# Patient Record
Sex: FEMALE | Race: WHITE | NOT HISPANIC OR LATINO | Employment: STUDENT | URBAN - METROPOLITAN AREA
[De-identification: names, ages, dates, MRNs, and addresses within clinical notes are randomized per-mention and may not be internally consistent; named-entity substitution may affect disease eponyms.]

---

## 2017-01-10 ENCOUNTER — OFFICE VISIT (OUTPATIENT)
Dept: PODIATRY | Facility: CLINIC | Age: 22
End: 2017-01-10
Payer: COMMERCIAL

## 2017-01-10 DIAGNOSIS — B07.0 PLANTAR WART: Primary | ICD-10-CM

## 2017-01-10 PROCEDURE — 99024 POSTOP FOLLOW-UP VISIT: CPT | Mod: S$GLB,,,

## 2017-01-10 PROCEDURE — 99999 PR PBB SHADOW E&M-EST. PATIENT-LVL II: CPT | Mod: PBBFAC,,,

## 2017-01-10 NOTE — MR AVS SNAPSHOT
[place] [facility]  [address]  [facility] [address]  Phone: [phone]                  Terra Hamilton   1/10/2017 10:45 AM   Office Visit    Description:  Female : 1995   Provider:  Lm Obrien DPM   Department:  [place] [facility]           Reason for Visit     Follow-up                To Do List           Goals (5 Years of Data)     None      [facility]     [facility] -  Assistance  Registered nurses in the [facility] provide clinical advisement, health education, appointment booking, and other advisory services.  Call for this free service at [phone].             Medications           Message regarding Medications     Verify the changes and/or additions to your medication regime listed below are the same as discussed with your clinician today.  If any of these changes or additions are incorrect, please notify your healthcare provider.        STOP taking these medications     ethosuximide (ZARONTIN) 250 mg/5 mL Soln One teaspoon twice daily    meloxicam (MOBIC) 15 MG tablet Take 1 tablet (15 mg total) by mouth once daily.    minocycline (MINOCIN,DYNACIN) 100 MG capsule Po twice daily           Verify that the below list of medications is an accurate representation of the medications you are currently taking.  If none reported, the list may be blank. If incorrect, please contact your healthcare provider. Carry this list with you in case of emergency.           Current Medications     CLINDAGEL 1 % gel Thin film to scalp bid    clobetasol (CLOBEX) 0.05 % shampoo Wash scalp and soak 15 min prior to rinsing, repeat 3x/week    clobetasol (TEMOVATE) 0.05 % external solution Use on scalp one - two times daily as needed    ethosuximide (ZARONTIN) 250 mg Cap     lamotrigine (LAMICTAL) 200 MG tablet One twice daily    tazarotene (TAZORAC) 0.05 % Crea cream Thin film to face qhs, take night off if irritation develops            Clinical Reference Information           Allergies as of 1/10/2017     No Known Allergies      Immunizations Administered on Date of Encounter - 1/10/2017     None

## 2017-01-14 NOTE — PROGRESS NOTES
Chief Complaint   Patient presents with    Follow-up     10 day f/u plantar wart left foot       History of present illness: Patient returns following cryoablation distal left fifth toe wart.    Review of Systems:  Vascular : negative rest pain, claudication, bruising  Musculoskeletal: negative for joint pain   Skin: Positive for wart  Neurological: negative for burning, tingling and numbness  All other negative.    Past medical history, family history, social history  and  allergies reviewed.    Constitutional:  Patient is oriented to person, place, and time. Vital signs are normal.  Appears well-developed and well-nourished.      Vascular:  Dorsalis pedis pulses are 2+ on the right side, and 2+ on the left side.   Posterior tibial pulses are 2+ on the right side, and 2+ on the left side.   + digital hair growth, capillary fill time to all toes <3 seconds  +swelling and redness  Left 5th toenail    Skin/Dermatological:  Skin is warm.  No cyanosis or clubbing. No rashes noted.  No open wounds.   Verruca left distal 5th toe  resolved.    Musculoskeletal:    Normal range of motion of bilateral metatarsal, subtalar and ankle joints, no deformity, tenderness or crepitus. No assymmetries noted. Muscle strength to tibialis anterior, extensor hallucis longus, extensor digitorum longus, peroneal muscles, flexor hallucis/digotorum longus, posterior tibial and gastrosoleal complex is 5/5.    Neurological:  No deficits to sharp/dull, light touch or vibratory sensation.   Normal strength lower extremity muscles, normal tone without assymmetry     Asseement/Plan:  #1Verruca left fifth toenail: Resolved, rtc prn

## 2017-04-04 ENCOUNTER — HOSPITAL ENCOUNTER (OUTPATIENT)
Dept: RADIOLOGY | Facility: HOSPITAL | Age: 22
Discharge: HOME | End: 2017-04-04
Payer: COMMERCIAL

## 2017-04-04 ENCOUNTER — OFFICE VISIT (OUTPATIENT)
Dept: PODIATRY | Facility: CLINIC | Age: 22
End: 2017-04-04
Payer: COMMERCIAL

## 2017-04-04 DIAGNOSIS — Q66.6 PES VALGUS: ICD-10-CM

## 2017-04-04 DIAGNOSIS — M20.10 HALLUX ABDUCTO VALGUS, UNSPECIFIED LATERALITY: ICD-10-CM

## 2017-04-04 DIAGNOSIS — B07.0 PLANTAR WART: Primary | ICD-10-CM

## 2017-04-04 PROCEDURE — 99213 OFFICE O/P EST LOW 20 MIN: CPT | Mod: 25,S$GLB,,

## 2017-04-04 PROCEDURE — 73650 X-RAY EXAM OF HEEL: CPT | Mod: 26,50,, | Performed by: RADIOLOGY

## 2017-04-04 PROCEDURE — 17110 DESTRUCTION B9 LES UP TO 14: CPT | Mod: S$GLB,,,

## 2017-04-04 PROCEDURE — 99999 PR PBB SHADOW E&M-EST. PATIENT-LVL III: CPT | Mod: PBBFAC,,,

## 2017-04-04 PROCEDURE — 1160F RVW MEDS BY RX/DR IN RCRD: CPT | Mod: S$GLB,,,

## 2017-04-04 PROCEDURE — 73650 X-RAY EXAM OF HEEL: CPT | Mod: 50,TC,PO

## 2017-04-04 PROCEDURE — 73630 X-RAY EXAM OF FOOT: CPT | Mod: 50,TC,PO

## 2017-04-04 PROCEDURE — 73630 X-RAY EXAM OF FOOT: CPT | Mod: 26,50,, | Performed by: RADIOLOGY

## 2017-04-04 NOTE — PROGRESS NOTES
Subjective:       Patient ID: Terra Hamilton is a 21 y.o. female.    Chief Complaint: Plantar Warts    HPI  Patient presents to the clinic c/o lesions left third and fifth toes, previous left distal fifth toe wart.  She also relates arch pain, flat feet.  She has tried some OTC arch supports with mild relief.    Review of Systems  ROS:  Constitution: Negative for chills, fever, weakness and malaise/fatigue.   HEENT: Negative for headaches.   Cardiovascular: Negative for chest pain and claudication.   Respiratory: Negative for cough and shortness of breath.   Musculoskeletal: Positive for foot pain.  Negative for muscle cramps and muscle weakness.   Gastrointestinal: Negative for nausea and vomiting.   Neurological: Negative for numbness and paresthesias.   Dermatological: Negative for wound.        Objective:      Physical Exam  Constitutional:  Patient is oriented to person, place, and time. Vital signs are normal.  Appears well-developed and well-nourished.     Vascular:  Dorsalis pedis pulses are 2+ on the right side, and 2+ on the left side.   Posterior tibial pulses are 2+ on the right side, and 2+ on the left side.   + digital hair growth, capillary fill time to all toes <3 seconds, no swelling    Skin/Dermatological:  Skin is warm and intact.  No cyanosis or clubbing.  No rashes noted.  No open wounds.  Verrucae left distal 5th toe and lateral mid 3rd toe.    Musculoskeletal:      B/L HAV and collapsing flexible arches, tenderness b/l arches, + too many toe sign       Neurological:  No deficits to sharp/dull, light touch or vibratory sensation.   Muscle strength to tibialis anterior, extensor hallucis longus, extensor digitorum longus, peroneal muscles, flexor hallucis/digotorum longus, posterior tibial and gastrosoleal complex is 5/5, normal tone without assymmetry   Patellar reflexes are 2+ on the right side and 2+ on the left side.  Achilles reflexes are 2+ on the right side and 2+ on the left  side.    X-ray weightbearing bilateral feet dated today:pending      Assessment:       1. Plantar wart    2. Pes valgus    3. Hallux abducto valgus, unspecified laterality        Plan:       Plantar wart    Pes valgus  -     X-Ray Foot Complete Bilateral; Future; Expected date: 4/4/17  -     X-Ray Calcaneus Bilateral; Future; Expected date: 4/4/17    Hallux abducto valgus, unspecified laterality  -     X-Ray Foot Complete Bilateral; Future; Expected date: 4/4/17  -     X-Ray Calcaneus Bilateral; Future; Expected date: 4/4/17        Cryoablation and DSD x 2 warts.    Custom orthotics, consider arthroeresis flat foot surgery if no relief.  Vionic sandals/shoes, spenco arch supports and new balance tennis shoes.    RTC 10 days.  Will review x-ray at that time/

## 2017-04-04 NOTE — MR AVS SNAPSHOT
[facility]  [address]  [place] [address]  Phone: [phone]                  Terra Hamilton   2017 2:15 PM   Office Visit    Description:  Female : 1995   Provider:  Lm Obrien DPM   Department:  [facility]           Reason for Visit     Plantar Warts           Diagnoses this Visit        Comments    Plantar wart    -  Primary     Pes valgus         Hallux abducto valgus, unspecified laterality                To Do List           Future Appointments        Provider Department Dept Phone    2017 10:15 AM Michaela Caldwell MD [facility] [phone]    2017 11:00 AM Lm Obrien DPM [facility] [phone]      Goals (5 Years of Data)     None      [facility]     [facility] Call Nurse Care Line -  Assistance  Unless otherwise directed by your provider, please contact [facility], our nurse care line that is available for  assistance.     Registered nurses in the [facility] provide: appointment scheduling, clinical advisement, health education, and other advisory services.  Call: [phone] (toll free)               Medications           Message regarding Medications     Verify the changes and/or additions to your medication regime listed below are the same as discussed with your clinician today.  If any of these changes or additions are incorrect, please notify your healthcare provider.             Verify that the below list of medications is an accurate representation of the medications you are currently taking.  If none reported, the list may be blank. If incorrect, please contact your healthcare provider. Carry this list with you in case of emergency.           Current Medications     CLINDAGEL 1 % gel Thin film to scalp bid    clobetasol (CLOBEX) 0.05 % shampoo Wash scalp and soak 15 min prior to rinsing, repeat 3x/week    clobetasol (TEMOVATE) 0.05 % external solution Use  on scalp one - two times daily as needed    ethosuximide (ZARONTIN) 250 mg Cap     lamotrigine (LAMICTAL) 200 MG tablet One twice daily    tazarotene (TAZORAC) 0.05 % Crea cream Thin film to face qhs, take night off if irritation develops           Clinical Reference Information           Allergies as of 4/4/2017     No Known Allergies      Immunizations Administered on Date of Encounter - 4/4/2017     None      Orders Placed During Today's Visit     Future Labs/Procedures Expected by Expires    X-Ray Calcaneus Bilateral  4/4/2017 4/4/2018    X-Ray Foot Complete Bilateral  4/4/2017 4/4/2018      Language Assistance Services     ATTENTION: Language assistance services are available, free of charge. Please call [phone].      ATENCIÓN: Si ervinla nir, tiene a valdez disposición servicios gratuitos de asistencia lingüística. Llame al [phone].     CHÚ Ý: N?u b?n nói Ti?ng Vi?t, có các d?ch v? h? tr? ngôn ng? mi?n phí dành cho b?n. G?i s? [phone].         [place] [facility] complies with applicable Federal civil rights laws and does not discriminate on the basis of race, color, national origin, age, disability, or sex.

## 2017-04-10 ENCOUNTER — TELEPHONE (OUTPATIENT)
Dept: DERMATOLOGY | Facility: CLINIC | Age: 22
End: 2017-04-10

## 2017-04-10 RX ORDER — ONDANSETRON 4 MG/1
TABLET, ORALLY DISINTEGRATING ORAL
COMMUNITY
Start: 2017-02-16 | End: 2017-04-20 | Stop reason: ALTCHOICE

## 2017-04-10 NOTE — TELEPHONE ENCOUNTER
----- Message from Nery Dior sent at 4/10/2017 11:17 AM CDT -----  Contact: Mom [phone]  Please call her about rescheduling an appt for Terra.  She is only home this week and this appt needs to coordinate with Dr Hickey's appt.  Thank you!   Pt contacted & rescheduled.

## 2017-04-11 ENCOUNTER — OFFICE VISIT (OUTPATIENT)
Dept: PODIATRY | Facility: CLINIC | Age: 22
End: 2017-04-11
Payer: COMMERCIAL

## 2017-04-11 DIAGNOSIS — M20.10 HALLUX ABDUCTO VALGUS, UNSPECIFIED LATERALITY: ICD-10-CM

## 2017-04-11 DIAGNOSIS — Q66.6 PES VALGUS: ICD-10-CM

## 2017-04-11 DIAGNOSIS — B07.0 PLANTAR WART: Primary | ICD-10-CM

## 2017-04-11 PROCEDURE — 99024 POSTOP FOLLOW-UP VISIT: CPT | Mod: S$GLB,,,

## 2017-04-11 PROCEDURE — 99999 PR PBB SHADOW E&M-EST. PATIENT-LVL II: CPT | Mod: PBBFAC,,,

## 2017-04-11 PROCEDURE — 1160F RVW MEDS BY RX/DR IN RCRD: CPT | Mod: S$GLB,,,

## 2017-04-11 PROCEDURE — 17110 DESTRUCTION B9 LES UP TO 14: CPT | Mod: 58,S$GLB,,

## 2017-04-11 NOTE — PROGRESS NOTES
Subjective:       Patient ID: Terra Hamilton is a 21 y.o. female.    Chief Complaint: Follow-up (10 day f/u wart Lt foot )    HPI  Patient presents to the clinic c/o lesions left third and fifth toes, previous left distal fifth toe wart.  She also relates arch pain, flat feet.  She has tried some OTC arch supports with mild relief.    Review of Systems  ROS:  Constitution: Negative for chills, fever, weakness and malaise/fatigue.   HEENT: Negative for headaches.   Cardiovascular: Negative for chest pain and claudication.   Respiratory: Negative for cough and shortness of breath.   Musculoskeletal: Positive for foot pain.  Negative for muscle cramps and muscle weakness.   Gastrointestinal: Negative for nausea and vomiting.   Neurological: Negative for numbness and paresthesias.   Dermatological: Negative for wound.        Objective:      Physical Exam  Constitutional:  Patient is oriented to person, place, and time. Vital signs are normal.  Appears well-developed and well-nourished.     Vascular:  Dorsalis pedis pulses are 2+ on the right side, and 2+ on the left side.   Posterior tibial pulses are 2+ on the right side, and 2+ on the left side.   + digital hair growth, capillary fill time to all toes <3 seconds, no swelling    Skin/Dermatological:  Skin is warm and intact.  No cyanosis or clubbing.  No rashes noted.  No open wounds.  Verrucae left distal 5th toe and lateral mid 3rd toe 50 % resolved    Musculoskeletal:      B/L HAV and collapsing flexible arches, tenderness b/l arches, + too many toe sign       Neurological:  No deficits to sharp/dull, light touch or vibratory sensation.   Muscle strength to tibialis anterior, extensor hallucis longus, extensor digitorum longus, peroneal muscles, flexor hallucis/digotorum longus, posterior tibial and gastrosoleal complex is 5/5, normal tone without assymmetry   Patellar reflexes are 2+ on the right side and 2+ on the left side.  Achilles reflexes are 2+ on  the right side and 2+ on the left side.        Assessment:       1. Plantar wart    2. Pes valgus    3. Hallux abducto valgus, unspecified laterality        Plan:       Plantar wart    Pes valgus    Hallux abducto valgus, unspecified laterality        Cryoablation of the two warts with liquid nitrogen and DSD x 2     Custom orthotics to be received today consider arthroeresis flat foot surgery and bunionectomy if no relief.  Vionic sandals/shoes, spenco arch supports and new balance tennis shoes.  Trial of six weeks of orthotics.  The risks, benefits, complications, treatment options, and expected outcomes were discussed with the patient who verbalized understanding.   Consider sx in May if needed.    RTC 10 days.

## 2017-04-20 ENCOUNTER — OFFICE VISIT (OUTPATIENT)
Dept: DERMATOLOGY | Facility: CLINIC | Age: 22
End: 2017-04-20
Payer: COMMERCIAL

## 2017-04-20 ENCOUNTER — TELEPHONE (OUTPATIENT)
Dept: DERMATOLOGY | Facility: CLINIC | Age: 22
End: 2017-04-20

## 2017-04-20 ENCOUNTER — LAB VISIT (OUTPATIENT)
Dept: LAB | Facility: HOSPITAL | Age: 22
End: 2017-04-20
Attending: DERMATOLOGY
Payer: COMMERCIAL

## 2017-04-20 VITALS — WEIGHT: 218 LBS | BODY MASS INDEX: 41.16 KG/M2 | HEIGHT: 61 IN

## 2017-04-20 DIAGNOSIS — L73.0 ACNE KELOIDALIS NUCHAE: ICD-10-CM

## 2017-04-20 DIAGNOSIS — L73.9 FOLLICULITIS: ICD-10-CM

## 2017-04-20 DIAGNOSIS — L70.0 ACNE VULGARIS: Primary | ICD-10-CM

## 2017-04-20 DIAGNOSIS — Z79.899 ENCOUNTER FOR LONG-TERM (CURRENT) USE OF HIGH-RISK MEDICATION: ICD-10-CM

## 2017-04-20 DIAGNOSIS — B07.9 VIRAL WARTS, UNSPECIFIED TYPE: ICD-10-CM

## 2017-04-20 LAB — B-HCG UR QL: NEGATIVE

## 2017-04-20 PROCEDURE — 81025 URINE PREGNANCY TEST: CPT | Mod: PO

## 2017-04-20 PROCEDURE — 11900 INJECT SKIN LESIONS </W 7: CPT | Mod: S$GLB,,, | Performed by: DERMATOLOGY

## 2017-04-20 PROCEDURE — 99214 OFFICE O/P EST MOD 30 MIN: CPT | Mod: 25,S$GLB,, | Performed by: DERMATOLOGY

## 2017-04-20 PROCEDURE — 1160F RVW MEDS BY RX/DR IN RCRD: CPT | Mod: S$GLB,,, | Performed by: DERMATOLOGY

## 2017-04-20 PROCEDURE — 99999 PR PBB SHADOW E&M-EST. PATIENT-LVL II: CPT | Mod: PBBFAC,,, | Performed by: DERMATOLOGY

## 2017-04-20 RX ORDER — NORGESTIMATE AND ETHINYL ESTRADIOL 7DAYSX3 LO
1 KIT ORAL DAILY
Qty: 30 TABLET | Refills: 5 | Status: SHIPPED | OUTPATIENT
Start: 2017-04-20 | End: 2017-12-14

## 2017-04-20 RX ORDER — IMIQUIMOD 12.5 MG/.25G
CREAM TOPICAL
Qty: 24 PACKET | Refills: 1 | Status: SHIPPED | OUTPATIENT
Start: 2017-04-20 | End: 2017-05-22 | Stop reason: ALTCHOICE

## 2017-04-20 NOTE — PROGRESS NOTES
Subjective:       Patient ID:  Terra Hamilton is a 21 y.o. female who presents for No chief complaint on file.    HPI Comments: Last seen December 19, 2016- here for f/u, numerous complaints    Warts- left foot and arm. Has been treated by podiatry. Verruca on left 5th toe treatment resistant. Desires candida injection if possible. History donut wart s/p cryo in past. Using compounded wart cream to left arm. Warts have gotten crusty, doesn't seem to work well.     Acne vulgaris- face and mild upper back. Stable. Flares with menses. Using tazorac and onexton. No improvement. Sister tolerated isotretinoin. Considering.     Folliculitis AKN posterior scalp, improved s/p ILK       No medicated face wash.     Review of Systems   Skin: Positive for daily sunscreen use and activity-related sunscreen use. Negative for itching, rash, dry skin and wears hat.   Psychiatric/Behavioral: Negative for depressed mood, anxiety and high stress.        Objective:    Physical Exam   Constitutional: She appears well-developed and well-nourished. She is obese.  No distress.   HENT:   Mouth/Throat: Lips normal.    Eyes: Lids are normal.  No conjunctival no injection.   Cardiovascular: There is no local extremity swelling and no dependent edema.     Neurological: She is alert and oriented to person, place, and time. She is not disoriented.   Psychiatric: She has a normal mood and affect.   Skin:   Areas Examined (abnormalities noted in diagram):   Scalp / Hair Palpated and Inspected  Head / Face Inspection Performed  Neck Inspection Performed  Chest / Axilla Inspection Performed  Abdomen Inspection Performed  Back Inspection Performed  RUE Inspected  LUE Inspection Performed  RLE Inspected  LLE Inspection Performed  Nails and Digits Inspection Performed                       Diagram Legend     Erythematous scaling macule/papule c/w actinic keratosis       Vascular papule c/w angioma      Pigmented verrucoid papule/plaque c/w  seborrheic keratosis      Yellow umbilicated papule c/w sebaceous hyperplasia      Irregularly shaped tan macule c/w lentigo     1-2 mm smooth white papules consistent with Milia      Movable subcutaneous cyst with punctum c/w epidermal inclusion cyst      Subcutaneous movable cyst c/w pilar cyst      Firm pink to brown papule c/w dermatofibroma      Pedunculated fleshy papule(s) c/w skin tag(s)      Evenly pigmented macule c/w junctional nevus     Mildly variegated pigmented, slightly irregular-bordered macule c/w mildly atypical nevus      Flesh colored to evenly pigmented papule c/w intradermal nevus       Pink pearly papule/plaque c/w basal cell carcinoma      Erythematous hyperkeratotic cursted plaque c/w SCC      Surgical scar with no sign of skin cancer recurrence      Open and closed comedones      Inflammatory papules and pustules      Verrucoid papule consistent consistent with wart     Erythematous eczematous patches and plaques     Dystrophic onycholytic nail with subungual debris c/w onychomycosis     Umbilicated papule    Erythematous-base heme-crusted tan verrucoid plaque consistent with inflamed seborrheic keratosis     Erythematous Silvery Scaling Plaque c/w Psoriasis     See annotation      Assessment / Plan:        Acne vulgaris  Failed conservative therapies- retinoids/ BP/clinda/doxy  Plan to start isotretinoin at f/u pending labs    Discussed risks and benefits of Isotretinoin including but not limited to dry eyes, dry skin, and dry lips; headaches; nosebleeds; muscle aches; joint aches; elevated liver functions; elevated cholesterol or triglycerides; depression; diarrhea/stomach cramping (inflammatory bowel disease); increased sun sensitivity; birth defects. No laser or chemical peels while on Isotretinoin or for six months after completion of Isotretinoin course. No waxing and no donating blood while on Isotretinoin or for one month after completion of Isotretinoin course.    Patient to see  GYN for OCP and call for fasting blood work after on OCP x 3 weeks - will start Isotretinoin at first menstrual cycle after starting OCP. Patient counseled extensively regarding need for two forms of birth control while on Isotretinoin and for 1 month prior to and 1 month following treatment course.     Patient commits to abstinence from heterosexual contact for a minimum of 1 month prior, during, and 1 month following Isotretinoin therapy.     Will get consents signed and enter patient into Ipledge program.  Will check baseline lipid panel, liver function tests and pregnancy test.   If labs normal, will start Isotretinoin at 40 mg po daily.           Acne keloidalis nuchae, scalp  Improved s/p ILK  Plan to start isotretinoin at f/u likely to help as well.     Folliculitis, scalp  Plan to start isotretinoin in future     Viral warts, unspecified type  Failed compounded wart cream  Donut warts with cryo  Plan to start aldara  Procedure note for Candida Antigen injection:    Discussed risks of procedure including local redness, swelling, and lymph node enlargement. Verbal consent obtained. Area cleaned with alcohol. 0.3cc of Candida antigen injected intradermally at the base of the verruca. Patient tolerated well.   This was patient's 1st cycle of Candida Antigen- injected into both warts on left foot     -     imiquimod (ALDARA) 5 % cream; Apply small amount to affected area left arm and warts on foot 5 nights/week x 4 - 6 weeks. Stop if severe irritation develops  Dispense: 24 packet; Refill: 1    Encounter for long-term (current) use of high-risk medication  -     CBC auto differential; Future  -     Comprehensive metabolic panel; Future  -     Lipid panel; Future  -     Pregnancy, urine rapid; Future  -     Pregnancy, urine rapid; Future             Return in about 4 weeks (around 5/18/2017).

## 2017-04-20 NOTE — TELEPHONE ENCOUNTER
Discussed results with patient and reminded her about birth control and to start now.  Reminded of labs in two weeks as well as next UPT on next visit 5/22/17.  Verbalized understanding

## 2017-04-20 NOTE — TELEPHONE ENCOUNTER
----- Message from Michaela Caldwell MD sent at 4/20/2017  3:55 PM CDT -----  Please call pt and inform her UPT was negative. Also inform her that  I recommend if she plans to start accutane that she start the OCP as well. I sent this to her pharmacy. We discussed this in clinic but I did not say I was going to prescribe it today. Ok for her to go ahead and start as the OCP can help with acne as well. Discuss that there is a rare risk of blood clots with birth control pills. As long as pt does not have a personal or family history of clots and does not smoke this risk is minimal.

## 2017-04-29 ENCOUNTER — LAB VISIT (OUTPATIENT)
Dept: LAB | Facility: HOSPITAL | Age: 22
End: 2017-04-29
Attending: DERMATOLOGY
Payer: COMMERCIAL

## 2017-04-29 DIAGNOSIS — Z79.899 ENCOUNTER FOR LONG-TERM (CURRENT) USE OF HIGH-RISK MEDICATION: ICD-10-CM

## 2017-04-29 LAB
ALBUMIN SERPL BCP-MCNC: 3.8 G/DL
ALP SERPL-CCNC: 50 U/L
ALT SERPL W/O P-5'-P-CCNC: 13 U/L
ANION GAP SERPL CALC-SCNC: 7 MMOL/L
AST SERPL-CCNC: 18 U/L
BASOPHILS # BLD AUTO: 0.05 K/UL
BASOPHILS NFR BLD: 0.7 %
BILIRUB SERPL-MCNC: 0.3 MG/DL
BUN SERPL-MCNC: 11 MG/DL
CALCIUM SERPL-MCNC: 9.6 MG/DL
CHLORIDE SERPL-SCNC: 108 MMOL/L
CHOLEST/HDLC SERPL: 4.1 {RATIO}
CO2 SERPL-SCNC: 25 MMOL/L
CREAT SERPL-MCNC: 0.9 MG/DL
DIFFERENTIAL METHOD: ABNORMAL
EOSINOPHIL # BLD AUTO: 0.1 K/UL
EOSINOPHIL NFR BLD: 1.2 %
ERYTHROCYTE [DISTWIDTH] IN BLOOD BY AUTOMATED COUNT: 12.9 %
EST. GFR  (AFRICAN AMERICAN): >60 ML/MIN/1.73 M^2
EST. GFR  (NON AFRICAN AMERICAN): >60 ML/MIN/1.73 M^2
GLUCOSE SERPL-MCNC: 81 MG/DL
HCT VFR BLD AUTO: 41.2 %
HDL/CHOLESTEROL RATIO: 24.1 %
HDLC SERPL-MCNC: 199 MG/DL
HDLC SERPL-MCNC: 48 MG/DL
HGB BLD-MCNC: 13.9 G/DL
LDLC SERPL CALC-MCNC: 132.8 MG/DL
LYMPHOCYTES # BLD AUTO: 2.8 K/UL
LYMPHOCYTES NFR BLD: 38 %
MCH RBC QN AUTO: 31.4 PG
MCHC RBC AUTO-ENTMCNC: 33.7 %
MCV RBC AUTO: 93 FL
MONOCYTES # BLD AUTO: 0.4 K/UL
MONOCYTES NFR BLD: 4.9 %
NEUTROPHILS # BLD AUTO: 4 K/UL
NEUTROPHILS NFR BLD: 55.2 %
NONHDLC SERPL-MCNC: 151 MG/DL
PLATELET # BLD AUTO: 271 K/UL
PMV BLD AUTO: 10.7 FL
POTASSIUM SERPL-SCNC: 4.7 MMOL/L
PROT SERPL-MCNC: 7 G/DL
RBC # BLD AUTO: 4.42 M/UL
SODIUM SERPL-SCNC: 140 MMOL/L
TRIGL SERPL-MCNC: 91 MG/DL
WBC # BLD AUTO: 7.28 K/UL

## 2017-04-29 PROCEDURE — 80061 LIPID PANEL: CPT

## 2017-04-29 PROCEDURE — 36415 COLL VENOUS BLD VENIPUNCTURE: CPT | Mod: PO

## 2017-04-29 PROCEDURE — 85025 COMPLETE CBC W/AUTO DIFF WBC: CPT

## 2017-04-29 PROCEDURE — 80053 COMPREHEN METABOLIC PANEL: CPT

## 2017-05-03 ENCOUNTER — PATIENT OUTREACH (OUTPATIENT)
Dept: ADMINISTRATIVE | Facility: HOSPITAL | Age: 22
End: 2017-05-03

## 2017-05-03 NOTE — LETTER
May 10, 2017    Terra Hamilton  [address]  [place] [address]             [facility] [address]  [facility] [address]  Phone: [phone] Dear Ms. Hamilton:    We have tried to reach you by mychart unsuccessfully.    [facility] is committed to your overall health.  To help you get the most out of each of your visits, we will review your information to make sure you are up to date on all of your recommended tests and/or procedures.       Dr. Everett Nava has found that you may be due for an HPV immunization.     If you have had any of the above done at another facility, please bring the records or information with you so that your record at [facility] will be complete.  If you would like to schedule any of these, please contact me.     If you are currently taking medication, please bring it with you to your appointment for review.     If you have any questions or concerns, please don't hesitate to call.    Thank you for letting us care for you,  Belle Bergeron LPN Clinical Care Coordinator  [facility] [place]  [phone]

## 2017-05-12 ENCOUNTER — PATIENT MESSAGE (OUTPATIENT)
Dept: PODIATRY | Facility: CLINIC | Age: 22
End: 2017-05-12

## 2017-05-18 ENCOUNTER — OFFICE VISIT (OUTPATIENT)
Dept: FAMILY MEDICINE | Facility: CLINIC | Age: 22
End: 2017-05-18
Payer: COMMERCIAL

## 2017-05-18 VITALS
HEIGHT: 61 IN | SYSTOLIC BLOOD PRESSURE: 112 MMHG | DIASTOLIC BLOOD PRESSURE: 76 MMHG | RESPIRATION RATE: 16 BRPM | WEIGHT: 224.44 LBS | BODY MASS INDEX: 42.37 KG/M2 | HEART RATE: 83 BPM

## 2017-05-18 DIAGNOSIS — G40.802 OTHER EPILEPSY WITHOUT STATUS EPILEPTICUS, NOT INTRACTABLE: ICD-10-CM

## 2017-05-18 DIAGNOSIS — Z01.818 PREOP EXAMINATION: ICD-10-CM

## 2017-05-18 DIAGNOSIS — Z00.00 PREVENTATIVE HEALTH CARE: Primary | ICD-10-CM

## 2017-05-18 PROCEDURE — 99999 PR PBB SHADOW E&M-EST. PATIENT-LVL III: CPT | Mod: PBBFAC,,, | Performed by: FAMILY MEDICINE

## 2017-05-18 PROCEDURE — 99395 PREV VISIT EST AGE 18-39: CPT | Mod: S$GLB,,, | Performed by: FAMILY MEDICINE

## 2017-05-18 NOTE — PROGRESS NOTES
Subjective:     THIS DOCUMENT WAS MADE IN PART WITH [employer] SOFTWARE.  OCCASIONALLY THIS SOFTWARE WILL MISINTERPRET WORDS OR PHRASES.     Patient ID: Terra Hamilton is a 21 y.o. female.    Chief Complaint: Pre-op Exam (left foot Dr Miguel )    HPI    General checkup, wellness, and pre-op for anticipated foot surgery in June. She has generally been well without any significant health changes. She does have refractory acne and may start isotretinoin. She has a history of seizure disorder. This has been controlled with no recent seizures.    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/04/2017     Resolved Ambulatory Problems     Diagnosis Date Noted    No Resolved Ambulatory Problems     Past Medical History:   Diagnosis Date    Focal seizures     History of polycystic ovarian disease     Speech delay      Current Outpatient Prescriptions on File Prior to Visit   Medication Sig Dispense Refill    CLINDAGEL 1 % gel Thin film to scalp bid 1 Bottle 3    clobetasol (CLOBEX) 0.05 % shampoo Wash scalp and soak 15 min prior to rinsing, repeat 3x/week 118 mL 6    clobetasol (TEMOVATE) 0.05 % external solution Use on scalp one - two times daily as needed 60 mL 3    lamotrigine (LAMICTAL) 200 MG tablet One twice daily 60 tablet 0    norgestimate-ethinyl estradiol (ORTHO TRI-CYCLEN LO) 0.18/0.215/0.25 mg-25 mcg tablet Take 1 tablet by mouth once daily. 30 tablet 5    tazarotene (TAZORAC) 0.05 % Crea cream Thin film to face qhs, take night off if irritation develops 60 g 3    ethosuximide (ZARONTIN) 250 mg Cap        No current facility-administered medications on file prior to visit.      Review of patient's allergies indicates:  No Known  "Allergies  Social History   Substance Use Topics    Smoking status: Never Smoker    Smokeless tobacco: Never Used    Alcohol use Yes      Comment: only on special occasions     Family History   Problem Relation Age of Onset    Hypertension Mother     Heart disease Mother     Arthritis Mother     Cholelithiasis Mother     Hypertension Father     Asthma Sister     Endometriosis Sister     Anemia Sister     Hypertension Maternal Grandmother     Heart disease Maternal Grandfather     Breast cancer Paternal Grandmother     Myasthenia gravis Paternal Grandmother     Myasthenia gravis Paternal Grandfather     Diabetes Paternal Grandfather     Irritable bowel syndrome Maternal Aunt     Ulcerative colitis Maternal Uncle            Review of Systems   Constitutional: Negative for fatigue, fever and unexpected weight change.   HENT: Negative for sinus pressure and trouble swallowing.    Eyes: Negative for pain and visual disturbance.   Respiratory: Negative for cough, chest tightness and shortness of breath.    Cardiovascular: Negative for chest pain, palpitations and leg swelling.   Gastrointestinal: Negative for abdominal pain, blood in stool, constipation, diarrhea, nausea and vomiting.   Genitourinary: Negative for dysuria, frequency and hematuria.   Skin: Negative for rash and wound.   Neurological: Negative for dizziness, tremors, syncope, numbness and headaches.   Psychiatric/Behavioral: Negative for dysphoric mood and sleep disturbance. The patient is not nervous/anxious.        Objective:       Vitals:    05/18/17 1142   BP: 112/76   Pulse: 83   Resp: 16   Weight: 101.8 kg (224 lb 6.9 oz)   Height: 5' 1" (1.549 m)       Physical Exam   Constitutional: She is oriented to person, place, and time. She appears well-developed and well-nourished.   HENT:   Head: Normocephalic and atraumatic.   Right Ear: Tympanic membrane, external ear and ear canal normal.   Left Ear: Tympanic membrane, external ear and " ear canal normal.   Nose: Nose normal.   Eyes: Conjunctivae are normal. Pupils are equal, round, and reactive to light. Right eye exhibits no discharge. Left eye exhibits no discharge. No scleral icterus.   Neck: Neck supple. No JVD present. No thyromegaly present.   Cardiovascular: Normal rate, regular rhythm and normal heart sounds.    No murmur heard.  Pulmonary/Chest: Effort normal and breath sounds normal. No respiratory distress. She has no rales.   Abdominal: Soft. Bowel sounds are normal. She exhibits no distension. There is no tenderness.   Lymphadenopathy:     She has no cervical adenopathy.   Neurological: She is alert and oriented to person, place, and time.   Skin: Skin is dry. She is not diaphoretic.   Psychiatric: She has a normal mood and affect. Her behavior is normal.   Vitals reviewed.      Assessment:       1. Preventative health care    2. Preop examination    3. Other epilepsy without status epilepticus, not intractable        Plan:       Terra was seen today for pre-op exam.    Diagnoses and all orders for this visit:    Preventative health care   Discussed healthy diet, age-appropriate preventative healthcare.  Preop examination   She has no contraindications for surgery and may proceed.  Other epilepsy without status epilepticus, not intractable   This is well-controlled with no recent seizures.           Ok for surgery

## 2017-05-22 ENCOUNTER — OFFICE VISIT (OUTPATIENT)
Dept: DERMATOLOGY | Facility: CLINIC | Age: 22
End: 2017-05-22
Payer: COMMERCIAL

## 2017-05-22 ENCOUNTER — LAB VISIT (OUTPATIENT)
Dept: LAB | Facility: HOSPITAL | Age: 22
End: 2017-05-22
Attending: DERMATOLOGY
Payer: COMMERCIAL

## 2017-05-22 VITALS — WEIGHT: 224 LBS | BODY MASS INDEX: 42.29 KG/M2 | HEIGHT: 61 IN

## 2017-05-22 DIAGNOSIS — Z79.899 ENCOUNTER FOR LONG-TERM (CURRENT) USE OF HIGH-RISK MEDICATION: ICD-10-CM

## 2017-05-22 DIAGNOSIS — L70.0 ACNE VULGARIS: ICD-10-CM

## 2017-05-22 DIAGNOSIS — B07.9 VIRAL WARTS, UNSPECIFIED TYPE: ICD-10-CM

## 2017-05-22 DIAGNOSIS — B07.8 VERRUCA PLANA: Primary | ICD-10-CM

## 2017-05-22 LAB — B-HCG UR QL: NEGATIVE

## 2017-05-22 PROCEDURE — 81025 URINE PREGNANCY TEST: CPT | Mod: PO

## 2017-05-22 PROCEDURE — 11900 INJECT SKIN LESIONS </W 7: CPT | Mod: S$GLB,,, | Performed by: DERMATOLOGY

## 2017-05-22 PROCEDURE — 99213 OFFICE O/P EST LOW 20 MIN: CPT | Mod: 25,S$GLB,, | Performed by: DERMATOLOGY

## 2017-05-22 PROCEDURE — 1160F RVW MEDS BY RX/DR IN RCRD: CPT | Mod: S$GLB,,, | Performed by: DERMATOLOGY

## 2017-05-22 PROCEDURE — 99999 PR PBB SHADOW E&M-EST. PATIENT-LVL II: CPT | Mod: PBBFAC,,, | Performed by: DERMATOLOGY

## 2017-05-22 NOTE — PROGRESS NOTES
Subjective:       Patient ID:  Terra Hamilton is a 21 y.o. female who presents for   Chief Complaint   Patient presents with    Acne    Follow-up     Pt here for follow up Acne.- face, stable. Mild improvement since starting OCP in prep to start isotretinoin. Negative UPT today  Follow up viral warts on left foot treated with Candida Antigen 0.03% injected into both warts on left foot - have resoved. States has bunion surgery with Dr. Obrien in near future.   Warts on left arm treated with  Aldara 5% cream - have not resolved. Single lesion in this area occasionally bleeds and gets inflamed. No other treatment aside from aldara  Acne vulgaris- face and mild upper back using Patrick acid wash &Tazorac 0.05% cream. Started on ocp last month- still having breakouts   Folliculitis AKN posterior scalp, improved s/p ILK    Acne keloidalis nuchae, scalp improved s/p ILK    Acne vulgaris  Failed conservative therapies- retinoids/ BP/clinda/doxy  Patient commits to abstinence from heterosexual contact for a minimum of 1 month prior, during, and 1 month following Isotretinoin therapy.   Negative UPT today                                           Review of Systems   Skin: Positive for activity-related sunscreen use. Negative for rash, daily sunscreen use, sensitivity to bandage adhesive and recent sunburn.        Objective:    Physical Exam   Constitutional: She appears well-developed and well-nourished. No distress.   HENT:   Mouth/Throat: Lips normal.    Eyes: Lids are normal.  No conjunctival no injection.   Cardiovascular: There is no local extremity swelling and no dependent edema.     Neurological: She is alert and oriented to person, place, and time. She is not disoriented.   Psychiatric: She has a normal mood and affect.   Skin:   Areas Examined (abnormalities noted in diagram):   Head / Face Inspection Performed  Neck Inspection Performed  Chest / Axilla Inspection Performed  RUE Inspected  LUE Inspection  Performed                   Diagram Legend     Erythematous scaling macule/papule c/w actinic keratosis       Vascular papule c/w angioma      Pigmented verrucoid papule/plaque c/w seborrheic keratosis      Yellow umbilicated papule c/w sebaceous hyperplasia      Irregularly shaped tan macule c/w lentigo     1-2 mm smooth white papules consistent with Milia      Movable subcutaneous cyst with punctum c/w epidermal inclusion cyst      Subcutaneous movable cyst c/w pilar cyst      Firm pink to brown papule c/w dermatofibroma      Pedunculated fleshy papule(s) c/w skin tag(s)      Evenly pigmented macule c/w junctional nevus     Mildly variegated pigmented, slightly irregular-bordered macule c/w mildly atypical nevus      Flesh colored to evenly pigmented papule c/w intradermal nevus       Pink pearly papule/plaque c/w basal cell carcinoma      Erythematous hyperkeratotic cursted plaque c/w SCC      Surgical scar with no sign of skin cancer recurrence      Open and closed comedones      Inflammatory papules and pustules      Verrucoid papule consistent consistent with wart     Erythematous eczematous patches and plaques     Dystrophic onycholytic nail with subungual debris c/w onychomycosis     Umbilicated papule    Erythematous-base heme-crusted tan verrucoid plaque consistent with inflamed seborrheic keratosis     Erythematous Silvery Scaling Plaque c/w Psoriasis     See annotation      Assessment / Plan:        Verruca plana  -     candida albicans skin test skin test 0.1 mL; Inject 0.1 mLs into the skin one time.  Procedure note for Candida Antigen injection:    Discussed risks of procedure including local redness, swelling, and lymph node enlargement. Verbal consent obtained. Area cleaned with alcohol. 0.3cc of Candida antigen injected intradermally at the base of the verruca. Patient tolerated well.   This was patient's 2nd cycle of Candida Antigen.         Viral warts, unspecified type  Cryo to large lesion left  arm  Cryosurgery procedure note:    Verbal consent from the patient is obtained. Liquid nitrogen cryosurgery is applied to 1 verruca , as detailed in the physical exam, to produce a freeze injury. 2 consecutive freeze thaw cycles are applied to each verruca. The patient is aware that blisters (possibly blood blisters) may form.    Discussed risk scarring and hypopigmentation and possible need for repeat treatments    -     candida albicans skin test skin test 0.1 mL; Inject 0.1 mLs into the skin one time.    Acne vulgaris  Still with persistent disease despite OCP  Failed more conservative therapies- topicals, oral abx- plan was to start isotretinoin today  In light of upcoming foot surgery will get clearance from Dr. Obrien    Discussed risks and benefits of Isotretinoin including but not limited to dry eyes, dry skin, and dry lips; headaches; nosebleeds; muscle aches; joint aches; elevated liver functions; elevated cholesterol or triglycerides; depression; diarrhea/stomach cramping (inflammatory bowel disease); increased sun sensitivity; birth defects. No laser or chemical peels while on Isotretinoin or for six months after completion of Isotretinoin course. No waxing and no donating blood while on Isotretinoin or for one month after completion of Isotretinoin course.    If ok with Dr. Obrien will start isotretinoin at 40mg daily                   Return in about 4 weeks (around 6/19/2017).

## 2017-05-22 NOTE — Clinical Note
Hi. Just saw Ms. Hamilton, her toe wart responded great to candida, repeated the cycle today. She also has some acne issues and I was going to start her on isotretinoin today. She informed me that you may be doing some foot surgery in the near future and wanted to run it by you in case you didn't want her on isotretinoin prior to surgery. Let me know your thoughts. Thanks!

## 2017-05-30 ENCOUNTER — OFFICE VISIT (OUTPATIENT)
Dept: PODIATRY | Facility: CLINIC | Age: 22
End: 2017-05-30
Payer: COMMERCIAL

## 2017-05-30 DIAGNOSIS — Q66.6 PES VALGUS: Primary | ICD-10-CM

## 2017-05-30 DIAGNOSIS — M20.10 HALLUX ABDUCTO VALGUS, UNSPECIFIED LATERALITY: ICD-10-CM

## 2017-05-30 DIAGNOSIS — M25.375 FOOT JOINT INSTABILITY, LEFT: ICD-10-CM

## 2017-05-30 DIAGNOSIS — M20.42 HAMMER TOE OF LEFT FOOT: ICD-10-CM

## 2017-05-30 PROCEDURE — 99999 PR PBB SHADOW E&M-EST. PATIENT-LVL II: CPT | Mod: PBBFAC,,,

## 2017-05-30 PROCEDURE — 99213 OFFICE O/P EST LOW 20 MIN: CPT | Mod: S$GLB,,,

## 2017-05-31 RX ORDER — LIDOCAINE HYDROCHLORIDE 10 MG/ML
1 INJECTION, SOLUTION EPIDURAL; INFILTRATION; INTRACAUDAL; PERINEURAL ONCE
Status: CANCELLED | OUTPATIENT
Start: 2017-05-31 | End: 2017-05-31

## 2017-05-31 NOTE — PROGRESS NOTES
Subjective:       Patient ID: Terra Hamilton is a 21 y.o. female.    Chief Complaint: Follow-up (talk about surgery )    HPI  Patient returns stating her warts have resolved. She is here to discuss bunion and flat foot surgery that we previously discussed.  Also reporting that her left 5th toe is turning inward and hurts.    Review of Systems  ROS:  Constitution: Negative for chills, fever, weakness and malaise/fatigue.   HEENT: Negative for headaches.   Cardiovascular: Negative for chest pain and claudication.   Respiratory: Negative for cough and shortness of breath.   Musculoskeletal: Positive for foot pain.  Negative for muscle cramps and muscle weakness.   Gastrointestinal: Negative for nausea and vomiting.   Neurological: Negative for numbness and paresthesias.   Dermatological: Negative for wound.        Objective:      Physical Exam  Constitutional:  Patient is oriented to person, place, and time. Vital signs are normal.  Appears well-developed and well-nourished.     Vascular:  Dorsalis pedis pulses are 2+ on the right side, and 2+ on the left side.   Posterior tibial pulses are 2+ on the right side, and 2+ on the left side.   + digital hair growth, capillary fill time to all toes <3 seconds, no swelling    Skin/Dermatological:  Skin is warm and intact.  No cyanosis or clubbing.  No rashes noted.  No open wounds.  Verrucae left distal 5th toe and lateral mid 3rd toe 50 % resolved    Musculoskeletal:      B/L HAV and collapsing flexible arches, tenderness b/l arches, + too many toe sign.  Left fifth toe in varus.       Neurological:  No deficits to sharp/dull, light touch or vibratory sensation.   Muscle strength to tibialis anterior, extensor hallucis longus, extensor digitorum longus, peroneal muscles, flexor hallucis/digotorum longus, posterior tibial and gastrosoleal complex is 5/5, normal tone without assymmetry   Patellar reflexes are 2+ on the right side and 2+ on the left side.  Achilles  reflexes are 2+ on the right side and 2+ on the left side.        Assessment:       1. Pes valgus    2. Hallux abducto valgus, unspecified laterality    3. Hammer toe of left foot    4. Foot joint instability, left        Plan:       Pes valgus    Hallux abducto valgus, unspecified laterality    Hammer toe of left foot    Foot joint instability, left            Failed conservative therapy.  Plan for ORIF left subtalar joint with Hyprocure, left Travis bunionectomy, left fifth toe arthroplasty with derotational skinplasty.  The risks, benefits, complications, treatment options, and expected outcomes were discussed with the patient who verbalized understanding.

## 2017-06-02 DIAGNOSIS — M20.12 HALLUX ABDUCTO VALGUS, LEFT: Primary | ICD-10-CM

## 2017-06-02 DIAGNOSIS — M25.375 FOOT JOINT INSTABILITY, LEFT: ICD-10-CM

## 2017-06-02 DIAGNOSIS — M20.42 HAMMER TOE OF LEFT FOOT: ICD-10-CM

## 2017-06-27 ENCOUNTER — TELEPHONE (OUTPATIENT)
Dept: DERMATOLOGY | Facility: CLINIC | Age: 22
End: 2017-06-27

## 2017-06-27 ENCOUNTER — OFFICE VISIT (OUTPATIENT)
Dept: DERMATOLOGY | Facility: CLINIC | Age: 22
End: 2017-06-27
Payer: COMMERCIAL

## 2017-06-27 ENCOUNTER — LAB VISIT (OUTPATIENT)
Dept: LAB | Facility: HOSPITAL | Age: 22
End: 2017-06-27
Attending: DERMATOLOGY
Payer: COMMERCIAL

## 2017-06-27 VITALS — BODY MASS INDEX: 42.29 KG/M2 | HEIGHT: 61 IN | WEIGHT: 224 LBS | RESPIRATION RATE: 18 BRPM

## 2017-06-27 DIAGNOSIS — Z79.899 HIGH RISK MEDICATION USE: ICD-10-CM

## 2017-06-27 DIAGNOSIS — Z79.899 HIGH RISK MEDICATION USE: Primary | ICD-10-CM

## 2017-06-27 DIAGNOSIS — B07.9 VIRAL WARTS, UNSPECIFIED TYPE: ICD-10-CM

## 2017-06-27 DIAGNOSIS — L70.0 ACNE VULGARIS: ICD-10-CM

## 2017-06-27 LAB — B-HCG UR QL: NEGATIVE

## 2017-06-27 PROCEDURE — 81025 URINE PREGNANCY TEST: CPT | Mod: PO

## 2017-06-27 PROCEDURE — 99214 OFFICE O/P EST MOD 30 MIN: CPT | Mod: 25,S$GLB,, | Performed by: DERMATOLOGY

## 2017-06-27 PROCEDURE — 11900 INJECT SKIN LESIONS </W 7: CPT | Mod: S$GLB,,, | Performed by: DERMATOLOGY

## 2017-06-27 PROCEDURE — 99999 PR PBB SHADOW E&M-EST. PATIENT-LVL II: CPT | Mod: PBBFAC,,, | Performed by: DERMATOLOGY

## 2017-06-27 RX ORDER — SPIRONOLACTONE 50 MG/1
TABLET, FILM COATED ORAL
Qty: 60 TABLET | Refills: 3 | Status: SHIPPED | OUTPATIENT
Start: 2017-06-27 | End: 2017-12-14

## 2017-06-27 NOTE — PROGRESS NOTES
Subjective:       Patient ID:  Terra Hamilton is a 21 y.o. female who presents for   Chief Complaint   Patient presents with    Follow-up     Last seen 5-22-17   Pt here for follow up Acne.- face- was stable when she began birth control -norgestimate-ethinyl estradiol (ORTHO TRI-CYCLEN LO) 0.18/0.215/0.25 mg-25 mcg table began April 2017 - worsened since starting.  Still with breakouts around chin and forehead. Few cystic lesions  Pt stated she now has acne on neck   Treating acne w/ tazarotene (TAZORAC) 0.05 % Cream- Thin film to face qhs, take night          Viral warts L elbow  Was treated 4-2017 w/ Liquid nitrogen cryosurgery is applied to 1 verruca L elbow s/p candida Ag x 2. Desires repeat candida today.   Schedule for surgery w/ Dr Miguel 7-26-17   To begin third yr at \[employer]\"" late Aug 2017                Review of Systems   Skin: Positive for activity-related sunscreen use. Negative for dry skin, sensitivity to antibiotic ointment and sensitivity to bandage adhesive.   Hematologic/Lymphatic: Does not bruise/bleed easily.        Objective:    Physical Exam   Constitutional: She appears well-developed and well-nourished. No distress.   HENT:   Mouth/Throat: Lips normal.    Eyes: Lids are normal.  No conjunctival no injection.   Neurological: She is alert and oriented to person, place, and time. She is not disoriented.   Psychiatric: She has a normal mood and affect.   Skin:   Areas Examined (abnormalities noted in diagram):   Head / Face Inspection Performed  Neck Inspection Performed  Chest / Axilla Inspection Performed  RUE Inspected  LUE Inspection Performed                   Diagram Legend     Erythematous scaling macule/papule c/w actinic keratosis       Vascular papule c/w angioma      Pigmented verrucoid papule/plaque c/w seborrheic keratosis      Yellow umbilicated papule c/w sebaceous hyperplasia      Irregularly shaped tan macule c/w lentigo     1-2 mm smooth white papules consistent with  Milia      Movable subcutaneous cyst with punctum c/w epidermal inclusion cyst      Subcutaneous movable cyst c/w pilar cyst      Firm pink to brown papule c/w dermatofibroma      Pedunculated fleshy papule(s) c/w skin tag(s)      Evenly pigmented macule c/w junctional nevus     Mildly variegated pigmented, slightly irregular-bordered macule c/w mildly atypical nevus      Flesh colored to evenly pigmented papule c/w intradermal nevus       Pink pearly papule/plaque c/w basal cell carcinoma      Erythematous hyperkeratotic cursted plaque c/w SCC      Surgical scar with no sign of skin cancer recurrence      Open and closed comedones      Inflammatory papules and pustules      Verrucoid papule consistent consistent with wart     Erythematous eczematous patches and plaques     Dystrophic onycholytic nail with subungual debris c/w onychomycosis     Umbilicated papule    Erythematous-base heme-crusted tan verrucoid plaque consistent with inflamed seborrheic keratosis     Erythematous Silvery Scaling Plaque c/w Psoriasis     See annotation      Assessment / Plan:        High risk medication use  -     Pregnancy, urine rapid; Future  Negative UPT today, will confirm in ipledge  Will repeat in 1 month  Discussed benefits and risks of therapy including but not limited to breakthrough bleeding, breast tenderness, and elevated potassium levels which may give symptoms of fatigue, palpitations, and nausea. Patient should limit potassium intake - avoid potassium supplements or salt substitutes, limit bananas and citrus fruits. Pregnancy must be avoided while taking spironolactone.    Acne vulgaris  Flared since starting OCP  Continue tazorac  Plan to start spironolactone while waiting to start isotretinoin late August (per podiatry pt should be 1 month post op before starting)- foot surgery end of July.   -     spironolactone (ALDACTONE) 50 MG tablet; Start with 1 po qd, increase to 2 po qd as tolerated  Dispense: 60 tablet;  Refill: 3    Viral warts, unspecified type  Stable  Continue wart cream  Declines cryo today       candida albicans skin test skin test 0.1 mL; Inject 0.1 mLs into the skin one time.  Procedure note for Candida Antigen injection:    Discussed risks of procedure including local redness, swelling, and lymph node enlargement. Verbal consent obtained. Area cleaned with alcohol. 0.3cc of Candida antigen injected intradermally at the base of the verruca. Patient tolerated well.   This was patient's 3rd cycle of Candida Antigen.                Return in about 4 weeks (around 7/25/2017).

## 2017-06-27 NOTE — TELEPHONE ENCOUNTER
----- Message from Michaela Caldwell MD sent at 6/27/2017  3:36 PM CDT -----  Negative UPT, ok to confirm in ipledge.

## 2017-07-11 ENCOUNTER — OFFICE VISIT (OUTPATIENT)
Dept: FAMILY MEDICINE | Facility: CLINIC | Age: 22
End: 2017-07-11
Payer: COMMERCIAL

## 2017-07-11 VITALS
WEIGHT: 225.31 LBS | DIASTOLIC BLOOD PRESSURE: 74 MMHG | SYSTOLIC BLOOD PRESSURE: 110 MMHG | HEIGHT: 61 IN | BODY MASS INDEX: 42.54 KG/M2 | HEART RATE: 94 BPM | RESPIRATION RATE: 16 BRPM

## 2017-07-11 DIAGNOSIS — M20.10 HALLUX ABDUCTO VALGUS, UNSPECIFIED LATERALITY: ICD-10-CM

## 2017-07-11 DIAGNOSIS — G40.802 OTHER EPILEPSY WITHOUT STATUS EPILEPTICUS, NOT INTRACTABLE: Primary | ICD-10-CM

## 2017-07-11 DIAGNOSIS — Z01.818 PREOP EXAMINATION: ICD-10-CM

## 2017-07-11 PROCEDURE — 99999 PR PBB SHADOW E&M-EST. PATIENT-LVL III: CPT | Mod: PBBFAC,,, | Performed by: FAMILY MEDICINE

## 2017-07-11 PROCEDURE — 99213 OFFICE O/P EST LOW 20 MIN: CPT | Mod: S$GLB,,, | Performed by: FAMILY MEDICINE

## 2017-07-11 NOTE — Clinical Note
Patient seen back for pre-op exam.  Apparently the surgery was delayed and repeat clearance was required.  She may proceed as planned.  I don't personally feel that there is any need to repeat any lab tests or any pre-op testing but if Dr. Obrien or if the anesthesiologist will require something then please let us know.

## 2017-07-11 NOTE — PROGRESS NOTES
Subjective:     THIS DOCUMENT WAS MADE IN PART WITH [employer] DICTATION SOFTWARE.  OCCASIONALLY THIS SOFTWARE WILL MISINTERPRET WORDS OR PHRASES.     Patient ID: Terra Hamilton is a 21 y.o. female.    Chief Complaint: Pre-op Exam (done originally in May but sx was postponed so needs new pre op )    HPI     Reschedule for 7/26 with Dr. Obrien  I saw Terra a few months ago for preop clearance.  But the surgery was delayed or postponed and she was told she needed another clearance.  She has been well and she reports no recent illness.  She reported some occasional upset stomach after her menstrual cycle but otherwise has been fine.  No chest pain or shortness of breath.  No infections no fever no skin problems or rashes.    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/04/2017     Resolved Ambulatory Problems     Diagnosis Date Noted    No Resolved Ambulatory Problems     Past Medical History:   Diagnosis Date    Focal seizures     History of polycystic ovarian disease     Speech delay      Current Outpatient Prescriptions on File Prior to Visit   Medication Sig Dispense Refill    CLINDAGEL 1 % gel Thin film to scalp bid 1 Bottle 3    clobetasol (CLOBEX) 0.05 % shampoo Wash scalp and soak 15 min prior to rinsing, repeat 3x/week 118 mL 6    clobetasol (TEMOVATE) 0.05 % external solution Use on scalp one - two times daily as needed 60 mL 3    ethosuximide (ZARONTIN) 250 mg Cap       lamotrigine (LAMICTAL) 200 MG tablet One twice daily 60 tablet 0    norgestimate-ethinyl estradiol (ORTHO TRI-CYCLEN LO) 0.18/0.215/0.25 mg-25 mcg tablet Take 1 tablet by mouth once daily. 30 tablet 5    spironolactone (ALDACTONE) 50 MG tablet Start with 1 po qd,  increase to 2 po qd as tolerated 60 tablet 3    tazarotene (TAZORAC) 0.05 % Crea cream Thin film to face qhs, take night off if irritation develops 60 g 3     No current facility-administered medications on file prior to visit.      Review of patient's allergies indicates:  No Known Allergies  Social History   Substance Use Topics    Smoking status: Never Smoker    Smokeless tobacco: Never Used    Alcohol use Yes      Comment: only on special occasions     Family History   Problem Relation Age of Onset    Hypertension Mother     Heart disease Mother     Arthritis Mother     Cholelithiasis Mother     Hypertension Father     Asthma Sister     Endometriosis Sister     Anemia Sister     Hypertension Maternal Grandmother     Heart disease Maternal Grandfather     Breast cancer Paternal Grandmother     Myasthenia gravis Paternal Grandmother     Myasthenia gravis Paternal Grandfather     Diabetes Paternal Grandfather     Irritable bowel syndrome Maternal Aunt     Ulcerative colitis Maternal Uncle        Review of Systems   Constitutional: Negative for fatigue, fever and unexpected weight change.   HENT: Negative for sinus pressure and trouble swallowing.    Eyes: Negative for pain and visual disturbance.   Respiratory: Negative for cough, chest tightness and shortness of breath.    Cardiovascular: Negative for chest pain, palpitations and leg swelling.   Gastrointestinal: Negative for abdominal pain, blood in stool, constipation, diarrhea, nausea and vomiting.   Genitourinary: Negative for dysuria, frequency and hematuria.   Musculoskeletal: Negative for arthralgias, gait problem, myalgias and neck pain.   Skin: Negative for rash and wound.   Neurological: Negative for dizziness, tremors, syncope, numbness and headaches.   Psychiatric/Behavioral: Negative for dysphoric mood and sleep disturbance. The patient is not nervous/anxious.        Objective:       Vitals:    07/11/17 1128   BP: 110/74   BP  "Location: Left arm   Patient Position: Sitting   BP Method: Manual   Pulse: 94   Resp: 16   Weight: 102.2 kg (225 lb 5 oz)   Height: 5' 1" (1.549 m)       Physical Exam   Constitutional: She is oriented to person, place, and time. She appears well-developed and well-nourished.   HENT:   Head: Normocephalic and atraumatic.   Right Ear: Tympanic membrane, external ear and ear canal normal.   Left Ear: Tympanic membrane, external ear and ear canal normal.   Nose: Nose normal.   Mouth/Throat: Oropharynx is clear and moist. No oropharyngeal exudate.   Eyes: Conjunctivae and EOM are normal. Pupils are equal, round, and reactive to light. Right eye exhibits no discharge. Left eye exhibits no discharge. No scleral icterus.   Neck: Neck supple. No JVD present. No thyromegaly present.   Cardiovascular: Normal rate, regular rhythm and normal heart sounds.  Exam reveals no friction rub.    No murmur heard.  Pulmonary/Chest: Effort normal and breath sounds normal. No respiratory distress. She has no rales.   Abdominal: Soft. Bowel sounds are normal. She exhibits no distension. There is no tenderness.   Lymphadenopathy:     She has no cervical adenopathy.   Neurological: She is alert and oriented to person, place, and time.   Skin: No rash noted. She is not diaphoretic.   Psychiatric: She has a normal mood and affect. Her behavior is normal.   Vitals reviewed.      Assessment:       1. Other epilepsy without status epilepticus, not intractable    2. Hallux abducto valgus, unspecified laterality    3. Preop examination        Plan:       Terra was seen today for pre-op exam.    Diagnoses and all orders for this visit:    Other epilepsy without status epilepticus, not intractable    Hallux abducto valgus, unspecified laterality    Preop examination     exam and history remained stable.  She can proceed with surgery as planned.       "

## 2017-07-24 ENCOUNTER — TELEPHONE (OUTPATIENT)
Dept: DERMATOLOGY | Facility: CLINIC | Age: 22
End: 2017-07-24

## 2017-07-24 ENCOUNTER — LAB VISIT (OUTPATIENT)
Dept: LAB | Facility: HOSPITAL | Age: 22
End: 2017-07-24
Attending: DERMATOLOGY
Payer: COMMERCIAL

## 2017-07-24 ENCOUNTER — OFFICE VISIT (OUTPATIENT)
Dept: DERMATOLOGY | Facility: CLINIC | Age: 22
End: 2017-07-24
Payer: COMMERCIAL

## 2017-07-24 VITALS — WEIGHT: 225 LBS | BODY MASS INDEX: 42.48 KG/M2 | HEIGHT: 61 IN

## 2017-07-24 DIAGNOSIS — Z79.899 HIGH RISK MEDICATION USE: ICD-10-CM

## 2017-07-24 DIAGNOSIS — L70.0 ACNE VULGARIS: Primary | ICD-10-CM

## 2017-07-24 DIAGNOSIS — B07.9 VIRAL WARTS, UNSPECIFIED TYPE: ICD-10-CM

## 2017-07-24 LAB — B-HCG UR QL: NEGATIVE

## 2017-07-24 PROCEDURE — 17110 DESTRUCTION B9 LES UP TO 14: CPT | Mod: S$GLB,,, | Performed by: DERMATOLOGY

## 2017-07-24 PROCEDURE — 99999 PR PBB SHADOW E&M-EST. PATIENT-LVL II: CPT | Mod: PBBFAC,,, | Performed by: DERMATOLOGY

## 2017-07-24 PROCEDURE — 81025 URINE PREGNANCY TEST: CPT | Mod: PO

## 2017-07-24 PROCEDURE — 99213 OFFICE O/P EST LOW 20 MIN: CPT | Mod: 25,S$GLB,, | Performed by: DERMATOLOGY

## 2017-07-24 NOTE — TELEPHONE ENCOUNTER
----- Message from Michaela Caldwell MD sent at 7/24/2017  1:32 PM CDT -----  Negative UPT, please confirm in ipledge

## 2017-07-24 NOTE — Clinical Note
Good Afternoon,   I have been seeing this patient for acne. Our plan was to start isotretinoin in 4 weeks but patient is moving to [place]. Can you schedule a 4 week f/u with Dr. Raza or Michaela Pond to start accutane? Thanks!

## 2017-07-24 NOTE — TELEPHONE ENCOUNTER
----- Message from Mel Raza MD sent at 7/24/2017  4:01 PM CDT -----  Please schedule her a f/u with Michaela    ----- Message -----  From: Mireille Snell LPN  Sent: 7/24/2017   1:28 PM  To: Mel Raza MD    I scheduled pt for 9/13/17 @ 8:45am.  I informed pt mother of this appt and LVM for pt.  Did you need for her to get in earlier then this date?  ----- Message -----  From: Michaela Caldwell MD  Sent: 7/24/2017  12:52 PM  To: [facility] Staff    Good Afternoon,     I have been seeing this patient for acne. Our plan was to start isotretinoin in 4 weeks but patient is moving to [place]. Can you schedule a 4 week f/u with Dr. Raza or Michaela Pond to start accutane? Thanks!        LVM for pt to call back and get in earlier with Michaela (NP). Pt has an appt with Dr Raza on 9/13/17.  This needs to be rescheduled to an earlier appt with Michaela

## 2017-07-24 NOTE — PROGRESS NOTES
Subjective:       Patient ID:  Terra Hamilton is a 21 y.o. female who presents for   Chief Complaint   Patient presents with    Follow-up    Warts    Acne     Last seen 6-   Pt here for 1 month follow up Acne vulgaris using  Tazorac 0.05% cream qhs, clindagel 1% qod , Pt was started spironolactone (ALDACTONE) 50 MG tablet; 1 po qd at last visit - improved but flares  Follow up viral warts Left elbow was treated w/ cryo applied to 1 verruca L elbow s/p candida Ag x 3 & using wart cream qd- little improvement    Waiting to start isotretinoin late August (per podiatry pt should be 1 month post op before starting)- foot surgery scheduled for surgery w/ Dr Miguel 7-26-17   To begin third yr at [employer] [facility] [place] late Aug 2017                                feels face is better with spironolactone, worse when takes bid . Better when takes just qday    Review of Systems   Skin: Positive for activity-related sunscreen use. Negative for daily sunscreen use and recent sunburn.        Objective:    Physical Exam   Constitutional: She appears well-developed and well-nourished. No distress.   HENT:   Mouth/Throat: Lips normal.    Eyes: Lids are normal.  No conjunctival no injection.   Neurological: She is alert and oriented to person, place, and time. She is not disoriented.   Psychiatric: She has a normal mood and affect.   Skin:   Areas Examined (abnormalities noted in diagram):   Head / Face Inspection Performed  Neck Inspection Performed  Chest / Axilla Inspection Performed  Back Inspection Performed  RUE Inspected  LUE Inspection Performed                   Diagram Legend     Erythematous scaling macule/papule c/w actinic keratosis       Vascular papule c/w angioma      Pigmented verrucoid papule/plaque c/w seborrheic keratosis      Yellow umbilicated papule c/w sebaceous hyperplasia      Irregularly shaped tan macule c/w lentigo     1-2 mm smooth white papules consistent with Milia      Movable subcutaneous cyst  with punctum c/w epidermal inclusion cyst      Subcutaneous movable cyst c/w pilar cyst      Firm pink to brown papule c/w dermatofibroma      Pedunculated fleshy papule(s) c/w skin tag(s)      Evenly pigmented macule c/w junctional nevus     Mildly variegated pigmented, slightly irregular-bordered macule c/w mildly atypical nevus      Flesh colored to evenly pigmented papule c/w intradermal nevus       Pink pearly papule/plaque c/w basal cell carcinoma      Erythematous hyperkeratotic cursted plaque c/w SCC      Surgical scar with no sign of skin cancer recurrence      Open and closed comedones      Inflammatory papules and pustules      Verrucoid papule consistent consistent with wart     Erythematous eczematous patches and plaques     Dystrophic onycholytic nail with subungual debris c/w onychomycosis     Umbilicated papule    Erythematous-base heme-crusted tan verrucoid plaque consistent with inflamed seborrheic keratosis     Erythematous Silvery Scaling Plaque c/w Psoriasis     See annotation      Assessment / Plan:        Acne vulgaris  Still with persistent disease  Continue qday 50mg spironolactone  Continue OCP  Repeat UPT today  Plan to start isotretinoin next month after foot surgery - would like to follow up with Dr. Raza since patient is moving to  for school   Will schedule in 4 weeks with Dr. Raza    High risk medication use  -     Pregnancy, urine rapid; Future    Viral warts, unspecified type, elbow  Failed compound cream and cryo in past  S/p candida Ag x 3  Cryo today to 2 lesions   Cryosurgery procedure note:    Verbal consent from the patient is obtained. Liquid nitrogen cryosurgery is applied to 2 lesions to produce a freeze injury. The patient is aware that blisters may form and is instructed on wound care with gentle cleansing and use of vaseline ointment to keep moist until healed. The patient is supplied a handout on cryosurgery and is instructed to call if lesions do not completely  resolve. Risk of dyspigmentation discussed.                Return in about 4 weeks (around 8/21/2017).

## 2017-07-25 ENCOUNTER — ANESTHESIA EVENT (OUTPATIENT)
Dept: SURGERY | Facility: HOSPITAL | Age: 22
End: 2017-07-25
Payer: COMMERCIAL

## 2017-07-26 ENCOUNTER — HOSPITAL ENCOUNTER (OUTPATIENT)
Dept: RADIOLOGY | Facility: HOSPITAL | Age: 22
Discharge: HOME | End: 2017-07-26
Payer: COMMERCIAL

## 2017-07-26 ENCOUNTER — ANESTHESIA (OUTPATIENT)
Dept: SURGERY | Facility: HOSPITAL | Age: 22
End: 2017-07-26
Payer: COMMERCIAL

## 2017-07-26 ENCOUNTER — HOSPITAL ENCOUNTER (OUTPATIENT)
Facility: HOSPITAL | Age: 22
Discharge: HOME | End: 2017-07-26
Payer: COMMERCIAL

## 2017-07-26 DIAGNOSIS — M25.375 FOOT JOINT INSTABILITY, LEFT: ICD-10-CM

## 2017-07-26 DIAGNOSIS — M20.10 HALLUX ABDUCTO VALGUS, UNSPECIFIED LATERALITY: ICD-10-CM

## 2017-07-26 DIAGNOSIS — M20.42 HAMMER TOE OF LEFT FOOT: ICD-10-CM

## 2017-07-26 DIAGNOSIS — M20.12 HALLUX ABDUCTO VALGUS, LEFT: Primary | ICD-10-CM

## 2017-07-26 DIAGNOSIS — M79.672 LEFT FOOT PAIN: ICD-10-CM

## 2017-07-26 DIAGNOSIS — Q66.6 PES VALGUS: ICD-10-CM

## 2017-07-26 LAB
B-HCG UR QL: NEGATIVE
CTP QC/QA: YES

## 2017-07-26 PROCEDURE — D9220A PRA ANESTHESIA: Mod: ANES,,, | Performed by: ANESTHESIOLOGY

## 2017-07-26 PROCEDURE — 25000003 PHARM REV CODE 250: Mod: PO | Performed by: ANESTHESIOLOGY

## 2017-07-26 PROCEDURE — 63600175 PHARM REV CODE 636 W HCPCS: Mod: PO

## 2017-07-26 PROCEDURE — 37000008 HC ANESTHESIA 1ST 15 MINUTES: Mod: PO

## 2017-07-26 PROCEDURE — 25000003 PHARM REV CODE 250: Mod: PO

## 2017-07-26 PROCEDURE — C1713 ANCHOR/SCREW BN/BN,TIS/BN: HCPCS | Mod: PO

## 2017-07-26 PROCEDURE — D9220A PRA ANESTHESIA: Mod: CRNA,,,

## 2017-07-26 PROCEDURE — 36000709 HC OR TIME LEV III EA ADD 15 MIN: Mod: PO

## 2017-07-26 PROCEDURE — 71000033 HC RECOVERY, INTIAL HOUR: Mod: PO

## 2017-07-26 PROCEDURE — 27800903 OPTIME MED/SURG SUP & DEVICES OTHER IMPLANTS: Mod: PO

## 2017-07-26 PROCEDURE — C1769 GUIDE WIRE: HCPCS | Mod: PO

## 2017-07-26 PROCEDURE — 76000 FLUOROSCOPY <1 HR PHYS/QHP: CPT | Mod: TC,PO

## 2017-07-26 PROCEDURE — 37000009 HC ANESTHESIA EA ADD 15 MINS: Mod: PO

## 2017-07-26 PROCEDURE — 28286 REPAIR OF HAMMERTOE: CPT | Mod: 51,T4,,

## 2017-07-26 PROCEDURE — S0020 INJECTION, BUPIVICAINE HYDRO: HCPCS | Mod: PO

## 2017-07-26 PROCEDURE — 63600175 PHARM REV CODE 636 W HCPCS: Mod: PO | Performed by: NURSE ANESTHETIST, CERTIFIED REGISTERED

## 2017-07-26 PROCEDURE — C9290 INJ, BUPIVACAINE LIPOSOME: HCPCS | Mod: PO

## 2017-07-26 PROCEDURE — 36000708 HC OR TIME LEV III 1ST 15 MIN: Mod: PO

## 2017-07-26 PROCEDURE — 28585 REPAIR FOOT DISLOCATION: CPT | Mod: LT,,,

## 2017-07-26 PROCEDURE — 27201423 OPTIME MED/SURG SUP & DEVICES STERILE SUPPLY: Mod: PO

## 2017-07-26 PROCEDURE — 81025 URINE PREGNANCY TEST: CPT | Mod: PO | Performed by: ANESTHESIOLOGY

## 2017-07-26 PROCEDURE — 28296 COR HLX VLGS DSTL MTAR OSTEO: CPT | Mod: 51,TA,,

## 2017-07-26 DEVICE — SCREW COMPRESSION FT 2.5X14MM: Type: IMPLANTABLE DEVICE | Site: FOOT | Status: FUNCTIONAL

## 2017-07-26 DEVICE — SCREW COMPR FT 2.5 MICRO 12MM: Type: IMPLANTABLE DEVICE | Site: FOOT | Status: FUNCTIONAL

## 2017-07-26 RX ORDER — MIDAZOLAM HYDROCHLORIDE 1 MG/ML
INJECTION, SOLUTION INTRAMUSCULAR; INTRAVENOUS
Status: DISCONTINUED | OUTPATIENT
Start: 2017-07-26 | End: 2017-07-26

## 2017-07-26 RX ORDER — ONDANSETRON 2 MG/ML
INJECTION INTRAMUSCULAR; INTRAVENOUS
Status: DISCONTINUED | OUTPATIENT
Start: 2017-07-26 | End: 2017-07-26

## 2017-07-26 RX ORDER — OXYCODONE AND ACETAMINOPHEN 7.5; 325 MG/1; MG/1
2 TABLET ORAL EVERY 6 HOURS PRN
Qty: 50 TABLET | Refills: 0 | Status: SHIPPED | OUTPATIENT
Start: 2017-07-26 | End: 2017-12-14

## 2017-07-26 RX ORDER — PHENYLEPHRINE HYDROCHLORIDE 10 MG/ML
INJECTION INTRAVENOUS
Status: DISCONTINUED | OUTPATIENT
Start: 2017-07-26 | End: 2017-07-26

## 2017-07-26 RX ORDER — HYDROCODONE BITARTRATE AND ACETAMINOPHEN 5; 325 MG/1; MG/1
1 TABLET ORAL EVERY 4 HOURS PRN
Status: DISCONTINUED | OUTPATIENT
Start: 2017-07-26 | End: 2017-07-26 | Stop reason: HOSPADM

## 2017-07-26 RX ORDER — ACETAMINOPHEN 10 MG/ML
INJECTION, SOLUTION INTRAVENOUS
Status: DISCONTINUED | OUTPATIENT
Start: 2017-07-26 | End: 2017-07-26

## 2017-07-26 RX ORDER — BUPIVACAINE HYDROCHLORIDE 7.5 MG/ML
INJECTION, SOLUTION EPIDURAL; RETROBULBAR
Status: DISCONTINUED | OUTPATIENT
Start: 2017-07-26 | End: 2017-07-26 | Stop reason: HOSPADM

## 2017-07-26 RX ORDER — LIDOCAINE HCL/PF 100 MG/5ML
SYRINGE (ML) INTRAVENOUS
Status: DISCONTINUED | OUTPATIENT
Start: 2017-07-26 | End: 2017-07-26

## 2017-07-26 RX ORDER — KETOROLAC TROMETHAMINE 30 MG/ML
INJECTION, SOLUTION INTRAMUSCULAR; INTRAVENOUS
Status: DISCONTINUED | OUTPATIENT
Start: 2017-07-26 | End: 2017-07-26

## 2017-07-26 RX ORDER — LIDOCAINE HYDROCHLORIDE 20 MG/ML
INJECTION, SOLUTION EPIDURAL; INFILTRATION; INTRACAUDAL; PERINEURAL
Status: DISCONTINUED | OUTPATIENT
Start: 2017-07-26 | End: 2017-07-26 | Stop reason: HOSPADM

## 2017-07-26 RX ORDER — LIDOCAINE HYDROCHLORIDE 10 MG/ML
1 INJECTION, SOLUTION EPIDURAL; INFILTRATION; INTRACAUDAL; PERINEURAL ONCE
Status: DISCONTINUED | OUTPATIENT
Start: 2017-07-26 | End: 2017-07-26

## 2017-07-26 RX ORDER — SODIUM CHLORIDE 0.9 % (FLUSH) 0.9 %
3 SYRINGE (ML) INJECTION
Status: DISCONTINUED | OUTPATIENT
Start: 2017-07-26 | End: 2017-07-26 | Stop reason: HOSPADM

## 2017-07-26 RX ORDER — FENTANYL CITRATE 50 UG/ML
25 INJECTION, SOLUTION INTRAMUSCULAR; INTRAVENOUS EVERY 5 MIN PRN
Status: DISCONTINUED | OUTPATIENT
Start: 2017-07-26 | End: 2017-07-26 | Stop reason: HOSPADM

## 2017-07-26 RX ORDER — SODIUM CHLORIDE, SODIUM LACTATE, POTASSIUM CHLORIDE, CALCIUM CHLORIDE 600; 310; 30; 20 MG/100ML; MG/100ML; MG/100ML; MG/100ML
INJECTION, SOLUTION INTRAVENOUS CONTINUOUS
Status: DISCONTINUED | OUTPATIENT
Start: 2017-07-26 | End: 2017-07-26 | Stop reason: HOSPADM

## 2017-07-26 RX ORDER — OXYCODONE AND ACETAMINOPHEN 5; 325 MG/1; MG/1
1 TABLET ORAL
Status: DISCONTINUED | OUTPATIENT
Start: 2017-07-26 | End: 2017-07-26 | Stop reason: HOSPADM

## 2017-07-26 RX ORDER — LIDOCAINE HYDROCHLORIDE 10 MG/ML
1 INJECTION, SOLUTION EPIDURAL; INFILTRATION; INTRACAUDAL; PERINEURAL ONCE
Status: DISCONTINUED | OUTPATIENT
Start: 2017-07-26 | End: 2017-07-26 | Stop reason: HOSPADM

## 2017-07-26 RX ORDER — DEXAMETHASONE SODIUM PHOSPHATE 4 MG/ML
INJECTION, SOLUTION INTRA-ARTICULAR; INTRALESIONAL; INTRAMUSCULAR; INTRAVENOUS; SOFT TISSUE
Status: DISCONTINUED | OUTPATIENT
Start: 2017-07-26 | End: 2017-07-26 | Stop reason: HOSPADM

## 2017-07-26 RX ORDER — PROPOFOL 10 MG/ML
VIAL (ML) INTRAVENOUS CONTINUOUS PRN
Status: DISCONTINUED | OUTPATIENT
Start: 2017-07-26 | End: 2017-07-26

## 2017-07-26 RX ORDER — FENTANYL CITRATE 50 UG/ML
INJECTION, SOLUTION INTRAMUSCULAR; INTRAVENOUS
Status: DISCONTINUED | OUTPATIENT
Start: 2017-07-26 | End: 2017-07-26

## 2017-07-26 RX ORDER — PROPOFOL 10 MG/ML
VIAL (ML) INTRAVENOUS
Status: DISCONTINUED | OUTPATIENT
Start: 2017-07-26 | End: 2017-07-26

## 2017-07-26 RX ADMIN — FENTANYL CITRATE 100 MCG: 50 INJECTION, SOLUTION INTRAMUSCULAR; INTRAVENOUS at 08:07

## 2017-07-26 RX ADMIN — PROPOFOL 40 MG: 10 INJECTION, EMULSION INTRAVENOUS at 08:07

## 2017-07-26 RX ADMIN — PHENYLEPHRINE HYDROCHLORIDE 50 MG: 10 INJECTION, SOLUTION INTRAMUSCULAR; INTRAVENOUS; SUBCUTANEOUS at 08:07

## 2017-07-26 RX ADMIN — PHENYLEPHRINE HYDROCHLORIDE 100 MG: 10 INJECTION, SOLUTION INTRAMUSCULAR; INTRAVENOUS; SUBCUTANEOUS at 08:07

## 2017-07-26 RX ADMIN — FENTANYL CITRATE 50 MCG: 50 INJECTION, SOLUTION INTRAMUSCULAR; INTRAVENOUS at 09:07

## 2017-07-26 RX ADMIN — KETOROLAC TROMETHAMINE 30 MG: 30 INJECTION, SOLUTION INTRAMUSCULAR; INTRAVENOUS at 08:07

## 2017-07-26 RX ADMIN — PROPOFOL 30 MG: 10 INJECTION, EMULSION INTRAVENOUS at 09:07

## 2017-07-26 RX ADMIN — LIDOCAINE HYDROCHLORIDE 75 MG: 20 INJECTION PARENTERAL at 08:07

## 2017-07-26 RX ADMIN — SODIUM CHLORIDE, SODIUM LACTATE, POTASSIUM CHLORIDE, AND CALCIUM CHLORIDE: .6; .31; .03; .02 INJECTION, SOLUTION INTRAVENOUS at 08:07

## 2017-07-26 RX ADMIN — SODIUM CHLORIDE, SODIUM LACTATE, POTASSIUM CHLORIDE, AND CALCIUM CHLORIDE: .6; .31; .03; .02 INJECTION, SOLUTION INTRAVENOUS at 07:07

## 2017-07-26 RX ADMIN — ACETAMINOPHEN 1000 MG: 10 INJECTION, SOLUTION INTRAVENOUS at 08:07

## 2017-07-26 RX ADMIN — ONDANSETRON 4 MG: 2 INJECTION, SOLUTION INTRAMUSCULAR; INTRAVENOUS at 08:07

## 2017-07-26 RX ADMIN — PROPOFOL 50 MG: 10 INJECTION, EMULSION INTRAVENOUS at 08:07

## 2017-07-26 RX ADMIN — FENTANYL CITRATE 25 MCG: 50 INJECTION, SOLUTION INTRAMUSCULAR; INTRAVENOUS at 08:07

## 2017-07-26 RX ADMIN — MIDAZOLAM HYDROCHLORIDE 2 MG: 1 INJECTION, SOLUTION INTRAMUSCULAR; INTRAVENOUS at 07:07

## 2017-07-26 RX ADMIN — PHENYLEPHRINE HYDROCHLORIDE 100 MG: 10 INJECTION, SOLUTION INTRAMUSCULAR; INTRAVENOUS; SUBCUTANEOUS at 09:07

## 2017-07-26 RX ADMIN — DEXTROSE 2 G: 50 INJECTION, SOLUTION INTRAVENOUS at 08:07

## 2017-07-26 RX ADMIN — PROPOFOL 120 MCG/KG/MIN: 10 INJECTION, EMULSION INTRAVENOUS at 08:07

## 2017-07-26 NOTE — H&P (VIEW-ONLY)
Subjective:     THIS DOCUMENT WAS MADE IN PART WITH [employer] DICTATION SOFTWARE.  OCCASIONALLY THIS SOFTWARE WILL MISINTERPRET WORDS OR PHRASES.     Patient ID: Terra Hamilton is a 21 y.o. female.    Chief Complaint: Pre-op Exam (done originally in May but sx was postponed so needs new pre op )    HPI     Reschedule for 7/26 with Dr. Obrien  I saw Terra a few months ago for preop clearance.  But the surgery was delayed or postponed and she was told she needed another clearance.  She has been well and she reports no recent illness.  She reported some occasional upset stomach after her menstrual cycle but otherwise has been fine.  No chest pain or shortness of breath.  No infections no fever no skin problems or rashes.    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/04/2017     Resolved Ambulatory Problems     Diagnosis Date Noted    No Resolved Ambulatory Problems     Past Medical History:   Diagnosis Date    Focal seizures     History of polycystic ovarian disease     Speech delay      Current Outpatient Prescriptions on File Prior to Visit   Medication Sig Dispense Refill    CLINDAGEL 1 % gel Thin film to scalp bid 1 Bottle 3    clobetasol (CLOBEX) 0.05 % shampoo Wash scalp and soak 15 min prior to rinsing, repeat 3x/week 118 mL 6    clobetasol (TEMOVATE) 0.05 % external solution Use on scalp one - two times daily as needed 60 mL 3    ethosuximide (ZARONTIN) 250 mg Cap       lamotrigine (LAMICTAL) 200 MG tablet One twice daily 60 tablet 0    norgestimate-ethinyl estradiol (ORTHO TRI-CYCLEN LO) 0.18/0.215/0.25 mg-25 mcg tablet Take 1 tablet by mouth once daily. 30 tablet 5    spironolactone (ALDACTONE) 50 MG tablet Start with 1 po qd,  increase to 2 po qd as tolerated 60 tablet 3    tazarotene (TAZORAC) 0.05 % Crea cream Thin film to face qhs, take night off if irritation develops 60 g 3     No current facility-administered medications on file prior to visit.      Review of patient's allergies indicates:  No Known Allergies  Social History   Substance Use Topics    Smoking status: Never Smoker    Smokeless tobacco: Never Used    Alcohol use Yes      Comment: only on special occasions     Family History   Problem Relation Age of Onset    Hypertension Mother     Heart disease Mother     Arthritis Mother     Cholelithiasis Mother     Hypertension Father     Asthma Sister     Endometriosis Sister     Anemia Sister     Hypertension Maternal Grandmother     Heart disease Maternal Grandfather     Breast cancer Paternal Grandmother     Myasthenia gravis Paternal Grandmother     Myasthenia gravis Paternal Grandfather     Diabetes Paternal Grandfather     Irritable bowel syndrome Maternal Aunt     Ulcerative colitis Maternal Uncle        Review of Systems   Constitutional: Negative for fatigue, fever and unexpected weight change.   HENT: Negative for sinus pressure and trouble swallowing.    Eyes: Negative for pain and visual disturbance.   Respiratory: Negative for cough, chest tightness and shortness of breath.    Cardiovascular: Negative for chest pain, palpitations and leg swelling.   Gastrointestinal: Negative for abdominal pain, blood in stool, constipation, diarrhea, nausea and vomiting.   Genitourinary: Negative for dysuria, frequency and hematuria.   Musculoskeletal: Negative for arthralgias, gait problem, myalgias and neck pain.   Skin: Negative for rash and wound.   Neurological: Negative for dizziness, tremors, syncope, numbness and headaches.   Psychiatric/Behavioral: Negative for dysphoric mood and sleep disturbance. The patient is not nervous/anxious.        Objective:       Vitals:    07/11/17 1128   BP: 110/74   BP  "Location: Left arm   Patient Position: Sitting   BP Method: Manual   Pulse: 94   Resp: 16   Weight: 102.2 kg (225 lb 5 oz)   Height: 5' 1" (1.549 m)       Physical Exam   Constitutional: She is oriented to person, place, and time. She appears well-developed and well-nourished.   HENT:   Head: Normocephalic and atraumatic.   Right Ear: Tympanic membrane, external ear and ear canal normal.   Left Ear: Tympanic membrane, external ear and ear canal normal.   Nose: Nose normal.   Mouth/Throat: Oropharynx is clear and moist. No oropharyngeal exudate.   Eyes: Conjunctivae and EOM are normal. Pupils are equal, round, and reactive to light. Right eye exhibits no discharge. Left eye exhibits no discharge. No scleral icterus.   Neck: Neck supple. No JVD present. No thyromegaly present.   Cardiovascular: Normal rate, regular rhythm and normal heart sounds.  Exam reveals no friction rub.    No murmur heard.  Pulmonary/Chest: Effort normal and breath sounds normal. No respiratory distress. She has no rales.   Abdominal: Soft. Bowel sounds are normal. She exhibits no distension. There is no tenderness.   Lymphadenopathy:     She has no cervical adenopathy.   Neurological: She is alert and oriented to person, place, and time.   Skin: No rash noted. She is not diaphoretic.   Psychiatric: She has a normal mood and affect. Her behavior is normal.   Vitals reviewed.      Assessment:       1. Other epilepsy without status epilepticus, not intractable    2. Hallux abducto valgus, unspecified laterality    3. Preop examination        Plan:       Terra was seen today for pre-op exam.    Diagnoses and all orders for this visit:    Other epilepsy without status epilepticus, not intractable    Hallux abducto valgus, unspecified laterality    Preop examination     exam and history remained stable.  She can proceed with surgery as planned.       "

## 2017-07-26 NOTE — INTERVAL H&P NOTE
The patient has been examined and the H&P has been reviewed:    I concur with the findings and no changes have occurred since H&P was written.    Anesthesia/Surgery risks, benefits and alternative options discussed and understood by patient/family.          Active Hospital Problems    Diagnosis  POA    Pes valgus [Q66.6]  Not Applicable      Resolved Hospital Problems    Diagnosis Date Resolved POA   No resolved problems to display.

## 2017-07-26 NOTE — H&P (VIEW-ONLY)
Subjective:       Patient ID:  Terra Hamilton is a 21 y.o. female who presents for   Chief Complaint   Patient presents with    Follow-up     Last seen 5-22-17   Pt here for follow up Acne.- face- was stable when she began birth control -norgestimate-ethinyl estradiol (ORTHO TRI-CYCLEN LO) 0.18/0.215/0.25 mg-25 mcg table began April 2017 - worsened since starting.  Still with breakouts around chin and forehead. Few cystic lesions  Pt stated she now has acne on neck   Treating acne w/ tazarotene (TAZORAC) 0.05 % Cream- Thin film to face qhs, take night          Viral warts L elbow  Was treated 4-2017 w/ Liquid nitrogen cryosurgery is applied to 1 verruca L elbow s/p candida Ag x 2. Desires repeat candida today.   Schedule for surgery w/ Dr Miguel 7-26-17   To begin third yr at [employer] late Aug 2017                Review of Systems   Skin: Positive for activity-related sunscreen use. Negative for dry skin, sensitivity to antibiotic ointment and sensitivity to bandage adhesive.   Hematologic/Lymphatic: Does not bruise/bleed easily.        Objective:    Physical Exam   Constitutional: She appears well-developed and well-nourished. No distress.   HENT:   Mouth/Throat: Lips normal.    Eyes: Lids are normal.  No conjunctival no injection.   Neurological: She is alert and oriented to person, place, and time. She is not disoriented.   Psychiatric: She has a normal mood and affect.   Skin:   Areas Examined (abnormalities noted in diagram):   Head / Face Inspection Performed  Neck Inspection Performed  Chest / Axilla Inspection Performed  RUE Inspected  LUE Inspection Performed                   Diagram Legend     Erythematous scaling macule/papule c/w actinic keratosis       Vascular papule c/w angioma      Pigmented verrucoid papule/plaque c/w seborrheic keratosis      Yellow umbilicated papule c/w sebaceous hyperplasia      Irregularly shaped tan macule c/w lentigo     1-2 mm smooth white papules consistent with  Milia      Movable subcutaneous cyst with punctum c/w epidermal inclusion cyst      Subcutaneous movable cyst c/w pilar cyst      Firm pink to brown papule c/w dermatofibroma      Pedunculated fleshy papule(s) c/w skin tag(s)      Evenly pigmented macule c/w junctional nevus     Mildly variegated pigmented, slightly irregular-bordered macule c/w mildly atypical nevus      Flesh colored to evenly pigmented papule c/w intradermal nevus       Pink pearly papule/plaque c/w basal cell carcinoma      Erythematous hyperkeratotic cursted plaque c/w SCC      Surgical scar with no sign of skin cancer recurrence      Open and closed comedones      Inflammatory papules and pustules      Verrucoid papule consistent consistent with wart     Erythematous eczematous patches and plaques     Dystrophic onycholytic nail with subungual debris c/w onychomycosis     Umbilicated papule    Erythematous-base heme-crusted tan verrucoid plaque consistent with inflamed seborrheic keratosis     Erythematous Silvery Scaling Plaque c/w Psoriasis     See annotation      Assessment / Plan:        High risk medication use  -     Pregnancy, urine rapid; Future  Negative UPT today, will confirm in ipledge  Will repeat in 1 month  Discussed benefits and risks of therapy including but not limited to breakthrough bleeding, breast tenderness, and elevated potassium levels which may give symptoms of fatigue, palpitations, and nausea. Patient should limit potassium intake - avoid potassium supplements or salt substitutes, limit bananas and citrus fruits. Pregnancy must be avoided while taking spironolactone.    Acne vulgaris  Flared since starting OCP  Continue tazorac  Plan to start spironolactone while waiting to start isotretinoin late August (per podiatry pt should be 1 month post op before starting)- foot surgery end of July.   -     spironolactone (ALDACTONE) 50 MG tablet; Start with 1 po qd, increase to 2 po qd as tolerated  Dispense: 60 tablet;  Refill: 3    Viral warts, unspecified type  Stable  Continue wart cream  Declines cryo today       candida albicans skin test skin test 0.1 mL; Inject 0.1 mLs into the skin one time.  Procedure note for Candida Antigen injection:    Discussed risks of procedure including local redness, swelling, and lymph node enlargement. Verbal consent obtained. Area cleaned with alcohol. 0.3cc of Candida antigen injected intradermally at the base of the verruca. Patient tolerated well.   This was patient's 3rd cycle of Candida Antigen.                Return in about 4 weeks (around 7/25/2017).

## 2017-07-26 NOTE — OP NOTE
DATE OF PROCEDURE:  07/26/2017    SURGEON:  Lm Obrien D.P.M.    ASSISTANT:  Cortney Wright.    PREOPERATIVE DIAGNOSES:  Left hallux abductor valgus, left fifth hammertoe with   varus rotation and left subtalar joint instability, pes valgus.    POSTOPERATIVE DIAGNOSES:  Left hallux abductor valgus, left fifth hammertoe with   varus rotation and left subtalar joint instability, pes valgus.    PROCEDURES:  1.  Left Travis bunionectomy.  2.  Left fifth toe arthroplasty derotational skin plasty, and open reduction   internal fixation, left subtalar joint with HyProCure implant.    ANESTHESIA:  Local with MAC.    HEMOSTASIS:  Ankle tourniquet at 225 mmHg pressure, time 76 minutes.    COMPLICATIONS:  None.    ESTIMATED BLOOD LOSS:  1 mL.    INDICATION FOR PROCEDURE:  The patient presented to clinic reporting chronic   instability and pain in her rearfoot consistent with subtalar joint instability   and she also noted to have a mild-to-moderate flatfoot deformity.  She was also   complaining of a painful bunion and a turned- in varus fifth toe which were all   painful with weight bearing.  She tried various things such as padding,   orthotics, and wide shoes, and none of this seemed to help.  Clinical exam   revealed a pronated foot with hallux valgus deformity and a varus fifth toe on   the left.  We counseled her on surgical intervention including open reduction   internal fixation of the subtalar joint, knowing that that would not solve all   of her problems, but certainly would stabilize the joint and raise the arch and   bunion and hammertoe and its indications, risks and complications.  She   verbalized understanding.  She presented all times with her mother who also   verified understanding of all the risks of the procedure.    DESCRIPTION OF PROCEDURE:  The patient was brought in the Operating Room, placed   on the operating room table in the supine position.  Following administration   of IV sedation, foot  was then prepped and draped in usual sterile fashion.  The   foot was exsanguinated and the ankle tourniquet was inflated to 250 mmHg   pressure.  Prior to IV sedation, an ankle block with 30 mL of 50:50 mix of 0.5%   Marcaine plain and 2% lidocaine were injected into the ankle joint and I also   did inject 8 mg of dexamethasone within the sinus tarsus.  A 1.5 to 2 cm   incision was made just distal to the fibular malleolus.  The incision was   carefully deepened down to deep fascia, taking care to retract any vital   neurovascular structures or cauterize any vessels .  A guide wire used to locate the axis of   the subtalar joint and then, a tenotomy scissors was used to remove all the   ligamentous and soft tissue attachments the subtalar joint.  A spacer starting with a   size 5.  HyProCure was placed over the guidewire and ultimately with the   assistance of C-arm, a size 5 implant was placed within the subtalar joint,   noting the talar first metatarsal angle was well aligned and better in   increasing the cyma line and improvement in the cyma line.  The area was then   irrigated with sterile normal saline and deep closure with 2-0 Vicryl, skin   closure achieved with 4-0 subcuticular Monocryl.  Attention was then directed to   the first metatarsophalangeal joint, where a 6 cm incision was just medial to   the extensor hallucis longus tendons up to mid distal shaft of the first   metatarsal to the base of the first metatarsal and distal proximal phalanx.    Incision was carried down to deep fascia, taking care to retract any vital   neurovascular structures or cauterize any vessels as necessary.  The   neurovascular bundle was isolated medially and retracted and a T-type   capsulotomy was then performed taking the capsule and periosteum from the first   MPJ.  A lateral release was performed by cutting the adductor tendon, as it   inserted in the base of proximal and released from their soft tissue and    ligamentous attachments.  There still was a deformity despite releasing the soft   cartilage.  To proceed with an Travis bunionectomy, sliding the head over   laterally and fixated with 2 headless Arthrex compression screw with reducing   the IM angle to under 5 degrees and tibial sesamoid position to 0.  The   redundant bone was removed with a sagittal saw proximally and I did take part of   the dorsal spur of the first metatarsal with sagittal saw capsule.  The area   was copiously irrigated with sterile normal saline with loading on the foot.    There was noted reduction in IM angle and good range of motion of the first MPJ   and reduction of the hallux abductor valgus angle.  The capsule and periosteum   were then reapproximated with 2-0 Vicryl, skin closure achieved with a running   subcuticular stitch with 4-0 Monocryl.  Attention was then directed to the fifth   toe where an elliptical incision was made from the proximal, lateral, distal,   and medial over the PIPJ.  The debrided skin was then removed with the sagittal   saw.  I cut the extensor tendon and did an arthroplasty of the PIPJ and removed   that prior to proximal phalanx, derotated the toe and reapproximated the   extensor tendon with 2-0 Vicryl and deep skin closure with 2-0, copiously   irrigated with sterile normal saline and then deep skin closure achieved with   2-0 Vicryl, skin closure achieved with 4-0 nylon using simple horizontal   suturing technique.  The toe was derotated in order and sutured in a rectus   position and with loading of the foot noted reduction of the varus deformity.    Upon release of the tourniquet, there was immediate reactive hyperemia to all   digits of left foot.  There were no complications.  The foot was then dressed   with Xeroform, 4 x 4s, Kerlix and Ace.  She tolerated the procedure and   anesthesia well and left the Recovery Room with vital signs stable.  She will be   discharged home with the following  written and verbal instructions, ice,   elevation, heel weightbearing only with postoperative shoe.  Keep dressing   clean, dry and intact.  Follow up with Dr. Obrien in 3 to 5 days.      SADIE/  dd: 07/26/2017 10:04:09 (CDT)  td: 07/26/2017 11:45:29 (CDT)  Doc ID   #[id number]  Job ID #[id number]    CC:

## 2017-07-26 NOTE — ANESTHESIA POSTPROCEDURE EVALUATION
"Anesthesia Post Evaluation    Patient: Terra Hamilton    Procedure(s) Performed: Procedure(s) (LRB):  Left Travis BUNIONECTOMY ([id number]) (Left)  Left 5th toe PIPJ ARTHROPLASTY (99274) (Left)  ORIF Subtalar joint with Hyprocure (25043) (Left)    Final Anesthesia Type: MAC  Patient location during evaluation: PACU  Patient participation: Yes- Able to Participate  Level of consciousness: awake and alert and oriented  Post-procedure vital signs: reviewed and stable  Pain management: adequate  Airway patency: patent  PONV status at discharge: No PONV  Anesthetic complications: no      Cardiovascular status: hemodynamically stable  Respiratory status: unassisted, spontaneous ventilation and room air  Hydration status: euvolemic  Follow-up not needed.        Visit Vitals  /62 (BP Location: Left arm, Patient Position: Lying, BP Method: Automatic)   Pulse 90   Temp 36.6 °C (97.9 °F) (Skin)   Resp 18   Ht 5' 1" (1.549 m)   Wt 97.5 kg (215 lb)   LMP 07/17/2017   SpO2 100%   Breastfeeding? No   BMI 40.62 kg/m²       Pain/Cira Score: Pain Assessment Performed: Yes (7/26/2017  9:56 AM)  Presence of Pain: denies (7/26/2017  9:56 AM)  Cira Score: 9 (7/26/2017  9:57 AM)      "

## 2017-07-26 NOTE — BRIEF OP NOTE
[facility]  Brief Operative Note     SUMMARY     Surgery Date: 7/26/2017     Surgeon(s) and Role:     * Lm Obrien DPM - Primary    Assisting Surgeon: None    Pre-op Diagnosis:  Foot joint instability, left [M25.375]  Hallux abducto valgus, left [M20.12]  Hammer toe of left foot [M20.42]    Post-op Diagnosis:  Post-Op Diagnosis Codes:     * Foot joint instability, left [M25.375]     * Hallux abducto valgus, left [M20.12]     * Hammer toe of left foot [M20.42]    Procedure(s) (LRB):  Left Travis BUNIONECTOMY (44372) (Left)  Left 5th toe PIPJ ARTHROPLASTY (29977) (Left)  ORIF Subtalar joint with Hyprocure (51786) (Left)    Anesthesia: Local MAC    Description of the findings of the procedure: as above    Findings/Key Components: as above    Estimated Blood Loss: <1cc         Specimens:   Specimen (12h ago through future)    None          Discharge Note    SUMMARY     Admit Date: 7/26/2017    Discharge Date and Time: [unfilled] 9:56 AM    Hospital Course (synopsis of major diagnoses, care, treatment, and services provided during the course of the hospital stay): Patient tolerated the procedure and anesthesia well.  Patient was discharged to home care.    Final Diagnosis: Post-Op Diagnosis Codes:     * Foot joint instability, left [M25.375]     * Hallux abducto valgus, left [M20.12]     * Hammer toe of left foot [M20.42]    Disposition: Home or Self Care    Follow Up/Patient Instructions:     Medications:     Medication List      START taking these medications    oxycodone-acetaminophen 7.5-325 mg per tablet  Commonly known as:  PERCOCET  Take 2 tablets by mouth every 6 (six) hours as needed for Pain.        CHANGE how you take these medications    lamotrigine 200 MG tablet  Commonly known as:  LAMICTAL  One twice daily  What changed:  · how much to take  · how to take this  · when to take this  · additional instructions        CONTINUE taking these medications    CLINDAGEL 1 % gel  Generic  drug:  clindamycin phosphate 1%  Thin film to scalp bid     * clobetasol 0.05 % shampoo  Commonly known as:  CLOBEX  Wash scalp and soak 15 min prior to rinsing, repeat 3x/week     * clobetasol 0.05 % external solution  Commonly known as:  TEMOVATE  Use on scalp one - two times daily as needed     ethosuximide 250 mg Cap  Commonly known as:  ZARONTIN     norgestimate-ethinyl estradiol 0.18/0.215/0.25 mg-25 mcg tablet  Commonly known as:  ORTHO TRI-CYCLEN LO  Take 1 tablet by mouth once daily.     spironolactone 50 MG tablet  Commonly known as:  ALDACTONE  Start with 1 po qd, increase to 2 po qd as tolerated     tazarotene 0.05 % Crea cream  Commonly known as:  TAZORAC  Thin film to face qhs, take night off if irritation develops        * This list has 2 medication(s) that are the same as other medications prescribed for you. Read the directions carefully, and ask your doctor or other care provider to review them with you.               Where to Get Your Medications      These medications were sent to [facility]/pharmacy #[id number] [facility] - [address] [address]  [address] [place] [address]    Phone:  [phone]   · oxycodone-acetaminophen 7.5-325 mg per tablet         Discharge Procedure Orders  Diet general     Keep surgical extremity elevated     Ice to affected area     Leave dressing on - Keep it clean, dry, and intact until clinic visit

## 2017-07-26 NOTE — ANESTHESIA PREPROCEDURE EVALUATION
07/26/2017  Terra Hamilton is a 21 y.o., female.    Anesthesia Evaluation    I have reviewed the Patient Summary Reports.    I have reviewed the Nursing Notes.   I have reviewed the Medications.     Review of Systems  Anesthesia Hx:  No problems with previous Anesthesia    Social:  Non-Smoker, Alcohol Use    Cardiovascular:  Cardiovascular Normal Exercise tolerance: good     Pulmonary:  Pulmonary Normal    Hepatic/GI:  Hepatic/GI Normal    Neurological:   Seizures, well controlled    Endocrine:   PCOS   Psych:   Speech delay         Physical Exam  General:  Morbid Obesity    Airway/Jaw/Neck:  Airway Findings: Mouth Opening: Normal Mallampati: III  Improves to II with phonation.  TM Distance: 4 - 6 cm  Jaw/Neck Findings:  Neck ROM: Normal ROM       Chest/Lungs:  Chest/Lungs Findings: Clear to auscultation, Normal Respiratory Rate     Heart/Vascular:  Heart Findings: Rate: Normal  Rhythm: Regular Rhythm  Sounds: Normal        Mental Status:  Mental Status Findings:  Cooperative, Alert and Oriented         Anesthesia Plan  Type of Anesthesia, risks & benefits discussed:  Anesthesia Type:  MAC  Patient's Preference:   Intra-op Monitoring Plan: standard ASA monitors  Intra-op Monitoring Plan Comments:   Post Op Pain Control Plan: multimodal analgesia  Post Op Pain Control Plan Comments:   Induction:   IV  Beta Blocker:  Patient is not currently on a Beta-Blocker (No further documentation required).       Informed Consent: Patient understands risks and agrees with Anesthesia plan.  Questions answered. Anesthesia consent signed with patient.  ASA Score: 3     Day of Surgery Review of History & Physical: I have interviewed and examined the patient. I have reviewed the patient's H&P dated:  There are no significant changes.          Ready For Surgery From Anesthesia Perspective.

## 2017-07-26 NOTE — TRANSFER OF CARE
"Anesthesia Transfer of Care Note    Patient: Terra Hamilton    Procedure(s) Performed: Procedure(s) (LRB):  Left Travis BUNIONECTOMY ([id number]) (Left)  Left 5th toe PIPJ ARTHROPLASTY (78154) (Left)  ORIF Subtalar joint with Hyprocure (99359) (Left)    Patient location: PACU    Anesthesia Type: MAC    Transport from OR: Transported from OR on room air with adequate spontaneous ventilation    Post pain: adequate analgesia    Post assessment: no apparent anesthetic complications and tolerated procedure well    Post vital signs: stable    Level of consciousness: awake    Nausea/Vomiting: no nausea/vomiting    Complications: none    Transfer of care protocol was followed      Last vitals:   Visit Vitals  /62 (BP Location: Left arm, Patient Position: Lying, BP Method: Automatic)   Pulse 90   Temp 36.6 °C (97.9 °F) (Skin)   Resp 18   Ht 5' 1" (1.549 m)   Wt 97.5 kg (215 lb)   LMP 07/17/2017   SpO2 100%   Breastfeeding? No   BMI 40.62 kg/m²     "

## 2017-07-26 NOTE — DISCHARGE INSTRUCTIONS
Discharge Instructions: After Your Surgery  Youve just had surgery. During surgery you were given medicine called anesthesia to keep you relaxed and free of pain. After surgery you may have some pain or nausea. This is common. Here are some tips for feeling better and getting well after surgery.     Stay on schedule with your medication.   Going home  Your doctor or nurse will show you how to take care of yourself when you go home. He or she will also answer your questions. Have an adult family member or friend drive you home. For the first 24 hours after your surgery:  · Do not drive or use heavy equipment.  · Do not make important decisions or sign legal papers.  · Do not drink alcohol.  · Have someone stay with you, if needed. He or she can watch for problems and help keep you safe.  Be sure to go to all follow-up visits with your doctor. And rest after your surgery for as long as your doctor tells you to.  Coping with pain  If you have pain after surgery, pain medicine will help you feel better. Take it as told, before pain becomes severe. Also, ask your doctor or pharmacist about other ways to control pain. This might be with heat, ice, or relaxation. And follow any other instructions your surgeon or nurse gives you.  Tips for taking pain medicine  To get the best relief possible, remember these points:  · Pain medicines can upset your stomach. Taking them with a little food may help.  · Most pain relievers taken by mouth need at least 20 to 30 minutes to start to work.  · Taking medicine on a schedule can help you remember to take it. Try to time your medicine so that you can take it before starting an activity. This might be before you get dressed, go for a walk, or sit down for dinner.  · Constipation is a common side effect of pain medicines. Call your doctor before taking any medicines such as laxatives or stool softeners to help ease constipation. Also ask if you should skip any foods. Drinking lots of  fluids and eating foods such as fruits and vegetables that are high in fiber can also help. Remember, do not take laxatives unless your surgeon has prescribed them.  · Drinking alcohol and taking pain medicine can cause dizziness and slow your breathing. It can even be deadly. Do not drink alcohol while taking pain medicine.  · Pain medicine can make you react more slowly to things. Do not drive or run machinery while taking pain medicine.  Your health care provider may tell you to take acetaminophen to help ease your pain. Ask him or her how much you are supposed to take each day. Acetaminophen or other pain relievers may interact with your prescription medicines or other over-the-counter (OTC) drugs. Some prescription medicines have acetaminophen and other ingredients. Using both prescription and OTC acetaminophen for pain can cause you to overdose. Read the labels on your OTC medicines with care. This will help you to clearly know the list of ingredients, how much to take, and any warnings. It may also help you not take too much acetaminophen. If you have questions or do not understand the information, ask your pharmacist or health care provider to explain it to you before you take the OTC medicine.  Managing nausea  Some people have an upset stomach after surgery. This is often because of anesthesia, pain, or pain medicine, or the stress of surgery. These tips will help you handle nausea and eat healthy foods as you get better. If you were on a special food plan before surgery, ask your doctor if you should follow it while you get better. These tips may help:  · Do not push yourself to eat. Your body will tell you when to eat and how much.  · Start off with clear liquids and soup. They are easier to digest.  · Next try semi-solid foods, such as mashed potatoes, applesauce, and gelatin, as you feel ready.  · Slowly move to solid foods. Dont eat fatty, rich, or spicy foods at first.  · Do not force yourself to  have 3 large meals a day. Instead eat smaller amounts more often.  · Take pain medicines with a small amount of solid food, such as crackers or toast, to avoid nausea.     Call your surgeon if  · You still have pain an hour after taking medicine. The medicine may not be strong enough.  · You feel too sleepy, dizzy, or groggy. The medicine may be too strong.  · You have side effects like nausea, vomiting, or skin changes, such as rash, itching, or hives.       If you have obstructive sleep apnea  You were given anesthesia medicine during surgery to keep you comfortable and free of pain. After surgery, you may have more apnea spells because of this medicine and other medicines you were given. The spells may last longer than usual.   At home:  · Keep using the continuous positive airway pressure (CPAP) device when you sleep. Unless your health care provider tells you not to, use it when you sleep, day or night. CPAP is a common device used to treat obstructive sleep apnea.  · Talk with your provider before taking any pain medicine, muscle relaxants, or sedatives. Your provider will tell you about the possible dangers of taking these medicines.  Date Last Reviewed: 10/16/2014  © 4426-1956 [employer]. [address] [place] [address], [place] [address]. All rights reserved. This information is not intended as a substitute for professional medical care. Always follow your healthcare professional's instructions.        FOOT SURGERY  After surgery:    DOS:   Keep leg elevated until first post operative visit   Keep ice pack on the foot for 48-72 hours   Ice on for 20 minutes of each hour while awake   Keep dressing clean and dry DO NOT CHANGE BANDAGES   Advanced diet as tolerated.    Check circulation frequently in toes by pressing down on toenail. Nail should turn white and then pink when released.   May walk on foot to go to the bathroom only DO NOT WALK WITHOUT SHOE. HEEL ONLY   Wear surgical shoe. May  remove shoe to sleep.   Resume home medications    DONT:   Do not remove your dressing   Do not get dressing wet.   No driving until released by MD   DO NOT TAKE ADDITIONAL TYLENOL/ACETAMINOPHEN WHILE TAKING NARCOTIC PAIN MEDICATION THAT CONTAINS TYLENOL/ACETAMINOPHEN.    CALL PHYSICIAN FOR:   Pain, burning, or numbness of the toes not relieved by elevation of the leg.   Pale or cold toes; bluish nail beds.   Redness, swelling, or bleeding.   Fever> 101   Drainage (pus) from the puncture sites   Pain unrelieved by pain medication    FOR EMERGENCIES CONTACT YOUR PHYSICIAN [phone]    Exparel(bupivacaine) has been injected to provide approximately 96 hours of reduced pain after your surgery.  Do not remove the bracelet for five days  Report to your doctor as soon as possible the following:   Restlessness   Anxiety   Speech problems,    Lightheadedness   Numbness and tingling of the mouth and lips   Seizures    Metallic taste   Blurred vision   Tremors    Twitching   Depression   Extreme drowsiness  Avoid procedures requiring local anesthetics (such as dental procedures) for five days

## 2017-07-27 VITALS
OXYGEN SATURATION: 100 % | HEIGHT: 61 IN | TEMPERATURE: 98 F | RESPIRATION RATE: 18 BRPM | SYSTOLIC BLOOD PRESSURE: 120 MMHG | DIASTOLIC BLOOD PRESSURE: 78 MMHG | HEART RATE: 80 BPM | WEIGHT: 215 LBS | BODY MASS INDEX: 40.59 KG/M2

## 2017-07-31 ENCOUNTER — OFFICE VISIT (OUTPATIENT)
Dept: PODIATRY | Facility: CLINIC | Age: 22
End: 2017-07-31
Payer: COMMERCIAL

## 2017-07-31 DIAGNOSIS — Z09 FOLLOW-UP EXAMINATION FOLLOWING SURGERY: Primary | ICD-10-CM

## 2017-07-31 PROCEDURE — 99999 PR PBB SHADOW E&M-EST. PATIENT-LVL II: CPT | Mod: PBBFAC,,,

## 2017-07-31 PROCEDURE — 99024 POSTOP FOLLOW-UP VISIT: CPT | Mod: S$GLB,,,

## 2017-07-31 NOTE — PROGRESS NOTES
Chief Complaint   Patient presents with    Post-op Evaluation     1st post op Lt foot DOS 7/28        Patient returns status post left Travis bunionectomy, left 5th toe arthroplasty, left ORIF subtalar joint x 5  days.  Patient states doing well.  Patient's pain is well controlled with the pain medications.  Patient denies any nausea vomiting fever or chills or calf pain.  Patient has been non  weightbearing with kneeroller.  Patient's  dressing is clean dry and intact.    PE:    Incision site looks well coapted.  The sutures are intact.  There is minimal swelling and no drainage.  There is no hematoma.  There is good capillary fill time to all digits.  No neurovascular compromise.    Assessment:  Doing well postoperatively.        Plan:   1. Continue any current medications.  2. Wound was redressed under sterile conditions.  3. Patient to continue heel weightbearing with postoperative shoe.  4.  Return to clinic 7 days for suture removal.

## 2017-08-07 ENCOUNTER — OFFICE VISIT (OUTPATIENT)
Dept: PODIATRY | Facility: CLINIC | Age: 22
End: 2017-08-07
Payer: COMMERCIAL

## 2017-08-07 DIAGNOSIS — Z09 FOLLOW-UP EXAMINATION FOLLOWING SURGERY: Primary | ICD-10-CM

## 2017-08-07 PROCEDURE — 99999 PR PBB SHADOW E&M-EST. PATIENT-LVL II: CPT | Mod: PBBFAC,,,

## 2017-08-07 PROCEDURE — 99024 POSTOP FOLLOW-UP VISIT: CPT | Mod: S$GLB,,,

## 2017-08-07 NOTE — PROGRESS NOTES
Chief Complaint   Patient presents with    Post-op Evaluation     2nd post op Lt foot DOS 7/26       Patient returns status post left Travis bunionectomy, left 5th toe arthroplasty, left ORIF subtalar joint x 12  days.  Patient states doing well.  Patient's pain is well controlled with the pain medications.  Patient denies any nausea vomiting fever or chills or calf pain.  Patient has been non weightbearing with knee roller.  Patient's  dressing is clean dry and intact.  She is going back to [employer]    PE:    Incision site looks well coapted.  The sutures are intact.  There is minimal swelling and no drainage.  There is no hematoma.  There is good capillary fill time to all digits.  No neurovascular compromise. No pain with inversion, hallux and 5th toe in good position.    Assessment:  Doing well postoperatively.        Plan:   1. Continue any current medications.  2.  Sutures removed from 5th toe, splinted great in rectus position, patient to continue to do this at home with coban, NWB x 2 weeks, x-ray at that time.  She will see me in 1 month, x-ray to be done in [place] and if ok in 2 weeks, protected WB.  She is going back to [employer] this week.

## 2017-08-17 ENCOUNTER — PATIENT MESSAGE (OUTPATIENT)
Dept: PODIATRY | Facility: CLINIC | Age: 22
End: 2017-08-17

## 2017-08-17 ENCOUNTER — TELEPHONE (OUTPATIENT)
Dept: PODIATRY | Facility: CLINIC | Age: 22
End: 2017-08-17

## 2017-08-17 NOTE — TELEPHONE ENCOUNTER
----- Message from Nicole Velasquez sent at 8/17/2017 12:14 PM CDT -----  Contact: Mariajose (mother)  Mariajose (mother) calling in regards to the patient being non weight bearing. She starts at [employer] on Monday and she has fell twice already. Mom is requesting a prescription for a motorized scooter from [employer], , ph , fax , attention to [place].  Mom is requesting a letter of medical necessity and the reason why the patient needs the motorized scooter. Please advise.   Call back   Thanks!

## 2017-08-18 ENCOUNTER — TELEPHONE (OUTPATIENT)
Dept: PODIATRY | Facility: CLINIC | Age: 22
End: 2017-08-18

## 2017-08-18 ENCOUNTER — HOSPITAL ENCOUNTER (OUTPATIENT)
Dept: RADIOLOGY | Facility: HOSPITAL | Age: 22
Discharge: HOME | End: 2017-08-18
Payer: COMMERCIAL

## 2017-08-18 DIAGNOSIS — Z09 FOLLOW-UP EXAMINATION FOLLOWING SURGERY: ICD-10-CM

## 2017-08-18 PROCEDURE — 73630 X-RAY EXAM OF FOOT: CPT | Mod: TC,PO,LT

## 2017-08-18 PROCEDURE — 73630 X-RAY EXAM OF FOOT: CPT | Mod: 26,LT,, | Performed by: RADIOLOGY

## 2017-08-18 NOTE — TELEPHONE ENCOUNTER
----- Message from Nery Dior sent at 8/17/2017  4:37 PM CDT -----  Contact: Mom Mariajose Hamilton [phone]  Call placed to pod. Patient's mom returned your call, please call back.  Thank you!

## 2017-08-18 NOTE — TELEPHONE ENCOUNTER
Patient's mother stated that the patient has a knee roller, but that the terrain is so bad on campus that the patient fell two times yesterday - with the knee roller.  Asking for something motorized to use so that the patient does not have to stop school for this semester.  Please advise.

## 2017-08-18 NOTE — TELEPHONE ENCOUNTER
----- Message from Lisa Salinas sent at 8/18/2017  1:07 PM CDT -----  requesting chart notes for motorized scooter/ trying to get approved through insurance for Monday   / mobility molly / [place] [phone] fax [phone]

## 2017-09-06 ENCOUNTER — OFFICE VISIT (OUTPATIENT)
Dept: PODIATRY | Facility: CLINIC | Age: 22
End: 2017-09-06
Payer: COMMERCIAL

## 2017-09-06 VITALS — HEIGHT: 61 IN | WEIGHT: 214.94 LBS | BODY MASS INDEX: 40.58 KG/M2

## 2017-09-06 DIAGNOSIS — Z09 FOLLOW-UP EXAMINATION FOLLOWING SURGERY: Primary | ICD-10-CM

## 2017-09-06 PROCEDURE — 99024 POSTOP FOLLOW-UP VISIT: CPT | Mod: S$GLB,,,

## 2017-09-06 PROCEDURE — 99999 PR PBB SHADOW E&M-EST. PATIENT-LVL II: CPT | Mod: PBBFAC,,,

## 2017-09-06 NOTE — PROGRESS NOTES
Chief Complaint   Patient presents with    Follow-up     4 week post op Lt foot DOS 7/26       Patient returns status post left Travis bunionectomy, left 5th toe arthroplasty, left ORIF subtalar joint x 6 weeks.  Patient states doing well.  Patient's pain is well controlled with the pain medications.  Patient denies any nausea vomiting fever or chills or calf pain.  Patient has been non weightbearing with knee roller.  Patient's  dressing is clean dry and intact.  She is back at school.    PE:    Incision site looks well coapted.  There is minimal swelling and no drainage.  There is no hematoma.  There is good capillary fill time to all digits.  No neurovascular compromise. No pain with inversion, hallux and 5th toe in good position, limited rom of motion first mpj due to scarring    Assessment:  Doing well postoperatively.        Plan:   Advance to full WB today with short leg cast boot, PT referral, toe spacer,  rom exercises, rtc next year to consider surgery other foot.

## 2017-09-14 ENCOUNTER — PATIENT MESSAGE (OUTPATIENT)
Dept: PODIATRY | Facility: CLINIC | Age: 22
End: 2017-09-14

## 2017-09-15 NOTE — TELEPHONE ENCOUNTER
Spoke with patient and let her know that she was ok to continue using the boot. Patient stated verbal understanding.

## 2017-09-15 NOTE — TELEPHONE ENCOUNTER
Spoke with patient and let he know that she can continue to use the boot without the air bags on the side . Patient stated verbal understanding.

## 2017-09-18 ENCOUNTER — TELEPHONE (OUTPATIENT)
Dept: PODIATRY | Facility: CLINIC | Age: 22
End: 2017-09-18

## 2017-09-18 NOTE — TELEPHONE ENCOUNTER
Let Elaina know that I will have Dr. Obrien fill out the plan of care as soon as he gets back into the office later this week.

## 2017-09-18 NOTE — TELEPHONE ENCOUNTER
----- Message from Lisa Salinas sent at 9/18/2017  1:46 PM CDT -----  Please call  [facility]/ [facility]  [phone] fax # [phone]  ..requested plan of care and operative report request .. Has faxed but not going through ?

## 2017-09-21 ENCOUNTER — TELEPHONE (OUTPATIENT)
Dept: PODIATRY | Facility: CLINIC | Age: 22
End: 2017-09-21

## 2017-09-21 NOTE — TELEPHONE ENCOUNTER
----- Message from Song Mcguire sent at 9/21/2017  7:41 AM CDT -----  Contact: [facility] physical therapy- Elaina [phone]  The office caller states she was not able to send process notes for patient via email address.  Thanks!

## 2017-10-20 ENCOUNTER — OFFICE VISIT (OUTPATIENT)
Dept: PODIATRY | Facility: CLINIC | Age: 22
End: 2017-10-20
Payer: COMMERCIAL

## 2017-10-20 VITALS — WEIGHT: 214 LBS | HEIGHT: 61 IN | BODY MASS INDEX: 40.4 KG/M2

## 2017-10-20 DIAGNOSIS — M20.11 HALLUX VALGUS OF RIGHT FOOT: Primary | ICD-10-CM

## 2017-10-20 DIAGNOSIS — Z09 FOLLOW-UP EXAMINATION FOLLOWING SURGERY: ICD-10-CM

## 2017-10-20 DIAGNOSIS — Q66.6 PES VALGUS: ICD-10-CM

## 2017-10-20 PROCEDURE — 99024 POSTOP FOLLOW-UP VISIT: CPT | Mod: S$GLB,,,

## 2017-10-20 PROCEDURE — 99999 PR PBB SHADOW E&M-EST. PATIENT-LVL II: CPT | Mod: PBBFAC,,,

## 2017-10-20 NOTE — PROGRESS NOTES
Chief Complaint   Patient presents with    Follow-up       Patient returns status post left Travis bunionectomy, left 5th toe arthroplasty, left ORIF subtalar joint x 6 months.  Patient states doing well.  Patient's pain is well controlled with the pain medications.  Patient denies any nausea vomiting fever or chills or calf pain.  Patient has been full WB in tennis shoes without pain.    PE:    Constitutional:  Patient is oriented to person, place, and time. Vital signs are normal.  Appears well-developed and well-nourished.     Vascular:  Dorsalis pedis pulses are 2+ on the right side, and 2+ on the left side.   Posterior tibial pulses are 2+ on the right side, and 2+ on the left side.   + digital hair growth, capillary fill time to all toes <3 seconds, no swelling    Skin/Dermatological:  Skin is warm and intact.  No cyanosis or clubbing.  No rashes noted.  No open wounds.      Musculoskeletal:      Left first MPJ has slight restricted range of motion but is nontender and in good position. There is a moderate nonreducible hallux abductovalgus on the right. Tenderness medial eminence and plantarly. Pedal joint range of motion otherwise within normal as a moderate flat foot on the right. It is flexible       Neurological:  No deficits to sharp/dull, light touch or vibratory sensation.   Muscle strength to tibialis anterior, extensor hallucis longus, extensor digitorum longus, peroneal muscles, flexor hallucis/digotorum longus, posterior tibial and gastrosoleal complex is 5/5, normal tone without assymmetry   Patellar reflexes are 2+ on the right side and 2+ on the left side.  Achilles reflexes are 2+ on the right side and 2+ on the left side.      Assessment:  Healed left foot bunionectomy  Right hallux abducto valgus  Pes valgus right       Plan:     Left foot healed, doing well, no restrictions.    Plan right bunionectomy and hyprocure arthroeresis procedure for flat foot 2nd week May 2018. Local MAC.  6 weeks  heel WB in boot.  The risks, benefits, complications, treatment options, and expected outcomes were discussed with the patient who verbalized understanding.

## 2017-12-07 ENCOUNTER — PATIENT MESSAGE (OUTPATIENT)
Dept: DERMATOLOGY | Facility: CLINIC | Age: 22
End: 2017-12-07

## 2017-12-14 ENCOUNTER — OFFICE VISIT (OUTPATIENT)
Dept: OBSTETRICS AND GYNECOLOGY | Facility: CLINIC | Age: 22
End: 2017-12-14
Payer: COMMERCIAL

## 2017-12-14 VITALS
BODY MASS INDEX: 40.46 KG/M2 | WEIGHT: 214.31 LBS | HEIGHT: 61 IN | DIASTOLIC BLOOD PRESSURE: 82 MMHG | SYSTOLIC BLOOD PRESSURE: 122 MMHG

## 2017-12-14 DIAGNOSIS — Z11.51 ENCOUNTER FOR SCREENING FOR HUMAN PAPILLOMAVIRUS (HPV): ICD-10-CM

## 2017-12-14 DIAGNOSIS — Z01.419 ENCOUNTER FOR GYNECOLOGICAL EXAMINATION WITHOUT ABNORMAL FINDING: Primary | ICD-10-CM

## 2017-12-14 DIAGNOSIS — Z79.899 ENCOUNTER FOR LONG-TERM (CURRENT) USE OF HIGH-RISK MEDICATION: ICD-10-CM

## 2017-12-14 PROCEDURE — 99385 PREV VISIT NEW AGE 18-39: CPT | Mod: S$GLB,,, | Performed by: OBSTETRICS & GYNECOLOGY

## 2017-12-14 PROCEDURE — 87624 HPV HI-RISK TYP POOLED RSLT: CPT

## 2017-12-14 PROCEDURE — 99999 PR PBB SHADOW E&M-EST. PATIENT-LVL III: CPT | Mod: PBBFAC,,, | Performed by: OBSTETRICS & GYNECOLOGY

## 2017-12-14 PROCEDURE — 88175 CYTOPATH C/V AUTO FLUID REDO: CPT

## 2017-12-14 RX ORDER — LUBIPROSTONE 24 UG/1
24 CAPSULE ORAL 2 TIMES DAILY
Qty: 60 CAPSULE | Refills: 3 | Status: SHIPPED | OUTPATIENT
Start: 2017-12-14 | End: 2018-04-06 | Stop reason: SDUPTHER

## 2017-12-14 RX ORDER — NORGESTIMATE AND ETHINYL ESTRADIOL 7DAYSX3 LO
1 KIT ORAL DAILY
Qty: 28 TABLET | Refills: 12 | Status: SHIPPED | OUTPATIENT
Start: 2017-12-14 | End: 2018-11-21 | Stop reason: SDUPTHER

## 2017-12-14 NOTE — PROGRESS NOTES
Chief Complaint   Patient presents with    Gynecologic Exam       History and Physical:  Patient's last menstrual period was 11/28/2017.       Terra Hamilton is a 22 y.o.  female who presents today for her routine annual GYN exam. The patient has no Gynecology complaints today. Sexually active x 2 months, counse;ed. Due for PAP, also admits to very small BMs daily, counseled, has seen GI. No bladder complaints, request oral contraceptive pills  For BC and acne- counseled.       Allergies: Review of patient's allergies indicates:  No Known Allergies    Past Medical History:   Diagnosis Date    Focal seizures     History of polycystic ovarian disease     Seizures     2013    Speech delay        Past Surgical History:   Procedure Laterality Date    FOOT SURGERY      TONSILLECTOMY, ADENOIDECTOMY, BILATERAL MYRINGOTOMY AND TUBES      WISDOM TOOTH EXTRACTION         MEDS:   Current Outpatient Prescriptions on File Prior to Visit   Medication Sig Dispense Refill    ethosuximide (ZARONTIN) 250 mg Cap 250 mg 2 (two) times daily.       lamotrigine (LAMICTAL) 200 MG tablet One twice daily (Patient taking differently: Take 200 mg by mouth 2 (two) times daily. One twice daily) 60 tablet 0    tazarotene (TAZORAC) 0.05 % Crea cream Thin film to face qhs, take night off if irritation develops 60 g 3    [DISCONTINUED] CLINDAGEL 1 % gel Thin film to scalp bid 1 Bottle 3    [DISCONTINUED] clobetasol (CLOBEX) 0.05 % shampoo Wash scalp and soak 15 min prior to rinsing, repeat 3x/week 118 mL 6    [DISCONTINUED] clobetasol (TEMOVATE) 0.05 % external solution Use on scalp one - two times daily as needed 60 mL 3    [DISCONTINUED] norgestimate-ethinyl estradiol (ORTHO TRI-CYCLEN LO) 0.18/0.215/0.25 mg-25 mcg tablet Take 1 tablet by mouth once daily. 30 tablet 5    [DISCONTINUED] oxycodone-acetaminophen (PERCOCET) 7.5-325 mg per tablet Take 2 tablets by mouth every 6 (six) hours as needed for Pain. 50 tablet 0  "   [DISCONTINUED] spironolactone (ALDACTONE) 50 MG tablet Start with 1 po qd, increase to 2 po qd as tolerated 60 tablet 3     No current facility-administered medications on file prior to visit.        OB History      Para Term  AB Living    0 0 0 0 0 0    SAB TAB Ectopic Multiple Live Births    0 0 0 0 0          Social History     Social History    Marital status: Single     Spouse name: N/A    Number of children: N/A    Years of education: N/A     Occupational History    Not on file.     Social History Main Topics    Smoking status: Never Smoker    Smokeless tobacco: Never Used    Alcohol use Yes      Comment: only on special occasions    Drug use: No    Sexual activity: Yes     Partners: Male     Birth control/ protection: Condom     Other Topics Concern    Not on file     Social History Narrative    Lives with Mom, Dad.Sister is in college.1 dog, 1 cat, 1 rabbitIn 10th grade. School is "ok".       Family History   Problem Relation Age of Onset    Hypertension Mother     Heart disease Mother     Arthritis Mother     Cholelithiasis Mother     Hypertension Father     Asthma Sister     Endometriosis Sister     Anemia Sister     Hypertension Maternal Grandmother     Heart disease Maternal Grandfather     Breast cancer Paternal Grandmother     Myasthenia gravis Paternal Grandmother     Myasthenia gravis Paternal Grandfather     Diabetes Paternal Grandfather     Irritable bowel syndrome Maternal Aunt     Ulcerative colitis Maternal Uncle          Past medical and surgical history reviewed.   I have reviewed the patient's medical history in detail and updated the computerized patient record.        Review of System:   General: no chills, fever, night sweats, weight gain or weight loss  Psychological: no depression or suicidal ideation  Breasts: no new or changing breast lumps, nipple discharge or masses.  Respiratory: no cough, shortness of breath, or wheezing  Cardiovascular: " "no chest pain or dyspnea on exertion  Gastrointestinal: no abdominal pain, change in bowel habits, or black or bloody stools  Genito-Urinary: no incontinence, urinary frequency/urgency or vulvar/vaginal symptoms, pelvic pain or abnormal vaginal bleeding.  Musculoskeletal: no gait disturbance or muscular weakness      Physical Exam:   /82   Ht 5' 1" (1.549 m)   Wt 97.2 kg (214 lb 4.6 oz)   LMP 11/28/2017   BMI 40.49 kg/m²   Constitutional: She is oriented to person, place, and time. She appears well-developed and well-nourished. No distress.   HENT:   Head: Normocephalic and atraumatic.   Eyes: Conjunctivae and EOM are normal. No scleral icterus.   Neck: Normal range of motion. Neck supple. No tracheal deviation present.   Cardiovascular: Normal rate.    Pulmonary/Chest: Effort normal. No respiratory distress. She exhibits no tenderness.  Breasts: are symmetrical.   Right breast exhibits no inverted nipple, no mass, no nipple discharge, no skin change and no tenderness.   Left breast exhibits no inverted nipple, no mass, no nipple discharge, no skin change and no tenderness.  Abdominal: Soft. She exhibits no distension and no mass. There is no tenderness. There is no rebound and no guarding.   Genitourinary:    External rectal exam shows no thrombosed external hemorrhoids.    Pelvic exam was performed with patient supine.   No labial fusion.   There is no rash, lesion or injury on the right labia.   There is no rash, lesion or injury on the left labia.   No bleeding and no signs of injury around the vaginal introitus, urethra is without lesions and well supported. The cervix is visualized with no discharge, lesions or friability.   No vaginal discharge found.    No significant Cystocele, Enterocele or rectocele, and uterus well supported.   Bimanual exam:   The urethra is normal to palpation and there are no palpable vaginal wall masses.   Uterus is not deviated, not enlarged, not fixed, normal shape and " not tender.   Cervix exhibits no motion tenderness. Rectal vault full of stool   Right adnexum displays no mass and no tenderness.   Left adnexum displays no mass and no tenderness.  Musculoskeletal: Normal range of motion.   Lymphadenopathy: No inguinal adenopathy present.   Neurological: She is alert and oriented to person, place, and time. Coordination normal.   Skin: Skin is warm and dry. She is not diaphoretic.   Psychiatric: She has a normal mood and affect.      Assessment:   Normal annual GYN exam  1. Encounter for gynecological examination without abnormal finding  Liquid-based pap smear, screening   2. Encounter for screening for human papillomavirus (HPV)  HPV High Risk Genotypes, PCR   3. Encounter for long-term (current) use of high-risk medication  norgestimate-ethinyl estradiol (ORTHO TRI-CYCLEN LO) 0.18/0.215/0.25 mg-25 mcg tablet       Plan:   PAP  amitiza  oral contraceptive pills   Follow up in 2 months pending response to meds. .  Patient informed will be contacted with results within 2 weeks. Encouraged to please call back or email if she has not heard from us by then.

## 2017-12-19 LAB
HPV16 AG SPEC QL: NEGATIVE
HPV16+18+H RISK 12 DNA CVX-IMP: NEGATIVE
HPV18 DNA SPEC QL NAA+PROBE: NEGATIVE

## 2018-04-06 RX ORDER — LUBIPROSTONE 24 UG/1
24 CAPSULE, GELATIN COATED ORAL 2 TIMES DAILY
Qty: 60 CAPSULE | Refills: 9 | Status: SHIPPED | OUTPATIENT
Start: 2018-04-06 | End: 2019-01-17 | Stop reason: SDUPTHER

## 2018-04-12 ENCOUNTER — PATIENT MESSAGE (OUTPATIENT)
Dept: OBSTETRICS AND GYNECOLOGY | Facility: CLINIC | Age: 23
End: 2018-04-12

## 2018-05-07 ENCOUNTER — OFFICE VISIT (OUTPATIENT)
Dept: OBSTETRICS AND GYNECOLOGY | Facility: CLINIC | Age: 23
End: 2018-05-07
Payer: COMMERCIAL

## 2018-05-07 VITALS
BODY MASS INDEX: 43.91 KG/M2 | SYSTOLIC BLOOD PRESSURE: 130 MMHG | HEIGHT: 61 IN | DIASTOLIC BLOOD PRESSURE: 73 MMHG | WEIGHT: 232.56 LBS

## 2018-05-07 DIAGNOSIS — N94.0 MIDCYCLE PAIN: Primary | ICD-10-CM

## 2018-05-07 PROCEDURE — 3008F BODY MASS INDEX DOCD: CPT | Mod: CPTII,S$GLB,, | Performed by: OBSTETRICS & GYNECOLOGY

## 2018-05-07 PROCEDURE — 99999 PR PBB SHADOW E&M-EST. PATIENT-LVL III: CPT | Mod: PBBFAC,,, | Performed by: OBSTETRICS & GYNECOLOGY

## 2018-05-07 PROCEDURE — 99213 OFFICE O/P EST LOW 20 MIN: CPT | Mod: S$GLB,,, | Performed by: OBSTETRICS & GYNECOLOGY

## 2018-05-07 NOTE — PROGRESS NOTES
"No chief complaint on file.      History of Present Illness   22 y.o.  female patient presents today for midcycle spotting x 1 month. No associated pain of other complaints, and not missing any pills. No bowel or bladder complaints.   15 minutes spent with patient with > 1/2 time in counseling.      Past medical and surgical history reviewed.   I have reviewed the patient's medical history in detail and updated the computerized patient record.    Review of patient's allergies indicates:  No Known Allergies      Review of Systems - Negative except HPI  GEN ROS: negative for - chills or fever  Breast ROS: negative for breast lumps  Genito-Urinary ROS: no dysuria, trouble voiding, or hematuria      Physical Examination:  /73   Ht 5' 1" (1.549 m)   Wt 105.5 kg (232 lb 9.4 oz)   LMP 04/20/2018 (Approximate)   BMI 43.95 kg/m²    deferred      Assessment:  Single episode midcycle spotting, counseled.     Plan:  Reassured / counseled.   Patient informed will be contacted with results within 2 weeks. Encouraged to please call back or email if she has not heard from us by then.          "

## 2018-05-10 ENCOUNTER — PATIENT MESSAGE (OUTPATIENT)
Dept: PODIATRY | Facility: CLINIC | Age: 23
End: 2018-05-10

## 2018-05-15 ENCOUNTER — OFFICE VISIT (OUTPATIENT)
Dept: DERMATOLOGY | Facility: CLINIC | Age: 23
End: 2018-05-15
Payer: COMMERCIAL

## 2018-05-15 DIAGNOSIS — L73.0 ACNE KELOIDALIS NUCHAE: Primary | ICD-10-CM

## 2018-05-15 DIAGNOSIS — D22.9 MULTIPLE BENIGN NEVI: ICD-10-CM

## 2018-05-15 DIAGNOSIS — B07.8 COMMON WART: ICD-10-CM

## 2018-05-15 PROCEDURE — 99213 OFFICE O/P EST LOW 20 MIN: CPT | Mod: 25,S$GLB,, | Performed by: DERMATOLOGY

## 2018-05-15 PROCEDURE — 11901 INJECT SKIN LESIONS >7: CPT | Mod: 59,S$GLB,, | Performed by: DERMATOLOGY

## 2018-05-15 PROCEDURE — 17110 DESTRUCTION B9 LES UP TO 14: CPT | Mod: S$GLB,,, | Performed by: DERMATOLOGY

## 2018-05-15 PROCEDURE — 99999 PR PBB SHADOW E&M-EST. PATIENT-LVL II: CPT | Mod: PBBFAC,,, | Performed by: DERMATOLOGY

## 2018-05-15 RX ORDER — TRIAMCINOLONE ACETONIDE 40 MG/ML
40 INJECTION, SUSPENSION INTRA-ARTICULAR; INTRAMUSCULAR
Status: COMPLETED | OUTPATIENT
Start: 2018-05-15 | End: 2018-05-15

## 2018-05-15 RX ADMIN — TRIAMCINOLONE ACETONIDE 40 MG: 40 INJECTION, SUSPENSION INTRA-ARTICULAR; INTRAMUSCULAR at 12:05

## 2018-05-15 NOTE — PROGRESS NOTES
Subjective:       Patient ID:  Terra Hamilton is a 22 y.o. female who presents for   Chief Complaint   Patient presents with    Lesion    Skin Check     Last seen 7-. Here today for skin check c/o lesion on left elbow for a 3 months, treated with  w/ cryo & s/p candida Ag x 3, warts resolved but have returned   Bumps on scalp for 1 month, she feels it's flared more now due to increased sweating, not treating.  Mole on back recently noticed after getting a sunburn, not treated     Acne vulgaris using Tazorac 0.05% cream qhs.  Pt discontinued spironolactone (ALDACTONE) 50 MG tablet due it causing her to become dizzy  Student at  [employer]   No phx of skin cancer                         Review of Systems   Skin: Positive for activity-related sunscreen use and recent sunburn. Negative for rash.        Objective:    Physical Exam   Constitutional: She appears well-developed and well-nourished. No distress.   HENT:   Head:       Mouth/Throat: Lips normal.    Eyes: Lids are normal.  No conjunctival no injection.   Cardiovascular: There is no local extremity swelling and no dependent edema.     Neurological: She is alert and oriented to person, place, and time. She is not disoriented.   Psychiatric: She has a normal mood and affect.   Skin:   Areas Examined (abnormalities noted in diagram):   Scalp / Hair Palpated and Inspected  Head / Face Inspection Performed  Neck Inspection Performed  Chest / Axilla Inspection Performed  Abdomen Inspection Performed  Back Inspection Performed  RUE Inspected  LUE Inspection Performed              Diagram Legend     Erythematous scaling macule/papule c/w actinic keratosis       Vascular papule c/w angioma      Pigmented verrucoid papule/plaque c/w seborrheic keratosis      Yellow umbilicated papule c/w sebaceous hyperplasia      Irregularly shaped tan macule c/w lentigo     1-2 mm smooth white papules consistent with Milia      Movable subcutaneous cyst with punctum c/w  epidermal inclusion cyst      Subcutaneous movable cyst c/w pilar cyst      Firm pink to brown papule c/w dermatofibroma      Pedunculated fleshy papule(s) c/w skin tag(s)      Evenly pigmented macule c/w junctional nevus     Mildly variegated pigmented, slightly irregular-bordered macule c/w mildly atypical nevus      Flesh colored to evenly pigmented papule c/w intradermal nevus       Pink pearly papule/plaque c/w basal cell carcinoma      Erythematous hyperkeratotic cursted plaque c/w SCC      Surgical scar with no sign of skin cancer recurrence      Open and closed comedones      Inflammatory papules and pustules      Verrucoid papule consistent consistent with wart     Erythematous eczematous patches and plaques     Dystrophic onycholytic nail with subungual debris c/w onychomycosis     Umbilicated papule    Erythematous-base heme-crusted tan verrucoid plaque consistent with inflamed seborrheic keratosis     Erythematous Silvery Scaling Plaque c/w Psoriasis     See annotation      Assessment / Plan:        Acne keloidalis nuchae  Declines topicals and oral abx  -     triamcinolone acetonide injection 40 mg; 1 mL (40 mg total) by Other route one time.  Intralesional Kenalog 10mg/cc (3.8 cc total) injected into 12 lesions on the scalp today after obtaining verbal consent including risk of surrounding hypopigmentation. Patient tolerated procedure well.    Units: 4  NDC for Kenalog 10mg/cc:  2965-1545-59            Common wart, left arm  Cryosurgery procedure note:    Verbal consent from the patient is obtained. Liquid nitrogen cryosurgery is applied to 1 verruca with prior paring, as detailed in the physical exam, to produce a freeze injury. 2 consecutive freeze thaw cycles are applied to each verruca. The patient is aware that blisters (possibly blood blisters) may form.    Discussed risk scarring and hypopigmentation and possible need for repeat treatments      Multiple Benign Nevi  Discussed ABCDE's of nevi.   Monitor for new mole or moles that are becoming bigger, darker, irritated, or developing irregular borders.                Follow-up in about 6 weeks (around 6/26/2018).

## 2018-05-16 ENCOUNTER — OFFICE VISIT (OUTPATIENT)
Dept: PODIATRY | Facility: CLINIC | Age: 23
End: 2018-05-16
Payer: COMMERCIAL

## 2018-05-16 ENCOUNTER — TELEPHONE (OUTPATIENT)
Dept: PODIATRY | Facility: CLINIC | Age: 23
End: 2018-05-16

## 2018-05-16 ENCOUNTER — HOSPITAL ENCOUNTER (OUTPATIENT)
Dept: RADIOLOGY | Facility: OTHER | Age: 23
Discharge: HOME | End: 2018-05-16
Payer: COMMERCIAL

## 2018-05-16 VITALS — BODY MASS INDEX: 43.8 KG/M2 | WEIGHT: 232 LBS | HEIGHT: 61 IN

## 2018-05-16 DIAGNOSIS — M20.41 HAMMER TOE OF RIGHT FOOT: ICD-10-CM

## 2018-05-16 DIAGNOSIS — M20.11 HALLUX VALGUS OF RIGHT FOOT: Primary | ICD-10-CM

## 2018-05-16 DIAGNOSIS — M25.374 INSTABILITY OF RIGHT FOOT JOINT: ICD-10-CM

## 2018-05-16 DIAGNOSIS — M20.11 HALLUX VALGUS OF RIGHT FOOT: ICD-10-CM

## 2018-05-16 DIAGNOSIS — Q66.6 PES VALGUS: ICD-10-CM

## 2018-05-16 PROCEDURE — 99214 OFFICE O/P EST MOD 30 MIN: CPT | Mod: S$GLB,,,

## 2018-05-16 PROCEDURE — 73630 X-RAY EXAM OF FOOT: CPT | Mod: 26,50,, | Performed by: RADIOLOGY

## 2018-05-16 PROCEDURE — 3008F BODY MASS INDEX DOCD: CPT | Mod: CPTII,S$GLB,,

## 2018-05-16 PROCEDURE — 73630 X-RAY EXAM OF FOOT: CPT | Mod: 50,TC,FY

## 2018-05-16 NOTE — PROGRESS NOTES
Subjective:       Patient ID: Terra Hamilton is a 22 y.o. female.    Chief Complaint: Follow-up (Surgery Consult)    HPI  Patient returns to the clinic to discuss surgical intervention for her right flatfoot deformity, 5th hammertoe and bunion.  She underwent left foot surgery on 7/26/17 and she would like to pursue the same procedure she had on the left because she is happy with her result.  She has failed conservative therapy including arch supports wide shoes padding anti-inflammatories physical therapy. She does report occasional cramping underneath her left first metatarsal just starting in the last three months or so.  She wears orthotics in the right shoe only because they are custom made and do not fit the left since the surgery.    Past Medical History:   Diagnosis Date    Focal seizures     History of polycystic ovarian disease     Seizures     2013    Speech delay        Past Surgical History:   Procedure Laterality Date    FOOT SURGERY      TONSILLECTOMY, ADENOIDECTOMY, BILATERAL MYRINGOTOMY AND TUBES      WISDOM TOOTH EXTRACTION         Family History   Problem Relation Age of Onset    Hypertension Mother     Heart disease Mother     Arthritis Mother     Cholelithiasis Mother     Hypertension Father     Asthma Sister     Endometriosis Sister     Anemia Sister     Hypertension Maternal Grandmother     Heart disease Maternal Grandfather     Breast cancer Paternal Grandmother     Myasthenia gravis Paternal Grandmother     Myasthenia gravis Paternal Grandfather     Diabetes Paternal Grandfather     Irritable bowel syndrome Maternal Aunt     Ulcerative colitis Maternal Uncle     Ovarian cancer Other        Social History     Social History    Marital status: Single     Spouse name: N/A    Number of children: N/A    Years of education: N/A     Social History Main Topics    Smoking status: Never Smoker    Smokeless tobacco: Never Used    Alcohol use Yes      Comment: only  "on special occasions    Drug use: No    Sexual activity: Yes     Partners: Male     Birth control/ protection: Condom     Other Topics Concern    None     Social History Narrative    Lives with Mom, Dad.Sister is in college.1 dog, 1 cat, 1 rabbitIn 10th grade. School is "ok".       Current Outpatient Prescriptions   Medication Sig Dispense Refill    AMITIZA 24 mcg Cap TAKE 1 CAPSULE (24 MCG TOTAL) BY MOUTH 2 (TWO) TIMES DAILY. 60 capsule 9    ethosuximide (ZARONTIN) 250 mg Cap 250 mg 2 (two) times daily.       lamotrigine (LAMICTAL) 200 MG tablet One twice daily (Patient taking differently: Take 200 mg by mouth 2 (two) times daily. One twice daily) 60 tablet 0    norgestimate-ethinyl estradiol (ORTHO TRI-CYCLEN LO) 0.18/0.215/0.25 mg-25 mcg tablet Take 1 tablet by mouth once daily. 28 tablet 12    tazarotene (TAZORAC) 0.05 % Crea cream Thin film to face qhs, take night off if irritation develops 60 g 3     No current facility-administered medications for this visit.        Review of patient's allergies indicates:  No Known Allergies    Review of Systems  ROS:  Constitution: Negative for chills, fever, weakness and malaise/fatigue.   HEENT: Negative for headaches.   Cardiovascular: Negative for chest pain and claudication.   Respiratory: Negative for cough and shortness of breath.   Musculoskeletal: Positive for right foot pain.  Negative for muscle cramps and muscle weakness.   Gastrointestinal: Negative for nausea and vomiting.   Neurological: Negative for numbness and paresthesias.   Dermatological: Negativefor skin rash, Negative for calluses, Negative for fungal nails, Negative for wound.        Objective:      Physical Exam  Constitutional:  Patient is oriented to person, place, and time. Vital signs are normal.  Appears well-developed and well-nourished.     Vascular:  Dorsalis pedis pulses are 2/4 on the right side, and 2/4 on the left side.   Posterior tibial pulses are 2/4 on the right side, and 2/4 " on the left side.   Positive for digital hair growth, capillary fill time to all toes <3 seconds, toes are warm touch, trace pedal swelling    Skin/Dermatological:  Skin is warm and intact.  No cyanosis or clubbing.  No rashes noted.  No open wounds.      Musculoskeletal:      Hallux abducto valgus right, hypermobile right subtalar joint with pes planus right.  Pedal rom within normal limits.  (--) ankle joint DF restriction with both knee flexed and extened.  Left foot well aligned.      Neurological:  (--) deficits to sharp/dull, light touch or vibratory sensation bilateral feet, ten points tested.   Muscle strength to tibialis anterior, extensor hallucis longus, extensor digitorum longus, peroneal muscles, flexor hallucis/digotorum longus, posterior tibial and gastrosoleal complex is 5/5, normal tone without assymmetry   Patellar reflexes are 2+ on the right side and 2+ on the left side.  Achilles reflexes are 2+ on the right side and 2+ on the left side.        Assessment:       1. Hallux valgus of right foot    2. Pes valgus    3. Hammer toe of right foot        Plan:       Hallux valgus of right foot    Pes valgus    Hammer toe of right foot        Discussion of the etiology of the problem/s and the treatment plan. Plan for ORIF rightsubtalar joint with Hyprocure, rightAustin bunionectomy, right fifth toe arthroplasty with derotational skinplasty.  The risks, benefits, complications, treatment options, and expected outcomes were discussed with the patient who verbalized understanding. No guarantees implied or given.  We did discuss tendinitis, wound dehiscence, nonunion, painful scar, fracture, need for additional surgery, floating toes, hallux varus, infection as possible complications.  I do believe the cramping on the left is likely compensation due the asymetry of her feet and use of one orthotic.  Surgery is local MAC for June 1, 2018/

## 2018-05-16 NOTE — TELEPHONE ENCOUNTER
----- Message from Lm Obrien DPM sent at 5/16/2018  3:06 PM CDT -----  X-ray on the left look good, implant in place and toes in good position, bunions and flattening on the right.    Calling patient and left message about Xray results.

## 2018-05-28 ENCOUNTER — ANESTHESIA EVENT (OUTPATIENT)
Dept: SURGERY | Facility: OTHER | Age: 23
End: 2018-05-28
Payer: COMMERCIAL

## 2018-05-28 ENCOUNTER — HOSPITAL ENCOUNTER (OUTPATIENT)
Dept: PREADMISSION TESTING | Facility: OTHER | Age: 23
Discharge: HOME | End: 2018-05-28
Payer: COMMERCIAL

## 2018-05-28 VITALS
WEIGHT: 230 LBS | DIASTOLIC BLOOD PRESSURE: 72 MMHG | HEART RATE: 90 BPM | SYSTOLIC BLOOD PRESSURE: 111 MMHG | OXYGEN SATURATION: 100 % | HEIGHT: 61 IN | BODY MASS INDEX: 43.43 KG/M2 | TEMPERATURE: 98 F

## 2018-05-28 DIAGNOSIS — M20.11 HALLUX VALGUS OF RIGHT FOOT: Primary | ICD-10-CM

## 2018-05-28 DIAGNOSIS — M20.41 HAMMER TOE OF RIGHT FOOT: ICD-10-CM

## 2018-05-28 DIAGNOSIS — Q66.6 PES VALGUS: ICD-10-CM

## 2018-05-28 RX ORDER — LIDOCAINE HYDROCHLORIDE 10 MG/ML
1 INJECTION, SOLUTION EPIDURAL; INFILTRATION; INTRACAUDAL; PERINEURAL ONCE
Status: CANCELLED | OUTPATIENT
Start: 2018-05-28 | End: 2018-05-28

## 2018-05-28 RX ORDER — MIDAZOLAM HYDROCHLORIDE 5 MG/ML
5 INJECTION INTRAMUSCULAR; INTRAVENOUS
Status: CANCELLED | OUTPATIENT
Start: 2018-05-28 | End: 2018-05-28

## 2018-05-28 RX ORDER — SODIUM CHLORIDE, SODIUM LACTATE, POTASSIUM CHLORIDE, CALCIUM CHLORIDE 600; 310; 30; 20 MG/100ML; MG/100ML; MG/100ML; MG/100ML
INJECTION, SOLUTION INTRAVENOUS CONTINUOUS
Status: CANCELLED | OUTPATIENT
Start: 2018-05-28

## 2018-05-28 NOTE — ANESTHESIA PREPROCEDURE EVALUATION
05/28/2018  Terra Hamilton is a 22 y.o., female.    Anesthesia Evaluation    I have reviewed the Patient Summary Reports.    I have reviewed the Nursing Notes.   I have reviewed the Medications.     Review of Systems  Anesthesia Hx:  No problems with previous Anesthesia  Denies Family Hx of Anesthesia complications.   Denies Personal Hx of Anesthesia complications.   Social:  Non-Smoker    Hematology/Oncology:  Hematology Normal   Oncology Normal     EENT/Dental:EENT/Dental Normal   Cardiovascular:  Cardiovascular Normal Exercise tolerance: good     Pulmonary:  Pulmonary Normal    Renal/:  Renal/ Normal     Musculoskeletal:  Musculoskeletal Normal    Neurological:   Seizures, well controlled    Endocrine:  Endocrine Normal    Dermatological:  Skin Normal    Psych:  Psychiatric Normal           Physical Exam  General:  Morbid Obesity    Airway/Jaw/Neck:  Airway Findings: Mouth Opening: Normal Tongue: Normal  General Airway Assessment: Adult  Mallampati: I  TM Distance: Normal, at least 6 cm  Jaw/Neck Findings:  Neck ROM: Normal ROM      Dental:  Dental Findings: In tact             Anesthesia Plan  Type of Anesthesia, risks & benefits discussed:  Anesthesia Type:  MAC  Patient's Preference:   Intra-op Monitoring Plan: standard ASA monitors  Intra-op Monitoring Plan Comments:   Post Op Pain Control Plan: per primary service following discharge from PACU  Post Op Pain Control Plan Comments:   Induction:   IV  Beta Blocker:         Informed Consent: Patient understands risks and agrees with Anesthesia plan.  Questions answered. Anesthesia consent signed with patient.  ASA Score: 2     Day of Surgery Review of History & Physical:    H&P update referred to the surgeon.     Anesthesia Plan Notes: IV propofol with block per surgeon        Ready For Surgery From Anesthesia Perspective.

## 2018-06-01 ENCOUNTER — HOSPITAL ENCOUNTER (OUTPATIENT)
Facility: OTHER | Age: 23
Discharge: HOME | End: 2018-06-01
Payer: COMMERCIAL

## 2018-06-01 ENCOUNTER — ANESTHESIA (OUTPATIENT)
Dept: SURGERY | Facility: OTHER | Age: 23
End: 2018-06-01
Payer: COMMERCIAL

## 2018-06-01 VITALS
SYSTOLIC BLOOD PRESSURE: 127 MMHG | HEART RATE: 92 BPM | RESPIRATION RATE: 18 BRPM | OXYGEN SATURATION: 98 % | WEIGHT: 230 LBS | HEIGHT: 61 IN | TEMPERATURE: 98 F | BODY MASS INDEX: 43.43 KG/M2 | DIASTOLIC BLOOD PRESSURE: 83 MMHG

## 2018-06-01 DIAGNOSIS — M20.41 HAMMER TOE OF RIGHT FOOT: ICD-10-CM

## 2018-06-01 DIAGNOSIS — M20.11 HALLUX VALGUS OF RIGHT FOOT: ICD-10-CM

## 2018-06-01 DIAGNOSIS — Q66.6 PES VALGUS: ICD-10-CM

## 2018-06-01 LAB
B-HCG UR QL: NEGATIVE
CTP QC/QA: YES

## 2018-06-01 PROCEDURE — 36000709 HC OR TIME LEV III EA ADD 15 MIN

## 2018-06-01 PROCEDURE — 63600175 PHARM REV CODE 636 W HCPCS: Performed by: ANESTHESIOLOGY

## 2018-06-01 PROCEDURE — 37000008 HC ANESTHESIA 1ST 15 MINUTES

## 2018-06-01 PROCEDURE — 71000039 HC RECOVERY, EACH ADD'L HOUR

## 2018-06-01 PROCEDURE — 63600175 PHARM REV CODE 636 W HCPCS: Performed by: NURSE ANESTHETIST, CERTIFIED REGISTERED

## 2018-06-01 PROCEDURE — S0020 INJECTION, BUPIVICAINE HYDRO: HCPCS

## 2018-06-01 PROCEDURE — 27800903 OPTIME MED/SURG SUP & DEVICES OTHER IMPLANTS

## 2018-06-01 PROCEDURE — 25000003 PHARM REV CODE 250

## 2018-06-01 PROCEDURE — 71000016 HC POSTOP RECOV ADDL HR

## 2018-06-01 PROCEDURE — 28585 REPAIR FOOT DISLOCATION: CPT | Mod: RT,,,

## 2018-06-01 PROCEDURE — 81025 URINE PREGNANCY TEST: CPT | Performed by: ANESTHESIOLOGY

## 2018-06-01 PROCEDURE — C1713 ANCHOR/SCREW BN/BN,TIS/BN: HCPCS

## 2018-06-01 PROCEDURE — 25000003 PHARM REV CODE 250: Performed by: ANESTHESIOLOGY

## 2018-06-01 PROCEDURE — 28285 REPAIR OF HAMMERTOE: CPT | Mod: 59,T9,,

## 2018-06-01 PROCEDURE — 63600175 PHARM REV CODE 636 W HCPCS

## 2018-06-01 PROCEDURE — 71000033 HC RECOVERY, INTIAL HOUR

## 2018-06-01 PROCEDURE — 37000009 HC ANESTHESIA EA ADD 15 MINS

## 2018-06-01 PROCEDURE — 28296 COR HLX VLGS DSTL MTAR OSTEO: CPT | Mod: 51,T5,,

## 2018-06-01 PROCEDURE — 27201423 OPTIME MED/SURG SUP & DEVICES STERILE SUPPLY

## 2018-06-01 PROCEDURE — 71000015 HC POSTOP RECOV 1ST HR

## 2018-06-01 PROCEDURE — 36000708 HC OR TIME LEV III 1ST 15 MIN

## 2018-06-01 PROCEDURE — C2617 STENT, NON-COR, TEM W/O DEL: HCPCS

## 2018-06-01 RX ORDER — LIDOCAINE HCL/PF 100 MG/5ML
SYRINGE (ML) INTRAVENOUS
Status: DISCONTINUED | OUTPATIENT
Start: 2018-06-01 | End: 2018-06-01

## 2018-06-01 RX ORDER — OXYCODONE AND ACETAMINOPHEN 7.5; 325 MG/1; MG/1
2 TABLET ORAL EVERY 6 HOURS PRN
Qty: 50 TABLET | Refills: 0 | Status: SHIPPED | OUTPATIENT
Start: 2018-06-01 | End: 2018-08-06

## 2018-06-01 RX ORDER — ONDANSETRON HYDROCHLORIDE 2 MG/ML
INJECTION, SOLUTION INTRAMUSCULAR; INTRAVENOUS
Status: DISCONTINUED | OUTPATIENT
Start: 2018-06-01 | End: 2018-06-01

## 2018-06-01 RX ORDER — SODIUM CHLORIDE, SODIUM LACTATE, POTASSIUM CHLORIDE, CALCIUM CHLORIDE 600; 310; 30; 20 MG/100ML; MG/100ML; MG/100ML; MG/100ML
INJECTION, SOLUTION INTRAVENOUS CONTINUOUS
Status: DISCONTINUED | OUTPATIENT
Start: 2018-06-01 | End: 2018-06-01 | Stop reason: HOSPADM

## 2018-06-01 RX ORDER — SODIUM CHLORIDE 0.9 % (FLUSH) 0.9 %
3 SYRINGE (ML) INJECTION
Status: DISCONTINUED | OUTPATIENT
Start: 2018-06-01 | End: 2018-06-01 | Stop reason: HOSPADM

## 2018-06-01 RX ORDER — BUPIVACAINE HYDROCHLORIDE 5 MG/ML
INJECTION, SOLUTION EPIDURAL; INTRACAUDAL
Status: DISCONTINUED | OUTPATIENT
Start: 2018-06-01 | End: 2018-06-01 | Stop reason: HOSPADM

## 2018-06-01 RX ORDER — SCOLOPAMINE TRANSDERMAL SYSTEM 1 MG/1
1 PATCH, EXTENDED RELEASE TRANSDERMAL ONCE
Status: DISCONTINUED | OUTPATIENT
Start: 2018-06-01 | End: 2018-06-01 | Stop reason: HOSPADM

## 2018-06-01 RX ORDER — FENTANYL CITRATE 50 UG/ML
INJECTION, SOLUTION INTRAMUSCULAR; INTRAVENOUS
Status: DISCONTINUED | OUTPATIENT
Start: 2018-06-01 | End: 2018-06-01

## 2018-06-01 RX ORDER — MIDAZOLAM HYDROCHLORIDE 1 MG/ML
INJECTION INTRAMUSCULAR; INTRAVENOUS
Status: DISCONTINUED | OUTPATIENT
Start: 2018-06-01 | End: 2018-06-01

## 2018-06-01 RX ORDER — LIDOCAINE HYDROCHLORIDE 10 MG/ML
1 INJECTION INFILTRATION; PERINEURAL ONCE
Status: DISCONTINUED | OUTPATIENT
Start: 2018-06-01 | End: 2018-06-01 | Stop reason: HOSPADM

## 2018-06-01 RX ORDER — MEPERIDINE HYDROCHLORIDE 50 MG/ML
12.5 INJECTION INTRAMUSCULAR; INTRAVENOUS; SUBCUTANEOUS ONCE AS NEEDED
Status: DISCONTINUED | OUTPATIENT
Start: 2018-06-01 | End: 2018-06-01 | Stop reason: HOSPADM

## 2018-06-01 RX ORDER — PROPOFOL 10 MG/ML
VIAL (ML) INTRAVENOUS
Status: DISCONTINUED | OUTPATIENT
Start: 2018-06-01 | End: 2018-06-01

## 2018-06-01 RX ORDER — DEXAMETHASONE SODIUM PHOSPHATE 4 MG/ML
INJECTION, SOLUTION INTRA-ARTICULAR; INTRALESIONAL; INTRAMUSCULAR; INTRAVENOUS; SOFT TISSUE
Status: DISCONTINUED | OUTPATIENT
Start: 2018-06-01 | End: 2018-06-01

## 2018-06-01 RX ORDER — MIDAZOLAM HYDROCHLORIDE 5 MG/ML
5 INJECTION INTRAMUSCULAR; INTRAVENOUS
Status: DISCONTINUED | OUTPATIENT
Start: 2018-06-01 | End: 2018-06-01 | Stop reason: HOSPADM

## 2018-06-01 RX ORDER — HYDROCODONE BITARTRATE AND ACETAMINOPHEN 5; 325 MG/1; MG/1
1 TABLET ORAL EVERY 4 HOURS PRN
Status: DISCONTINUED | OUTPATIENT
Start: 2018-06-01 | End: 2018-06-01 | Stop reason: HOSPADM

## 2018-06-01 RX ORDER — CEFAZOLIN SODIUM 2 G/50ML
2 SOLUTION INTRAVENOUS
Status: COMPLETED | OUTPATIENT
Start: 2018-06-01 | End: 2018-06-01

## 2018-06-01 RX ORDER — OXYCODONE HYDROCHLORIDE 5 MG/1
5 TABLET ORAL
Status: DISCONTINUED | OUTPATIENT
Start: 2018-06-01 | End: 2018-06-01 | Stop reason: HOSPADM

## 2018-06-01 RX ORDER — FENTANYL CITRATE 50 UG/ML
25 INJECTION, SOLUTION INTRAMUSCULAR; INTRAVENOUS EVERY 5 MIN PRN
Status: DISCONTINUED | OUTPATIENT
Start: 2018-06-01 | End: 2018-06-01 | Stop reason: HOSPADM

## 2018-06-01 RX ORDER — HYDROMORPHONE HYDROCHLORIDE 2 MG/ML
INJECTION, SOLUTION INTRAMUSCULAR; INTRAVENOUS; SUBCUTANEOUS
Status: DISCONTINUED | OUTPATIENT
Start: 2018-06-01 | End: 2018-06-01

## 2018-06-01 RX ORDER — PROPOFOL 10 MG/ML
VIAL (ML) INTRAVENOUS CONTINUOUS PRN
Status: DISCONTINUED | OUTPATIENT
Start: 2018-06-01 | End: 2018-06-01

## 2018-06-01 RX ORDER — ONDANSETRON 2 MG/ML
4 INJECTION INTRAMUSCULAR; INTRAVENOUS DAILY PRN
Status: DISCONTINUED | OUTPATIENT
Start: 2018-06-01 | End: 2018-06-01 | Stop reason: HOSPADM

## 2018-06-01 RX ORDER — DEXAMETHASONE SODIUM PHOSPHATE 4 MG/ML
INJECTION, SOLUTION INTRA-ARTICULAR; INTRALESIONAL; INTRAMUSCULAR; INTRAVENOUS; SOFT TISSUE
Status: DISCONTINUED | OUTPATIENT
Start: 2018-06-01 | End: 2018-06-01 | Stop reason: HOSPADM

## 2018-06-01 RX ORDER — ACETAMINOPHEN 10 MG/ML
INJECTION, SOLUTION INTRAVENOUS
Status: DISCONTINUED | OUTPATIENT
Start: 2018-06-01 | End: 2018-06-01

## 2018-06-01 RX ORDER — LIDOCAINE HYDROCHLORIDE 20 MG/ML
INJECTION, SOLUTION EPIDURAL; INFILTRATION; INTRACAUDAL; PERINEURAL
Status: DISCONTINUED | OUTPATIENT
Start: 2018-06-01 | End: 2018-06-01 | Stop reason: HOSPADM

## 2018-06-01 RX ADMIN — FENTANYL CITRATE 100 MCG: 50 INJECTION, SOLUTION INTRAMUSCULAR; INTRAVENOUS at 02:06

## 2018-06-01 RX ADMIN — PROPOFOL 50 MG: 10 INJECTION, EMULSION INTRAVENOUS at 02:06

## 2018-06-01 RX ADMIN — PROPOFOL 50 MG: 10 INJECTION, EMULSION INTRAVENOUS at 03:06

## 2018-06-01 RX ADMIN — SCOPALAMINE 1 PATCH: 1 PATCH, EXTENDED RELEASE TRANSDERMAL at 07:06

## 2018-06-01 RX ADMIN — MIDAZOLAM HYDROCHLORIDE 2 MG: 1 INJECTION, SOLUTION INTRAMUSCULAR; INTRAVENOUS at 02:06

## 2018-06-01 RX ADMIN — HYDROMORPHONE HYDROCHLORIDE 0.6 MG: 2 INJECTION INTRAMUSCULAR; INTRAVENOUS; SUBCUTANEOUS at 04:06

## 2018-06-01 RX ADMIN — FENTANYL CITRATE 50 MCG: 50 INJECTION, SOLUTION INTRAMUSCULAR; INTRAVENOUS at 03:06

## 2018-06-01 RX ADMIN — CEFAZOLIN SODIUM 2 G: 2 SOLUTION INTRAVENOUS at 02:06

## 2018-06-01 RX ADMIN — HYDROMORPHONE HYDROCHLORIDE 0.8 MG: 2 INJECTION INTRAMUSCULAR; INTRAVENOUS; SUBCUTANEOUS at 04:06

## 2018-06-01 RX ADMIN — PROPOFOL 150 MG: 10 INJECTION, EMULSION INTRAVENOUS at 03:06

## 2018-06-01 RX ADMIN — DEXAMETHASONE SODIUM PHOSPHATE 8 MG: 4 INJECTION, SOLUTION INTRAMUSCULAR; INTRAVENOUS at 03:06

## 2018-06-01 RX ADMIN — ONDANSETRON 4 MG: 2 INJECTION INTRAMUSCULAR; INTRAVENOUS at 05:06

## 2018-06-01 RX ADMIN — LIDOCAINE HYDROCHLORIDE 50 MG: 20 INJECTION, SOLUTION INTRAVENOUS at 02:06

## 2018-06-01 RX ADMIN — SODIUM CHLORIDE, SODIUM LACTATE, POTASSIUM CHLORIDE, AND CALCIUM CHLORIDE: 600; 310; 30; 20 INJECTION, SOLUTION INTRAVENOUS at 01:06

## 2018-06-01 RX ADMIN — ONDANSETRON 4 MG: 2 INJECTION, SOLUTION INTRAMUSCULAR; INTRAVENOUS at 03:06

## 2018-06-01 RX ADMIN — HYDROMORPHONE HYDROCHLORIDE 0.6 MG: 2 INJECTION INTRAMUSCULAR; INTRAVENOUS; SUBCUTANEOUS at 03:06

## 2018-06-01 RX ADMIN — PROPOFOL 50 MCG/KG/MIN: 10 INJECTION, EMULSION INTRAVENOUS at 02:06

## 2018-06-01 RX ADMIN — SODIUM CHLORIDE, SODIUM LACTATE, POTASSIUM CHLORIDE, AND CALCIUM CHLORIDE: 600; 310; 30; 20 INJECTION, SOLUTION INTRAVENOUS at 04:06

## 2018-06-01 RX ADMIN — ACETAMINOPHEN 1000 MG: 10 INJECTION, SOLUTION INTRAVENOUS at 03:06

## 2018-06-01 NOTE — DISCHARGE INSTRUCTIONS
FOLLOW ANY OTHER INSTRUCTIONS GIVEN PER DR BRUSH!!!!          Anesthesia: After Your Surgery  Youve just had surgery. During surgery, you received medication called anesthesia to keep you comfortable and pain-free. After surgery, you may experience some pain or nausea. This is common. Here are some tips for feeling better and recovering after surgery.    Going home  Your doctor or nurse will show you how to take care of yourself when you go home. He or she will also answer your questions. Have an adult family member or friend drive you home. For the first 24 hours after your surgery:  · Do not drive or use heavy equipment.  · Do not make important decisions or sign legal documents.  · Avoid alcohol.  · Have someone stay with you, if needed. He or she can watch for problems and help keep you safe.  Be sure to keep all follow-up appointments with your doctor. And rest after your procedure for as long as your doctor tells you to.    Coping with pain  If you have pain after surgery, pain medication will help you feel better. Take it as directed, before pain becomes severe. Also, ask your doctor or pharmacist about other ways to control pain, such as with heat, ice, and relaxation. And follow any other instructions your surgeon or nurse gives you.    Tips for taking pain medication  To get the best relief possible, remember these points:  · Pain medications can upset your stomach. Taking them with a little food may help.  · Most pain relievers taken by mouth need at least 20 to 30 minutes to take effect.  · Taking medication on a schedule can help you remember to take it. Try to time your medication so that you can take it before beginning an activity, such as dressing, walking, or sitting down for dinner.  · Constipation is a common side effect of pain medications. Contact your doctor before taking any medications like laxatives or stool softeners to help relieve constipation. Also ask about any dietary  restrictions, because drinking lots of fluids and eating foods like fruits and vegetables that are high in fiber can also help. Remember, dont take laxatives unless your surgeon has prescribed them.  · Mixing alcohol and pain medication can cause dizziness and slow your breathing. It can even be fatal. Dont drink alcohol while taking pain medication.  · Pain medication can slow your reflexes. Dont drive or operate machinery while taking pain medication.  If your health care provider tells you to take acetaminophen to help relieve your pain, ask him or her how much you are supposed to take each day. (Acetaminophen is the generic name for Tylenol and other brand-name pain relievers.) Acetaminophen or other pain relievers may interact with your prescription medicines or other over-the-counter (OTC) drugs. Some prescription medications contain acetaminophen along with other active ingredients. Using both prescription and OTC acetaminophen for pain can cause you to overdose. The FDA recommends that you read the labels on your OTC medications carefully. This will help you to clearly understand the list of active ingredients, dosing instructions, and any warnings. It may also help you avoid taking too much acetaminophen. If you have questions or don't understand the information, ask your pharmacist or health care provider to explain it to you before you take the OTC medication.    Managing nausea  Some people have an upset stomach after surgery. This is often due to anesthesia, pain, pain medications, or the stress of surgery. The following tips will help you manage nausea and get good nutrition as you recover. If you were on a special diet before surgery, ask your doctor if you should follow it during recovery. These tips may help:  · Dont push yourself to eat. Your body will tell you when to eat and how much.  · Start off with clear liquids and soup. They are easier to digest.  · Progress to semi-solid foods (mashed  potatoes, applesauce, and gelatin) as you feel ready.  · Slowly move to solid foods. Dont eat fatty, rich, or spicy foods at first.  · Dont force yourself to have three large meals a day. Instead, eat smaller amounts more often.  · Take pain medications with a small amount of solid food, such as crackers or toast to avoid nausea.      Call your surgeon if  · You still have pain an hour after taking medication (it may not be strong enough).  · You feel too sleepy, dizzy, or groggy (medication may be too strong).  · You have side effects like nausea, vomiting, or skin changes (rash, itching, or hives).   © 4830-2880 [employer]. [address] [place] [address], [place] [address]. All rights reserved. This information is not intended as a substitute for professional medical care. Always follow your healthcare professional's instructions.

## 2018-06-01 NOTE — TRANSFER OF CARE
"Anesthesia Transfer of Care Note    Patient: Terra Hamilton    Procedure(s) Performed: Procedure(s) (LRB):  BUNIONECTOMY (Right)  orif subtalar joint with Hyprocure implant  (Right)  CORRECTION, HAMMER TOE (Right)    Patient location: PACU    Anesthesia Type: general    Transport from OR: Transported from OR on 2-3 L/min O2 by NC with adequate spontaneous ventilation    Post pain: adequate analgesia    Post assessment: no apparent anesthetic complications    Post vital signs: stable    Level of consciousness: awake    Nausea/Vomiting: no nausea/vomiting    Complications: none    Transfer of care protocol was followed      Last vitals:   Visit Vitals  /70 (BP Location: Left arm, Patient Position: Sitting)   Pulse 96   Temp 36.8 °C (98.3 °F) (Oral)   Resp 16   Ht 5' 1" (1.549 m)   Wt 104.3 kg (230 lb)   LMP 05/22/2018 (Approximate)   SpO2 100%   Breastfeeding? No   BMI 43.46 kg/m²     "

## 2018-06-01 NOTE — ANESTHESIA POSTPROCEDURE EVALUATION
"Anesthesia Post Evaluation    Patient: Terra Hamilton    Procedure(s) Performed: Procedure(s) (LRB):  BUNIONECTOMY (Right)  orif subtalar joint with Hyprocure implant  (Right)  CORRECTION, HAMMER TOE (Right)    Final Anesthesia Type: general  Patient location during evaluation: PACU  Patient participation: Yes- Able to Participate  Level of consciousness: awake and alert and oriented  Post-procedure vital signs: reviewed and stable  Pain management: adequate  Airway patency: patent  PONV status at discharge: No PONV  Anesthetic complications: no      Cardiovascular status: blood pressure returned to baseline and hemodynamically stable  Respiratory status: unassisted, spontaneous ventilation and room air  Hydration status: euvolemic  Follow-up not needed.        Visit Vitals  /60   Pulse 102   Temp 37 °C (98.6 °F) (Oral)   Resp 18   Ht 5' 1" (1.549 m)   Wt 104.3 kg (230 lb)   LMP 05/22/2018 (Approximate)   SpO2 (!) 94%   Breastfeeding? No   BMI 43.46 kg/m²       Pain/Cira Score: Pain Assessment Performed: Yes (6/1/2018  5:40 PM)  Presence of Pain: denies (6/1/2018  5:40 PM)  Cira Score: 8 (6/1/2018  5:40 PM)      "

## 2018-06-01 NOTE — BRIEF OP NOTE
[facility]  Brief Operative Note     SUMMARY     Surgery Date: 6/1/2018     Surgeon(s) and Role:     * Lm Obrien DPM - Primary    Assisting Surgeon: None    Pre-op Diagnosis:  Pes valgus [Q66.6]  Hammer toe of right foot [M20.41]  Hallux valgus of right foot [M20.11]    Post-op Diagnosis:  Post-Op Diagnosis Codes:     * Pes valgus [Q66.6]     * Hammer toe of right foot [M20.41]     * Hallux valgus of right foot [M20.11]    Procedure(s) (LRB):  BUNIONECTOMY (Right)  orif subtalar joint with Hyprocure implant  (Right)  CORRECTION, HAMMER TOE (Right)    Anesthesia: Local MAC +15cc of 1:1 mixture of 0.5% marcaine plain and 2% lidocaine plain     Description of the findings of the procedure: Right ORIF STJ hyprocure implant. Bunionectomy right. Derotational arthroplasty right 5th toe.     Findings/Key Components: same as above     Estimated Blood Loss: * No values recorded between 6/1/2018  2:27 PM and 6/1/2018  5:07 PM *         Specimens:   Specimen (12h ago through future)    None          Discharge Note    SUMMARY     Admit Date: 6/1/2018    Discharge Date and Time:  06/01/2018 5:13 PM    Hospital Course (synopsis of major diagnoses, care, treatment, and services provided during the course of the hospital stay): Patient was admitted for an inpatient procedure and tolerated the procedure well with no complications.       Final Diagnosis: Post-Op Diagnosis Codes:     * Pes valgus [Q66.6]     * Hammer toe of right foot [M20.41]     * Hallux valgus of right foot [M20.11]    Disposition: Home or Self Care    Follow Up/Patient Instructions:     Medications:  Reconciled Home Medications:      Medication List      CHANGE how you take these medications    lamoTRIgine 200 MG tablet  Commonly known as:  LAMICTAL  One twice daily  What changed:  · how much to take  · how to take this  · when to take this  · additional instructions        CONTINUE taking these medications    AMITIZA 24 MCG  Cap  Generic drug:  lubiprostone  TAKE 1 CAPSULE (24 MCG TOTAL) BY MOUTH 2 (TWO) TIMES DAILY.     ethosuximide 250 mg Cap  Commonly known as:  ZARONTIN  250 mg 2 (two) times daily.     norgestimate-ethinyl estradiol 0.18/0.215/0.25 mg-25 mcg tablet  Commonly known as:  ORTHO TRI-CYCLEN LO  Take 1 tablet by mouth once daily.     tazarotene 0.05 % Crea cream  Commonly known as:  TAZORAC  Thin film to face qhs, take night off if irritation develops        ASK your doctor about these medications    oxyCODONE-acetaminophen 7.5-325 mg per tablet  Commonly known as:  PERCOCET  Take 2 tablets by mouth every 6 (six) hours as needed for Pain.            Discharge Procedure Orders  Diet general     Weight bearing restrictions (specify)   Order Comments: NWB right     Call MD for:  temperature >100.4     Call MD for:  persistent nausea and vomiting     Call MD for:  severe uncontrolled pain     Call MD for:  difficulty breathing, headache or visual disturbances     Call MD for:  redness, tenderness, or signs of infection (pain, swelling, redness, odor or green/yellow discharge around incision site)

## 2018-06-01 NOTE — INTERVAL H&P NOTE
The patient has been examined and the H&P has been reviewed:    I concur with the findings and no changes have occurred since H&P was written.    Anesthesia/Surgery risks, benefits and alternative options discussed and understood by patient/family.          Active Hospital Problems    Diagnosis  POA    Hallux valgus of right foot [M20.11]  Yes      Resolved Hospital Problems    Diagnosis Date Resolved POA   No resolved problems to display.

## 2018-06-02 NOTE — PLAN OF CARE
Terrabea Chaney Joy has met all discharge criteria from Phase II. Vital Signs are stable, ambulating  without difficulty.Pain is now under control and tolerable for the pt. Pain score 3 at this time.  Discharge instructions given, patient verbalized understanding. Discharged from facility via wheelchair in stable condition.

## 2018-06-04 ENCOUNTER — OFFICE VISIT (OUTPATIENT)
Dept: PODIATRY | Facility: CLINIC | Age: 23
End: 2018-06-04
Payer: COMMERCIAL

## 2018-06-04 VITALS
BODY MASS INDEX: 43.43 KG/M2 | HEART RATE: 95 BPM | DIASTOLIC BLOOD PRESSURE: 85 MMHG | HEIGHT: 61 IN | WEIGHT: 230 LBS | SYSTOLIC BLOOD PRESSURE: 143 MMHG

## 2018-06-04 DIAGNOSIS — Z09 FOLLOW-UP EXAMINATION FOLLOWING SURGERY: Primary | ICD-10-CM

## 2018-06-04 PROCEDURE — 99024 POSTOP FOLLOW-UP VISIT: CPT | Mod: S$GLB,,,

## 2018-06-04 PROCEDURE — 99999 PR PBB SHADOW E&M-EST. PATIENT-LVL III: CPT | Mod: PBBFAC,,,

## 2018-06-04 RX ORDER — TRAMADOL HYDROCHLORIDE 50 MG/1
50 TABLET ORAL EVERY 6 HOURS PRN
Qty: 50 TABLET | Refills: 0 | Status: SHIPPED | OUTPATIENT
Start: 2018-06-04 | End: 2018-06-14

## 2018-06-04 NOTE — PROGRESS NOTES
Chief Complaint   Patient presents with    Follow-up     post op        Patient returns status post right bunionectomy, 5th toe arthroplasty and orif subtalar joint  x 4  days.  Patient states doing well.  Patient's pain is well controlled with the pain medications.  Patient denies any nausea vomiting fever or chills or calf pain.  Patient has been heel  weightbearing with crutches.  Patient's  dressing is clean dry and intact.    PE:    Incision site looks well coapted.  The sutures are intact.  There is minimal swelling and no drainage.  There is no hematoma.  There is good capillary fill time to all digits.  No neurovascular compromise.    Assessment:  Doing well postoperatively.        Plan:   1. Continue any current medications.  2. Wound was redressed under sterile conditions.  3. Patient to continue heel weightbearing with postoperative shoe.  4.  Return to clinic 7 days for suture removal.

## 2018-06-11 ENCOUNTER — OFFICE VISIT (OUTPATIENT)
Dept: PODIATRY | Facility: CLINIC | Age: 23
End: 2018-06-11
Payer: COMMERCIAL

## 2018-06-11 VITALS
BODY MASS INDEX: 43.43 KG/M2 | HEART RATE: 112 BPM | DIASTOLIC BLOOD PRESSURE: 82 MMHG | WEIGHT: 230 LBS | HEIGHT: 61 IN | SYSTOLIC BLOOD PRESSURE: 125 MMHG

## 2018-06-11 DIAGNOSIS — Z09 FOLLOW-UP EXAMINATION FOLLOWING SURGERY: Primary | ICD-10-CM

## 2018-06-11 PROCEDURE — 99024 POSTOP FOLLOW-UP VISIT: CPT | Mod: S$GLB,,,

## 2018-06-11 PROCEDURE — 99999 PR PBB SHADOW E&M-EST. PATIENT-LVL III: CPT | Mod: PBBFAC,,,

## 2018-06-11 NOTE — PROGRESS NOTES
Chief Complaint   Patient presents with    Follow-up     Post Op       Patient returns status post right bunionectomy, 5th toe arthroplasty and orif subtalar joint  x 11  days.  Patient states doing well.  Patient's pain is well controlled with the pain medications.  Patient denies any nausea vomiting fever or chills or calf pain.  Patient has been heel  weightbearing with crutches.  Patient's  dressing is clean dry and intact.    PE:    Incision site looks well coapted.  The sutures are intact.  There is minimal swelling and no drainage.  There is no hematoma.  There is good capillary fill time to all digits.  No neurovascular compromise.    Assessment:  Doing well postoperatively.        Plan:   1. Continue any current medications.  2. Wound was redressed under sterile conditions.  3. Patient to continue heel weightbearing with postoperative shoe.  4.  Return to clinic 7 days for suture removal.

## 2018-06-18 ENCOUNTER — HOSPITAL ENCOUNTER (OUTPATIENT)
Dept: RADIOLOGY | Facility: HOSPITAL | Age: 23
Discharge: HOME | End: 2018-06-18
Payer: COMMERCIAL

## 2018-06-18 ENCOUNTER — OFFICE VISIT (OUTPATIENT)
Dept: PODIATRY | Facility: CLINIC | Age: 23
End: 2018-06-18
Payer: COMMERCIAL

## 2018-06-18 VITALS
HEIGHT: 61 IN | WEIGHT: 230 LBS | SYSTOLIC BLOOD PRESSURE: 140 MMHG | DIASTOLIC BLOOD PRESSURE: 85 MMHG | HEART RATE: 95 BPM | BODY MASS INDEX: 43.43 KG/M2

## 2018-06-18 DIAGNOSIS — Z09 FOLLOW-UP EXAMINATION FOLLOWING SURGERY: ICD-10-CM

## 2018-06-18 DIAGNOSIS — Z09 FOLLOW-UP EXAMINATION FOLLOWING SURGERY: Primary | ICD-10-CM

## 2018-06-18 PROCEDURE — 99024 POSTOP FOLLOW-UP VISIT: CPT | Mod: S$GLB,,,

## 2018-06-18 PROCEDURE — 99999 PR PBB SHADOW E&M-EST. PATIENT-LVL III: CPT | Mod: PBBFAC,,,

## 2018-06-18 NOTE — PROGRESS NOTES
Chief Complaint   Patient presents with    Follow-up     post op/ right foot        Patient returns status post right bunionectomy, 5th toe arthroplasty and orif subtalar joint  x 18  days.  Patient states doing well.  Patient's pain is well controlled with the pain medications.  Patient denies any nausea vomiting fever or chills or calf pain.  Patient has been heel  weightbearing with crutches.  Patient's  dressing is clean dry and intact.    PE:    Incision site looks well coapted.  The sutures are intact.  There is minimal swelling and no drainage.  There is no hematoma.  There is good capillary fill time to all digits.  No neurovascular compromise.    Assessment:  Doing well postoperatively.        Plan:   1. Continue any current medications.  Sutures were removed.  2. Wound was redressed ace wrap.  3. Patient to continue heel weightbearing with postoperative shoe.  4.  Return to clinic 14 days

## 2018-06-26 ENCOUNTER — PATIENT MESSAGE (OUTPATIENT)
Dept: PODIATRY | Facility: CLINIC | Age: 23
End: 2018-06-26

## 2018-07-02 ENCOUNTER — HOSPITAL ENCOUNTER (OUTPATIENT)
Dept: RADIOLOGY | Facility: HOSPITAL | Age: 23
Discharge: HOME | End: 2018-07-02
Payer: COMMERCIAL

## 2018-07-02 ENCOUNTER — OFFICE VISIT (OUTPATIENT)
Dept: PODIATRY | Facility: CLINIC | Age: 23
End: 2018-07-02
Payer: COMMERCIAL

## 2018-07-02 DIAGNOSIS — B07.0 PLANTAR WART: ICD-10-CM

## 2018-07-02 DIAGNOSIS — Z09 FOLLOW-UP EXAMINATION FOLLOWING SURGERY: ICD-10-CM

## 2018-07-02 DIAGNOSIS — Z09 FOLLOW-UP EXAMINATION FOLLOWING SURGERY: Primary | ICD-10-CM

## 2018-07-02 PROCEDURE — 73630 X-RAY EXAM OF FOOT: CPT | Mod: TC,PO,RT

## 2018-07-02 PROCEDURE — 73630 X-RAY EXAM OF FOOT: CPT | Mod: 26,RT,, | Performed by: RADIOLOGY

## 2018-07-02 PROCEDURE — 99999 PR PBB SHADOW E&M-EST. PATIENT-LVL II: CPT | Mod: PBBFAC,,,

## 2018-07-02 PROCEDURE — 17110 DESTRUCTION B9 LES UP TO 14: CPT | Mod: 79,LT,S$GLB,

## 2018-07-02 PROCEDURE — 99212 OFFICE O/P EST SF 10 MIN: CPT | Mod: 25,S$GLB,,

## 2018-07-02 NOTE — PROGRESS NOTES
Chief Complaint   Patient presents with    Follow-up     surgery right foot / dr jayashree yao        Patient returns status post right bunionectomy, 5th toe arthroplasty and orif subtalar joint  x  1 month.  Patient states doing well.  Patient's pain is well controlled with the pain medications.  Patient denies any nausea vomiting fever or chills or calf pain.  Patient has been heel  weightbearing with crutches.  Patient's  dressing is clean dry and intact.  Reporting new problem left plantar's warts.    ROS:  Constitution: Negative for chills, fever, weakness and malaise/fatigue.   HEENT: Negative for headaches.   Cardiovascular: Negative for chest pain and claudication.   Respiratory: Negative for cough and shortness of breath.   Musculoskeletal: Positive for foot pain.  Negative for muscle cramps and muscle weakness.   Gastrointestinal: Negative for nausea and vomiting.   Neurological: Negative for numbness and paresthesias.   Dermatological: Negative for wound.    PE:  Pedal pulses palpable  Incision site looks well coapted.  The sutures are intact.  There is minimal swelling and no drainage.  There is no hematoma.  There is good capillary fill time to all digits.  No neurovascular compromise.  Warts left lateral foot x 2  X-rays healing osteotomy, hardware intact    Assessment:  Doing well postoperatively.   Letf plantar wart x 2       Plan:   Pt referral, advance to flat foot weight bearing as tolerated.    Wart left foot: Discussion with patient regarding treatment options, etiology. Explained to patient that this will be a staged procedure as the lesion typically require multiple treatments to destroy.  Lesion was pared with a 15 blade to   Ablation was achieved with candida antigen injections along with the aperture pads, moleskin and dry sterile dressing. Patient was encouraged to keep the area clean and dry and stay off that area such as possible.  Compund W daily.    RTC 1 month.

## 2018-07-07 ENCOUNTER — PATIENT MESSAGE (OUTPATIENT)
Dept: PODIATRY | Facility: CLINIC | Age: 23
End: 2018-07-07

## 2018-07-08 ENCOUNTER — TELEPHONE (OUTPATIENT)
Dept: PODIATRY | Facility: HOSPITAL | Age: 23
End: 2018-07-08

## 2018-07-08 NOTE — TELEPHONE ENCOUNTER
Mother of patient called saying patiens post operative 5th toe appeared infected and was requesting antibiotics.  She was advised to go to the ER to be evaluated and receive treatment.  She agreed to do so.  A message was sent to Dr. Obrien's staff to move up her next appointment.

## 2018-07-31 ENCOUNTER — OFFICE VISIT (OUTPATIENT)
Dept: PODIATRY | Facility: CLINIC | Age: 23
End: 2018-07-31
Payer: COMMERCIAL

## 2018-07-31 ENCOUNTER — APPOINTMENT (OUTPATIENT)
Dept: RADIOLOGY | Facility: OTHER | Age: 23
End: 2018-07-31
Payer: COMMERCIAL

## 2018-07-31 VITALS
HEART RATE: 110 BPM | SYSTOLIC BLOOD PRESSURE: 129 MMHG | WEIGHT: 230 LBS | DIASTOLIC BLOOD PRESSURE: 68 MMHG | HEIGHT: 61 IN | BODY MASS INDEX: 43.43 KG/M2

## 2018-07-31 DIAGNOSIS — Z09 FOLLOW-UP EXAMINATION FOLLOWING SURGERY: Primary | ICD-10-CM

## 2018-07-31 DIAGNOSIS — B07.0 PLANTAR WART: ICD-10-CM

## 2018-07-31 DIAGNOSIS — Z09 FOLLOW-UP EXAMINATION FOLLOWING SURGERY: ICD-10-CM

## 2018-07-31 PROCEDURE — 99999 PR PBB SHADOW E&M-EST. PATIENT-LVL III: CPT | Mod: PBBFAC,,,

## 2018-07-31 PROCEDURE — 3008F BODY MASS INDEX DOCD: CPT | Mod: CPTII,S$GLB,,

## 2018-07-31 PROCEDURE — 73630 X-RAY EXAM OF FOOT: CPT | Mod: 26,RT,, | Performed by: RADIOLOGY

## 2018-07-31 PROCEDURE — 99213 OFFICE O/P EST LOW 20 MIN: CPT | Mod: 24,25,S$GLB,

## 2018-07-31 PROCEDURE — 17110 DESTRUCTION B9 LES UP TO 14: CPT | Mod: 79,S$GLB,,

## 2018-07-31 PROCEDURE — 73630 X-RAY EXAM OF FOOT: CPT | Mod: TC,RT

## 2018-07-31 NOTE — PROGRESS NOTES
Chief Complaint   Patient presents with    Post-op Evaluation     Right foot    Plantar Warts     Bottom Left Great Toe       Patient returns status post right bunionectomy, 5th toe arthroplasty and orif subtalar joint  x 2  months.  Patient states doing well.  Patient's pain is well controlled with the pain medications.  Patient denies any nausea vomiting fever or chills or calf pain.  Patient has been heel  weightbearing with crutches.  Patient's  dressing is clean dry and intact.  Reporting recurrence left plantar's warts.    ROS:  Constitution: Negative for chills, fever, weakness and malaise/fatigue.   HEENT: Negative for headaches.   Cardiovascular: Negative for chest pain and claudication.   Respiratory: Negative for cough and shortness of breath.   Musculoskeletal: Positive for foot pain.  Negative for muscle cramps and muscle weakness.   Gastrointestinal: Negative for nausea and vomiting.   Neurological: Negative for numbness and paresthesias.   Dermatological: Negative for wound.    PE:  Pedal pulses palpable  Incision site looks well coapted.  The sutures are intact.  There is minimal swelling and no drainage.  There is no hematoma.  There is good capillary fill time to all digits.  No neurovascular compromise.  Warts left lateral foot x 3  X-rays healing osteotomy, hardware intact    Assessment:  Doing well postoperatively.   Letf plantar wart x3       Plan:   Weight bear as tolerated.   Wart left foot: Discussion with patient regarding treatment options, etiology. Explained to patient that this will be a staged procedure as the lesion typically require multiple treatments to destroy.  Lesion was pared with a 15 blade to   Ablation was achieved with candida antigen injections along with the aperture pads, moleskin and dry sterile dressing. Patient was encouraged to keep the area clean and dry and stay off that area such as possible.  Compund W daily.    RTC 2 weeks.

## 2018-08-01 ENCOUNTER — TELEPHONE (OUTPATIENT)
Dept: PODIATRY | Facility: CLINIC | Age: 23
End: 2018-08-01

## 2018-08-01 NOTE — TELEPHONE ENCOUNTER
----- Message from Lm Obrien DPM sent at 7/31/2018  6:17 PM CDT -----  X-rays look good, proceed with weight bearing as tolerated in inserts and tennis shoes    Left voicemail for patient to call office for results.

## 2018-08-06 ENCOUNTER — OFFICE VISIT (OUTPATIENT)
Dept: FAMILY MEDICINE | Facility: CLINIC | Age: 23
End: 2018-08-06
Payer: COMMERCIAL

## 2018-08-06 VITALS
TEMPERATURE: 99 F | HEIGHT: 61 IN | DIASTOLIC BLOOD PRESSURE: 78 MMHG | SYSTOLIC BLOOD PRESSURE: 122 MMHG | BODY MASS INDEX: 46.36 KG/M2 | HEART RATE: 99 BPM | WEIGHT: 245.56 LBS | OXYGEN SATURATION: 99 %

## 2018-08-06 DIAGNOSIS — G40.802 OTHER EPILEPSY WITHOUT STATUS EPILEPTICUS, NOT INTRACTABLE: ICD-10-CM

## 2018-08-06 DIAGNOSIS — R23.2 HOT FLASHES NOT DUE TO MENOPAUSE: Primary | ICD-10-CM

## 2018-08-06 PROCEDURE — 3008F BODY MASS INDEX DOCD: CPT | Mod: CPTII,S$GLB,, | Performed by: FAMILY MEDICINE

## 2018-08-06 PROCEDURE — 99214 OFFICE O/P EST MOD 30 MIN: CPT | Mod: S$GLB,,, | Performed by: FAMILY MEDICINE

## 2018-08-06 PROCEDURE — 99999 PR PBB SHADOW E&M-EST. PATIENT-LVL III: CPT | Mod: PBBFAC,,, | Performed by: FAMILY MEDICINE

## 2018-08-06 NOTE — PROGRESS NOTES
" THIS DOCUMENT WAS MADE IN PART WITH VOICE RECOGNITION SOFTWARE.  OCCASIONALLY THIS SOFTWARE WILL MISINTERPRET WORDS OR PHRASES.      Terra Hamilton  1995    Subjective     Chief Complaint   Patient presents with    Hot Flashes     pt states she has been having "really bad hot flashes" for about 2 months, additionally pt is concerned with wt gain, she states is r/t PCOS       HPI    She is here today with her mother concerned about hot flashes.  Began in June, 'bad'  But has had before just not bad  Changes:  Surgery on foot, bunion and 'implants', titanium.  But no medication changes in the last few months.  30 lb weight gain, feels from immobility, diet has not changed  fam hx of Graves  About 2-3 x week , random times, but most in afternoon, has had few at night  No fever, no chills, no abdominal pain, no rashes the    Has been drinking large amount of water can drink up to a 12 pack of bottled water a day always thirsty but mouth not specifically dry.  Does not feel that she is urinating excessively.  Urine is yellow in the morning and then clear throughout the day.    No vitamin supplements or other.  Everything she is taking is on her med card.    She is on OCPs.  Her periods have been fairly regular though she states the blood sometimes looks cold.  Denies any risk of pregnancy.  Six    Other epilepsy without status epilepticus, not intractable  Stable she has not had a seizure in 5-6 years.  She does follow with Neurology      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/04/2017    Hallux valgus of right foot 06/01/2018     Resolved Ambulatory Problems     Diagnosis Date Noted    No Resolved Ambulatory Problems     Past Medical " History:   Diagnosis Date    Focal seizures     History of polycystic ovarian disease     Seizures     Speech delay          Review of Systems   Constitutional: Positive for activity change and unexpected weight change. Negative for fever.   HENT: Positive for congestion (after eating) and rhinorrhea (after eating).    Eyes: Negative for visual disturbance.   Respiratory: Negative for shortness of breath and wheezing.    Cardiovascular: Negative for chest pain.   Gastrointestinal: Positive for constipation (improved on Amitiza, began in dec 2017). Negative for blood in stool, diarrhea, nausea and vomiting.   Endocrine: Positive for polydipsia. Negative for polyuria.   Genitourinary: Negative for difficulty urinating and frequency.   Musculoskeletal: Negative.    Skin:        Plantar warts       Objective     Physical Exam   Constitutional: She is oriented to person, place, and time. She appears well-developed and well-nourished.  Non-toxic appearance. No distress.   HENT:   Head: Normocephalic and atraumatic.   Right Ear: Tympanic membrane, external ear and ear canal normal.   Left Ear: Tympanic membrane, external ear and ear canal normal.   Nose: Nose normal.   Mouth/Throat: Oropharynx is clear and moist. No oropharyngeal exudate.   Eyes: Conjunctivae and EOM are normal. Pupils are equal, round, and reactive to light. No scleral icterus.   Neck: Normal range of motion. Neck supple. No JVD present. No tracheal deviation present. No thyromegaly (Thyroid is not enlarged and nontender.) present.   Cardiovascular: Normal rate, regular rhythm, normal heart sounds and intact distal pulses.  PMI is not displaced.  Exam reveals no gallop and no friction rub.    No murmur heard.  Pulmonary/Chest: Breath sounds normal. No respiratory distress. She has no wheezes. She has no rales.   Abdominal: Soft. Bowel sounds are normal. She exhibits no distension and no mass. There is no tenderness. There is no rebound and no  "guarding.   Musculoskeletal: She exhibits no edema.   Lymphadenopathy:        Head (right side): No submental, no submandibular, no preauricular, no posterior auricular and no occipital adenopathy present.        Head (left side): No submental, no submandibular, no preauricular, no posterior auricular and no occipital adenopathy present.     She has no cervical adenopathy.        Right: No supraclavicular and no epitrochlear adenopathy present.        Left: No supraclavicular and no epitrochlear adenopathy present.   Neurological: She is alert and oriented to person, place, and time. She displays normal reflexes. No cranial nerve deficit. She exhibits normal muscle tone.   Skin: Capillary refill takes less than 2 seconds. No rash noted. No pallor.   Psychiatric: She has a normal mood and affect. Her behavior is normal.   Vitals reviewed.    Vitals:    08/06/18 0916   BP: 122/78   BP Location: Left arm   Patient Position: Sitting   BP Method: Medium (Manual)   Pulse: 99   Temp: 99 °F (37.2 °C)   TempSrc: Oral   SpO2: 99%   Weight: 111.4 kg (245 lb 9.5 oz)   Height: 5' 1" (1.549 m)       MOST RECENT LABS IN OUR ELECTRONIC MEDICAL RECORD:     Results for orders placed or performed during the hospital encounter of 06/01/18   POCT urine pregnancy   Result Value Ref Range    POC Preg Test, Ur Negative Negative     Acceptable Yes          Terra was seen today for hot flashes.    Diagnoses and all orders for this visit:    Hot flashes not due to menopause  -     Comprehensive metabolic panel; Future  -     TSH; Future  -     CBC auto differential; Future  -     Hemoglobin A1c; Future  -     Lipid panel; Future  -     T3, free; Future  -     T4, free; Future  -     Urinalysis; Future  -     PREGNANCY TEST, URINE RAPID    I have asked her to keep a diary of these episodes.  How often, characteristics, timing, etc.  She will return in 4 weeks and will review her labs and these findings.  If there are any " changes she will let me know.  I also asked her to monitor her temperature periodically.    Other epilepsy without status epilepticus, not intractable  This appears stable.  There has been no recent medication changes to coincide with her symptoms.

## 2018-08-07 ENCOUNTER — LAB VISIT (OUTPATIENT)
Dept: LAB | Facility: HOSPITAL | Age: 23
End: 2018-08-07
Attending: FAMILY MEDICINE
Payer: COMMERCIAL

## 2018-08-07 DIAGNOSIS — R23.2 HOT FLASHES NOT DUE TO MENOPAUSE: ICD-10-CM

## 2018-08-07 LAB
ALBUMIN SERPL BCP-MCNC: 3.5 G/DL
ALP SERPL-CCNC: 63 U/L
ALT SERPL W/O P-5'-P-CCNC: 22 U/L
ANION GAP SERPL CALC-SCNC: 9 MMOL/L
AST SERPL-CCNC: 19 U/L
BASOPHILS # BLD AUTO: 0.03 K/UL
BASOPHILS NFR BLD: 0.3 %
BILIRUB SERPL-MCNC: 0.4 MG/DL
BUN SERPL-MCNC: 9 MG/DL
CALCIUM SERPL-MCNC: 9.3 MG/DL
CHLORIDE SERPL-SCNC: 106 MMOL/L
CHOLEST SERPL-MCNC: 226 MG/DL
CHOLEST/HDLC SERPL: 4.2 {RATIO}
CO2 SERPL-SCNC: 24 MMOL/L
CREAT SERPL-MCNC: 0.8 MG/DL
DIFFERENTIAL METHOD: ABNORMAL
EOSINOPHIL # BLD AUTO: 0.1 K/UL
EOSINOPHIL NFR BLD: 0.7 %
ERYTHROCYTE [DISTWIDTH] IN BLOOD BY AUTOMATED COUNT: 12.4 %
EST. GFR  (AFRICAN AMERICAN): >60 ML/MIN/1.73 M^2
EST. GFR  (NON AFRICAN AMERICAN): >60 ML/MIN/1.73 M^2
ESTIMATED AVG GLUCOSE: 82 MG/DL
GLUCOSE SERPL-MCNC: 83 MG/DL
HBA1C MFR BLD HPLC: 4.5 %
HCT VFR BLD AUTO: 41.3 %
HDLC SERPL-MCNC: 54 MG/DL
HDLC SERPL: 23.9 %
HGB BLD-MCNC: 13.7 G/DL
IMM GRANULOCYTES # BLD AUTO: 0.01 K/UL
IMM GRANULOCYTES NFR BLD AUTO: 0.1 %
LDLC SERPL CALC-MCNC: 147.2 MG/DL
LYMPHOCYTES # BLD AUTO: 2.2 K/UL
LYMPHOCYTES NFR BLD: 25.1 %
MCH RBC QN AUTO: 31.5 PG
MCHC RBC AUTO-ENTMCNC: 33.2 G/DL
MCV RBC AUTO: 95 FL
MONOCYTES # BLD AUTO: 0.4 K/UL
MONOCYTES NFR BLD: 4.5 %
NEUTROPHILS # BLD AUTO: 6.1 K/UL
NEUTROPHILS NFR BLD: 69.3 %
NONHDLC SERPL-MCNC: 172 MG/DL
NRBC BLD-RTO: 0 /100 WBC
PLATELET # BLD AUTO: 203 K/UL
PMV BLD AUTO: 11.7 FL
POTASSIUM SERPL-SCNC: 3.7 MMOL/L
PROT SERPL-MCNC: 7 G/DL
RBC # BLD AUTO: 4.35 M/UL
SODIUM SERPL-SCNC: 139 MMOL/L
T3FREE SERPL-MCNC: 3 PG/ML
T4 FREE SERPL-MCNC: 1.07 NG/DL
TRIGL SERPL-MCNC: 124 MG/DL
TSH SERPL DL<=0.005 MIU/L-ACNC: 1 UIU/ML
WBC # BLD AUTO: 8.73 K/UL

## 2018-08-07 PROCEDURE — 84481 FREE ASSAY (FT-3): CPT

## 2018-08-07 PROCEDURE — 80061 LIPID PANEL: CPT

## 2018-08-07 PROCEDURE — 36415 COLL VENOUS BLD VENIPUNCTURE: CPT | Mod: PN

## 2018-08-07 PROCEDURE — 84443 ASSAY THYROID STIM HORMONE: CPT

## 2018-08-07 PROCEDURE — 83036 HEMOGLOBIN GLYCOSYLATED A1C: CPT

## 2018-08-07 PROCEDURE — 80053 COMPREHEN METABOLIC PANEL: CPT

## 2018-08-07 PROCEDURE — 84439 ASSAY OF FREE THYROXINE: CPT

## 2018-08-07 PROCEDURE — 85025 COMPLETE CBC W/AUTO DIFF WBC: CPT

## 2018-08-12 ENCOUNTER — TELEPHONE (OUTPATIENT)
Dept: FAMILY MEDICINE | Facility: CLINIC | Age: 23
End: 2018-08-12

## 2018-08-12 DIAGNOSIS — R82.90 ABNORMAL FINDING IN URINE: Primary | ICD-10-CM

## 2018-08-12 NOTE — TELEPHONE ENCOUNTER
Please call with UA. There was blood in the urine. I would like for her to repeat a urinalysis along with a culture before she returns in a few weeks. Also please make certain she did not collect the urine during her menstrual cycle,  And make certain it has completely resolved before repeating. Also please make certain she is instructed on a mid stream clean catch.

## 2018-08-13 NOTE — TELEPHONE ENCOUNTER
----- Message from Courtney Diego sent at 8/13/2018 11:32 AM CDT -----  Type:  Patient Returning Call    Who Called:  Patient  Who Left Message for Patient:  Jocelin  Does the patient know what this is regarding?:  yes  Best Call Back Number:  [phone] (home)     Additional Information:  Na

## 2018-08-14 NOTE — TELEPHONE ENCOUNTER
Spoke with pt, she states she will be back in town from school on Labor day weekend and has an appt on 9/4 and will collect urine specimen at that time.  Pt was instructed on mid stream clean catch.  Pt verbalized understanding.

## 2018-09-04 ENCOUNTER — OFFICE VISIT (OUTPATIENT)
Dept: FAMILY MEDICINE | Facility: CLINIC | Age: 23
End: 2018-09-04
Payer: COMMERCIAL

## 2018-09-04 ENCOUNTER — LAB VISIT (OUTPATIENT)
Dept: LAB | Facility: HOSPITAL | Age: 23
End: 2018-09-04
Attending: FAMILY MEDICINE
Payer: COMMERCIAL

## 2018-09-04 VITALS
SYSTOLIC BLOOD PRESSURE: 90 MMHG | DIASTOLIC BLOOD PRESSURE: 60 MMHG | HEART RATE: 88 BPM | WEIGHT: 241.06 LBS | TEMPERATURE: 99 F | HEIGHT: 61 IN | BODY MASS INDEX: 45.51 KG/M2

## 2018-09-04 DIAGNOSIS — G40.909 SEIZURE DISORDER: ICD-10-CM

## 2018-09-04 DIAGNOSIS — N39.0 URINARY TRACT INFECTION WITH HEMATURIA, SITE UNSPECIFIED: ICD-10-CM

## 2018-09-04 DIAGNOSIS — R82.90 ABNORMAL FINDING IN URINE: ICD-10-CM

## 2018-09-04 DIAGNOSIS — E78.00 ELEVATED CHOLESTEROL: ICD-10-CM

## 2018-09-04 DIAGNOSIS — R31.9 URINARY TRACT INFECTION WITH HEMATURIA, SITE UNSPECIFIED: ICD-10-CM

## 2018-09-04 DIAGNOSIS — R23.2 HOT FLASHES NOT DUE TO MENOPAUSE: Primary | ICD-10-CM

## 2018-09-04 LAB
BACTERIA #/AREA URNS AUTO: ABNORMAL /HPF
HYALINE CASTS UR QL AUTO: 2 /LPF
MICROSCOPIC COMMENT: ABNORMAL
RBC #/AREA URNS AUTO: 2 /HPF (ref 0–4)
SQUAMOUS #/AREA URNS AUTO: 1 /HPF
WBC #/AREA URNS AUTO: 51 /HPF (ref 0–5)
WBC CLUMPS UR QL AUTO: ABNORMAL
YEAST UR QL AUTO: ABNORMAL

## 2018-09-04 PROCEDURE — 99214 OFFICE O/P EST MOD 30 MIN: CPT | Mod: S$GLB,,, | Performed by: FAMILY MEDICINE

## 2018-09-04 PROCEDURE — 3008F BODY MASS INDEX DOCD: CPT | Mod: CPTII,S$GLB,, | Performed by: FAMILY MEDICINE

## 2018-09-04 PROCEDURE — 80175 DRUG SCREEN QUAN LAMOTRIGINE: CPT

## 2018-09-04 PROCEDURE — 80168 ASSAY OF ETHOSUXIMIDE: CPT

## 2018-09-04 PROCEDURE — 81001 URINALYSIS AUTO W/SCOPE: CPT

## 2018-09-04 PROCEDURE — 87086 URINE CULTURE/COLONY COUNT: CPT

## 2018-09-04 PROCEDURE — 99999 PR PBB SHADOW E&M-EST. PATIENT-LVL IV: CPT | Mod: PBBFAC,,, | Performed by: FAMILY MEDICINE

## 2018-09-04 NOTE — PROGRESS NOTES
THIS DOCUMENT WAS MADE IN PART WITH VOICE RECOGNITION SOFTWARE.  OCCASIONALLY THIS SOFTWARE WILL MISINTERPRET WORDS OR PHRASES.      Terra Hamilton  1995    Terra was seen today for follow-up.    Diagnoses and all orders for this visit:    Hot flashes not due to menopause   they seem to be reducing over the last 2 weeks.  I do not detect anything significantly abnormal on exam or on labs.  But I have asked her to continue to monitor and also to make certain she is not running a fever.    Abnormal finding in urine    If hematuria has not resolved then may need imaging of the kidneys    Elevated cholesterol   mildly elevated I recommend a healthy low-fat diet and exercise    Seizure disorder  -     ETHOSUXIMIDE LEVEL; Future  -     LAMOTRIGINE LEVEL; Future   her seizures are managed by Neurology but no recent levels.  With above symptoms I would like to make certain the levels are not elevated which could potentially be contributing to the above symptoms    Urinary tract infection with hematuria, site unspecified  -     Urinalysis Microscopic; Future  -     Urine culture; Future  -     Urine culture  -     Urinalysis Microscopic        Subjective     Chief Complaint   Patient presents with    Follow-up     4 week follow up        HPI     follow up, recall that she presented with reports of hot flashes. Labs ordered, nothing really explains her symptoms.  Last episode 8/23.     Incidental findings included micro hematuria. Repeat urine has been ordered to account for possible menstrual cycle but was not completed prior to this appointment.   Did develop dysuria, treated for UTI.     her total cholesterol is mildly elevated. Today will discuss healthy diet and exercise going forward. Consider repeating 1-2 years    Began diet plan, [employer]      HPI elements addressed above in the assessment and plan including problems, diagnosis, stability/instability,  improving/worsening, and chronicity will not be  duplicated in this section. Any important additional HPI topics will be discussed here if needed.    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/04/2017    Hallux valgus of right foot 06/01/2018     Resolved Ambulatory Problems     Diagnosis Date Noted    No Resolved Ambulatory Problems     Past Medical History:   Diagnosis Date    Focal seizures     History of polycystic ovarian disease     Seizures     Speech delay          Review of Systems   Constitutional: Negative for fever and unexpected weight change.   HENT: Positive for congestion and postnasal drip.         Recent sinusitis,  Improved now     Endocrine: Negative for polydipsia and polyuria.   Genitourinary:        Dysuria and frequency has resolved.   Musculoskeletal: Negative.        Objective     Physical Exam   Constitutional: She is oriented to person, place, and time. She appears well-developed and well-nourished.   HENT:   Head: Normocephalic and atraumatic.   Right Ear: Tympanic membrane, external ear and ear canal normal.   Left Ear: Tympanic membrane, external ear and ear canal normal.   Nose: Nose normal.   Mouth/Throat: Oropharynx is clear and moist. No oropharyngeal exudate.   Eyes: Conjunctivae and EOM are normal. Pupils are equal, round, and reactive to light. Right eye exhibits no discharge. Left eye exhibits no discharge. No scleral icterus.   Neck: Neck supple. No JVD present. No thyromegaly present.   Cardiovascular: Normal rate, regular rhythm and normal heart sounds. Exam reveals no friction rub.   No murmur heard.  Pulmonary/Chest: Effort normal and breath sounds normal. No respiratory distress. She has no wheezes. She has no rales. She exhibits no tenderness.  "  Abdominal: Soft. Bowel sounds are normal. She exhibits no distension and no mass. There is no tenderness. There is no guarding.   Lymphadenopathy:     She has no cervical adenopathy.   Neurological: She is alert and oriented to person, place, and time. No cranial nerve deficit. Coordination normal.   Skin: She is not diaphoretic.   Psychiatric: She has a normal mood and affect. Her behavior is normal.   Vitals reviewed.    Vitals:    09/04/18 0850   BP: 90/60   BP Location: Right arm   Patient Position: Sitting   BP Method: Medium (Manual)   Pulse: 88   Temp: 98.5 °F (36.9 °C)   TempSrc: Oral   Weight: 109.4 kg (241 lb 1.2 oz)   Height: 5' 1" (1.549 m)       MOST RECENT LABS IN OUR ELECTRONIC MEDICAL RECORD:     Results for orders placed or performed in visit on 08/07/18   Urinalysis   Result Value Ref Range    Specimen UA Urine, Unspecified     Color, UA Yellow Yellow, Straw, Emily    Appearance, UA Cloudy (A) Clear    pH, UA 6.0 5.0 - 8.0    Specific Gravity, UA 1.010 1.005 - 1.030    Protein, UA Negative Negative    Glucose, UA Negative Negative    Ketones, UA Negative Negative    Bilirubin (UA) Negative Negative    Occult Blood UA 2+ (A) Negative    Nitrite, UA Negative Negative    Urobilinogen, UA Negative <2.0 EU/dL    Leukocytes, UA Negative Negative   Urinalysis Microscopic   Result Value Ref Range    RBC, UA 33 (H) 0 - 4 /hpf    WBC, UA 1 0 - 5 /hpf    Bacteria, UA Many (A) None-Occ /hpf    Yeast, UA Few (A) None    Squam Epithel, UA 3 /hpf    Hyaline Casts, UA 1 0-1/lpf /lpf    Microscopic Comment SEE COMMENT          "

## 2018-09-05 ENCOUNTER — PATIENT MESSAGE (OUTPATIENT)
Dept: FAMILY MEDICINE | Facility: CLINIC | Age: 23
End: 2018-09-05

## 2018-09-06 ENCOUNTER — TELEPHONE (OUTPATIENT)
Dept: FAMILY MEDICINE | Facility: CLINIC | Age: 23
End: 2018-09-06

## 2018-09-06 DIAGNOSIS — N39.0 URINARY TRACT INFECTION WITH HEMATURIA, SITE UNSPECIFIED: Primary | ICD-10-CM

## 2018-09-06 DIAGNOSIS — R31.9 URINARY TRACT INFECTION WITH HEMATURIA, SITE UNSPECIFIED: Primary | ICD-10-CM

## 2018-09-06 LAB
BACTERIA UR CULT: NORMAL
ETHOSUXIMIDE SERPL-MCNC: 22 MCG/ML
LAMOTRIGINE SERPL-MCNC: 3.6 UG/ML (ref 2–15)

## 2018-09-06 NOTE — TELEPHONE ENCOUNTER
Spoke with pt and advised on test results per Dr. Nava, also advised she could collect specimen in [place] if she needed while in school in about a week.  Pt verbalized understanding.

## 2018-09-06 NOTE — TELEPHONE ENCOUNTER
Please call regarding urine culture. .  Her culture was negative but she had a lot of white blood cells and clumps.  So I would like to repeat the urine in about a week.  She is at school in [place] so she may wish to collect this there.  If she has developed any new symptoms then let me know sooner.

## 2018-09-06 NOTE — TELEPHONE ENCOUNTER
----- Message from Nicole Velasquez sent at 9/6/2018 10:05 AM CDT -----  Contact: Patient  Type:  Patient Returning Call    Who Called:  Patient  Who Left Message for Patient:  Nurse   Does the patient know what this is regarding?:  Test results  Best Call Back Number:   Additional Information:  Call to pod. No answer.

## 2018-09-06 NOTE — TELEPHONE ENCOUNTER
Patient was notified of results and denies any dysuria. States that she is only experiencing frequency. States that she will go to [place] and do urine in one week.

## 2018-09-12 ENCOUNTER — LAB VISIT (OUTPATIENT)
Dept: LAB | Facility: HOSPITAL | Age: 23
End: 2018-09-12
Attending: FAMILY MEDICINE
Payer: COMMERCIAL

## 2018-09-12 DIAGNOSIS — R31.9 URINARY TRACT INFECTION WITH HEMATURIA, SITE UNSPECIFIED: ICD-10-CM

## 2018-09-12 DIAGNOSIS — N39.0 URINARY TRACT INFECTION WITH HEMATURIA, SITE UNSPECIFIED: ICD-10-CM

## 2018-09-12 LAB
BACTERIA #/AREA URNS HPF: ABNORMAL /HPF
BILIRUB UR QL STRIP: NEGATIVE
CLARITY UR: CLEAR
COLOR UR: YELLOW
GLUCOSE UR QL STRIP: NEGATIVE
HGB UR QL STRIP: NEGATIVE
KETONES UR QL STRIP: NEGATIVE
LEUKOCYTE ESTERASE UR QL STRIP: NEGATIVE
MICROSCOPIC COMMENT: ABNORMAL
NITRITE UR QL STRIP: NEGATIVE
PH UR STRIP: 7 [PH] (ref 5–8)
PROT UR QL STRIP: NEGATIVE
SP GR UR STRIP: <=1.005 (ref 1–1.03)
URN SPEC COLLECT METH UR: ABNORMAL
WBC #/AREA URNS HPF: 3 /HPF (ref 0–5)

## 2018-09-12 PROCEDURE — 87086 URINE CULTURE/COLONY COUNT: CPT

## 2018-09-12 PROCEDURE — 87088 URINE BACTERIA CULTURE: CPT

## 2018-09-12 PROCEDURE — 87147 CULTURE TYPE IMMUNOLOGIC: CPT

## 2018-09-12 PROCEDURE — 81000 URINALYSIS NONAUTO W/SCOPE: CPT | Mod: PO

## 2018-09-14 LAB — BACTERIA UR CULT: NORMAL

## 2018-09-16 ENCOUNTER — TELEPHONE (OUTPATIENT)
Dept: FAMILY MEDICINE | Facility: CLINIC | Age: 23
End: 2018-09-16

## 2018-09-16 DIAGNOSIS — R82.71 GROUP B STREPTOCOCCAL BACTERIURIA: ICD-10-CM

## 2018-09-16 DIAGNOSIS — N39.0 URINARY TRACT INFECTION WITHOUT HEMATURIA, SITE UNSPECIFIED: Primary | ICD-10-CM

## 2018-09-16 RX ORDER — AMOXICILLIN 875 MG/1
875 TABLET, FILM COATED ORAL 2 TIMES DAILY
Qty: 20 TABLET | Refills: 0 | Status: SHIPPED | OUTPATIENT
Start: 2018-09-16 | End: 2018-09-26

## 2018-09-16 NOTE — TELEPHONE ENCOUNTER
See results review message. I sent in antibiotic though I don't actually think she has a UTI. But she does appear to have colonization with group B strep so it is probably reasonable to treat as a precaution. Just verify that she has started this and doesn't have any questions.

## 2018-09-18 NOTE — TELEPHONE ENCOUNTER
----- Message from Tamanna Nolen sent at 9/18/2018  9:27 AM CDT -----  Type:  Patient Returning Call    Who Called:  Patient  Who Left Message for Patient:  DOMO LOZADA  Does the patient know what this is regarding?:  No  Best Call Back Number:  [phone]

## 2018-09-18 NOTE — TELEPHONE ENCOUNTER
Spoke to patient and reviewed previous message from Dr. Nava. Patient verbalized understanding and states no questions at this time

## 2018-11-21 ENCOUNTER — OFFICE VISIT (OUTPATIENT)
Dept: PODIATRY | Facility: CLINIC | Age: 23
End: 2018-11-21
Payer: COMMERCIAL

## 2018-11-21 VITALS — HEIGHT: 61 IN | BODY MASS INDEX: 45.5 KG/M2 | WEIGHT: 241 LBS

## 2018-11-21 DIAGNOSIS — Z79.899 ENCOUNTER FOR LONG-TERM (CURRENT) USE OF HIGH-RISK MEDICATION: ICD-10-CM

## 2018-11-21 DIAGNOSIS — B07.0 PLANTAR WART: Primary | ICD-10-CM

## 2018-11-21 PROCEDURE — 17110 DESTRUCTION B9 LES UP TO 14: CPT | Mod: S$GLB,,,

## 2018-11-21 PROCEDURE — 99999 PR PBB SHADOW E&M-EST. PATIENT-LVL II: CPT | Mod: PBBFAC,,,

## 2018-11-21 PROCEDURE — 3008F BODY MASS INDEX DOCD: CPT | Mod: CPTII,S$GLB,,

## 2018-11-21 PROCEDURE — 99213 OFFICE O/P EST LOW 20 MIN: CPT | Mod: 25,S$GLB,,

## 2018-11-21 RX ORDER — NORGESTIMATE AND ETHINYL ESTRADIOL 7DAYSX3 LO
1 KIT ORAL DAILY
Qty: 28 TABLET | Refills: 2 | Status: SHIPPED | OUTPATIENT
Start: 2018-11-21 | End: 2018-12-28 | Stop reason: SDUPTHER

## 2018-11-21 NOTE — PROGRESS NOTES
Subjective:       Chief Complaint: Callouses (Lt foot )    HPI:     Patient presents to clinic reporting a painful lesion on the plantar aspect of the  Left foot.  Patient says it hurts when  weightbears.  Patient has tried some over-the-counter callus pads and acids and this has not worked.  Pain is described as sharp and has been there for several months.      Review of Systems:    Vascular : negative for edema and bruising  Respiratory: negative for cough or shortness of breath  Musculoskeletal: negative for joint pain and edema  Skin: negative for rashes, open wounds, + lesion plantar foot  Neurological: negative for burning, tingling and numbness  All other negative.        Objective:      Physical Exam      Constitutional:  Patient is oriented to person, place, and time. Vital signs are normal.  Appears well-developed and well-nourished.     Vascular:  Dorsalis pedis pulses are 2+ on the right side, and 2+ on the left side.   Posterior tibial pulses are 2+ on the right side, and 2+ on the left side.   + digital hair growth, no swelling, capillary fill time to all toes <3 seconds    Skin/Dermatological:  Skin is warm and intact. No rash noted. No cyanosis or clubbing. No open wounds. Verrucous lesion left sub 3,4 and lateral 5th MTH, tender with lateral compression, no signs of infection or drainage.      Musculoskeletal:      Normal range of motion bilateral ankle and pedal joints, no swelling or deformity, no tenderness or crepitus. Achilles tendon exhibits no pain or defects.        Neurological:  No deficits in 2 point tactile discrimination, vibratory, light touch or pressure.       Assessment:        Encounter Diagnosis   Name Primary?    Plantar wart Yes           Plan:     Discussion with patient regarding treatment options, etiology. Explained to patient that this will be a staged procedure as the lesion typically require multiple treatments to destroy.  Lesion was sharply pared with a 15 blade.   Patient tolerated well. 0.3 cc of candida antigen injected into lesions.  Patient was encouraged to keep the area clean and dry and stay off that area such as possible. Topical Compound W daily. Tylenol p.r.n. for pain.  RTC 1 week.

## 2018-11-29 ENCOUNTER — OFFICE VISIT (OUTPATIENT)
Dept: PODIATRY | Facility: CLINIC | Age: 23
End: 2018-11-29
Payer: COMMERCIAL

## 2018-11-29 VITALS
DIASTOLIC BLOOD PRESSURE: 79 MMHG | WEIGHT: 241 LBS | BODY MASS INDEX: 45.5 KG/M2 | HEIGHT: 61 IN | SYSTOLIC BLOOD PRESSURE: 125 MMHG | HEART RATE: 92 BPM

## 2018-11-29 DIAGNOSIS — B07.0 PLANTAR WART: Primary | ICD-10-CM

## 2018-11-29 PROCEDURE — 17110 DESTRUCTION B9 LES UP TO 14: CPT | Mod: 58,S$GLB,,

## 2018-11-29 PROCEDURE — 99499 UNLISTED E&M SERVICE: CPT | Mod: S$GLB,,,

## 2018-11-29 PROCEDURE — 99999 PR PBB SHADOW E&M-EST. PATIENT-LVL III: CPT | Mod: PBBFAC,,,

## 2018-11-29 NOTE — PROGRESS NOTES
Subjective:       Chief Complaint: Wart (Lt foot )    HPI:     Patient presents to clinic reporting a painful lesion on the plantar aspect of the  Left foot.  Patient says it hurts when  weightbears.  Patient has tried some over-the-counter callus pads and acids and this has not worked.  Pain is described as sharp and has been there for several months.      Review of Systems:    Vascular : negative for edema and bruising  Respiratory: negative for cough or shortness of breath  Musculoskeletal: negative for joint pain and edema  Skin: negative for rashes, open wounds, + lesion plantar foot  Neurological: negative for burning, tingling and numbness  All other negative.        Objective:      Physical Exam      Constitutional:  Patient is oriented to person, place, and time. Vital signs are normal.  Appears well-developed and well-nourished.     Vascular:  Dorsalis pedis pulses are 2+ on the right side, and 2+ on the left side.   Posterior tibial pulses are 2+ on the right side, and 2+ on the left side.   + digital hair growth, no swelling, capillary fill time to all toes <3 seconds    Skin/Dermatological:  Skin is warm and intact. No rash noted. No cyanosis or clubbing. No open wounds. Verrucous lesion left sub 3,4 and lateral 5th MTH, tender with lateral compression, no signs of infection or drainage, some moderate improvement.      Musculoskeletal:      Normal range of motion bilateral ankle and pedal joints, no swelling or deformity, no tenderness or crepitus. Achilles tendon exhibits no pain or defects.        Neurological:  No deficits in 2 point tactile discrimination, vibratory, light touch or pressure.       Assessment:        Encounter Diagnosis   Name Primary?    Plantar wart Yes           Plan:     Discussion with patient regarding treatment options, etiology. Explained to patient that this will be a staged procedure as the lesion typically require multiple treatments to destroy.  Lesion was sharply pared  with a 15 blade.  Patient tolerated well. 0.3 cc of candida antigen injected into each lesion.  Patient was encouraged to keep the area clean and dry and stay off that area such as possible. Topical Compound W daily. Tylenol p.r.n. for pain.  RTC 10 days

## 2018-11-29 NOTE — LETTER
November 29, 2018      [facility]  [address] [address]  Phone: [phone]  Fax: [phone]       Patient: Terra Hamilton   YOB: 1995  Date of Visit: 11/29/2018    To Whom It May Concern:    Steff Hamilton  was at [facility] on 11/29/2018.  She may return to work. If you have any questions or concerns, or if I can be of further assistance, please do not hesitate to contact me.    Sincerely,    Juana Gutierrez MA

## 2018-11-30 ENCOUNTER — PATIENT MESSAGE (OUTPATIENT)
Dept: PODIATRY | Facility: CLINIC | Age: 23
End: 2018-11-30

## 2018-12-11 ENCOUNTER — TELEPHONE (OUTPATIENT)
Dept: DERMATOLOGY | Facility: CLINIC | Age: 23
End: 2018-12-11

## 2018-12-11 ENCOUNTER — LAB VISIT (OUTPATIENT)
Dept: LAB | Facility: HOSPITAL | Age: 23
End: 2018-12-11
Attending: DERMATOLOGY
Payer: COMMERCIAL

## 2018-12-11 ENCOUNTER — PATIENT MESSAGE (OUTPATIENT)
Dept: DERMATOLOGY | Facility: CLINIC | Age: 23
End: 2018-12-11

## 2018-12-11 ENCOUNTER — OFFICE VISIT (OUTPATIENT)
Dept: DERMATOLOGY | Facility: CLINIC | Age: 23
End: 2018-12-11
Payer: COMMERCIAL

## 2018-12-11 DIAGNOSIS — L70.0 CYSTIC ACNE: ICD-10-CM

## 2018-12-11 DIAGNOSIS — Z79.899 HIGH RISK MEDICATION USE: Primary | ICD-10-CM

## 2018-12-11 DIAGNOSIS — Z79.899 HIGH RISK MEDICATION USE: ICD-10-CM

## 2018-12-11 DIAGNOSIS — L73.0 ACNE KELOIDALIS NUCHAE: ICD-10-CM

## 2018-12-11 LAB — B-HCG UR QL: NEGATIVE

## 2018-12-11 PROCEDURE — 99214 OFFICE O/P EST MOD 30 MIN: CPT | Mod: S$GLB,,, | Performed by: DERMATOLOGY

## 2018-12-11 PROCEDURE — 81025 URINE PREGNANCY TEST: CPT | Mod: PO

## 2018-12-11 PROCEDURE — 99999 PR PBB SHADOW E&M-EST. PATIENT-LVL II: CPT | Mod: PBBFAC,,, | Performed by: DERMATOLOGY

## 2018-12-11 NOTE — TELEPHONE ENCOUNTER
----- Message from Kimberli Bergeron MA sent at 12/11/2018  1:07 PM CST -----  Regarding: Accutane  Please call patient to schedule a one month follow up appointment for Accutane. She has already been registered in [facility] by our office but lives in [place] and would like to establish care with Dr. Raza

## 2018-12-11 NOTE — TELEPHONE ENCOUNTER
----- Message from Gabbie Lux sent at 12/11/2018  1:22 PM CST -----  Contact: pt  She's returning a missed call, please advise [phone] (home)

## 2018-12-11 NOTE — TELEPHONE ENCOUNTER
----- Message from Michaela Caldwell MD sent at 12/11/2018  1:35 PM CST -----  Negative UPT, please confirm in [facility]pledge

## 2018-12-11 NOTE — PROGRESS NOTES
Subjective:       Patient ID:  Terra Hamilton is a 23 y.o. female who presents for   Chief Complaint   Patient presents with    Lesion    Acne     Last seen 5-  Today c/o acne on face and posterior neck treating with Tazorac 0.05% cream qhs she is interested in starting Accutane  Follow up Acne keloidalis nuchae treated with Intralesional Kenalog 10mg/cc (3.8 cc total) injected into 12 lesions on the scalp at last visit Units: [facility] for Kenalog 10mg/cc:  8812-4045-65     Pt discontinued spironolactone (ALDACTONE) 50 MG tablet due it causing her to become dizzy  Student at  [employer]   No phx of skin cancer    Past Medical History:  No date: Focal seizures  No date: History of polycystic ovarian disease  No date: Seizures      Comment:  2013  No date: Speech delay                IBS- chronic constipation on amitiza  History epilepsy on lamictal      Review of Systems   Skin: Negative for itching, rash, dry skin and tendency to form keloidal scars.   Psychiatric/Behavioral: Negative for depressed mood and anxiety.        Objective:    Physical Exam   Constitutional: She appears well-developed and well-nourished. No distress.   HENT:   Head:       Mouth/Throat: Lips normal.    Eyes: Lids are normal.  No conjunctival no injection.   Cardiovascular: There is no local extremity swelling and no dependent edema.     Neurological: She is alert and oriented to person, place, and time. She is not disoriented.   Psychiatric: She has a normal mood and affect.   Skin:   Areas Examined (abnormalities noted in diagram):   Head / Face Inspection Performed  Neck Inspection Performed  Chest / Axilla Inspection Performed  Back Inspection Performed  RUE Inspected  LUE Inspection Performed              Diagram Legend     Erythematous scaling macule/papule c/w actinic keratosis       Vascular papule c/w angioma      Pigmented verrucoid papule/plaque c/w seborrheic keratosis      Yellow umbilicated papule c/w sebaceous  hyperplasia      Irregularly shaped tan macule c/w lentigo     1-2 mm smooth white papules consistent with Milia      Movable subcutaneous cyst with punctum c/w epidermal inclusion cyst      Subcutaneous movable cyst c/w pilar cyst      Firm pink to brown papule c/w dermatofibroma      Pedunculated fleshy papule(s) c/w skin tag(s)      Evenly pigmented macule c/w junctional nevus     Mildly variegated pigmented, slightly irregular-bordered macule c/w mildly atypical nevus      Flesh colored to evenly pigmented papule c/w intradermal nevus       Pink pearly papule/plaque c/w basal cell carcinoma      Erythematous hyperkeratotic cursted plaque c/w SCC      Surgical scar with no sign of skin cancer recurrence      Open and closed comedones      Inflammatory papules and pustules      Verrucoid papule consistent consistent with wart     Erythematous eczematous patches and plaques     Dystrophic onycholytic nail with subungual debris c/w onychomycosis     Umbilicated papule    Erythematous-base heme-crusted tan verrucoid plaque consistent with inflamed seborrheic keratosis     Erythematous Silvery Scaling Plaque c/w Psoriasis     See annotation      Assessment / Plan:        High risk medication use  -     CBC auto differential; Future  -     Comprehensive metabolic panel; Future  -     Lipid panel; Future  -     Pregnancy, urine rapid; Future    Cystic acne  Failed oral abx, topical retinoids, spironolactone, OCP  Family history cystic acne - sister s/p isotretinoin and tolerated  Discussed will need neuro approval prior to starting isotretinoin- history seizure disorder  Discussed risks and benefits of Isotretinoin including but not limited to dry eyes, dry skin, and dry lips; headaches; nosebleeds; muscle aches; joint aches; elevated liver functions; elevated cholesterol or triglycerides; depression; diarrhea/stomach cramping (inflammatory bowel disease); increased sun sensitivity. No laser while on Isotretinoin or for  six months after completion of Isotretinoin course. No waxing and no donating blood while on Isotretinoin or for one month after completion of Isotretinoin course.     Will schedule 1 month f/u with Dr. Raza as pt lives in [place]- attends [employer]        Acne keloidalis nuchae  Failed topical retinoids, ILK and oral abx  Trial isotretinoin, see above            Follow-up in about 4 weeks (around 1/8/2019).

## 2018-12-11 NOTE — TELEPHONE ENCOUNTER
[facility]

## 2018-12-14 ENCOUNTER — OFFICE VISIT (OUTPATIENT)
Dept: FAMILY MEDICINE | Facility: CLINIC | Age: 23
End: 2018-12-14
Payer: COMMERCIAL

## 2018-12-14 ENCOUNTER — LAB VISIT (OUTPATIENT)
Dept: LAB | Facility: HOSPITAL | Age: 23
End: 2018-12-14
Attending: DERMATOLOGY
Payer: COMMERCIAL

## 2018-12-14 VITALS
HEIGHT: 61 IN | HEART RATE: 83 BPM | DIASTOLIC BLOOD PRESSURE: 60 MMHG | WEIGHT: 244.06 LBS | RESPIRATION RATE: 18 BRPM | SYSTOLIC BLOOD PRESSURE: 100 MMHG | BODY MASS INDEX: 46.08 KG/M2 | TEMPERATURE: 98 F

## 2018-12-14 DIAGNOSIS — E28.2 PCOS (POLYCYSTIC OVARIAN SYNDROME): Primary | ICD-10-CM

## 2018-12-14 DIAGNOSIS — Z79.899 HIGH RISK MEDICATION USE: ICD-10-CM

## 2018-12-14 LAB
ALBUMIN SERPL BCP-MCNC: 3.7 G/DL
ALP SERPL-CCNC: 64 U/L
ALT SERPL W/O P-5'-P-CCNC: 22 U/L
ANION GAP SERPL CALC-SCNC: 10 MMOL/L
AST SERPL-CCNC: 21 U/L
BASOPHILS # BLD AUTO: 0.04 K/UL
BASOPHILS NFR BLD: 0.4 %
BILIRUB SERPL-MCNC: 0.3 MG/DL
BUN SERPL-MCNC: 13 MG/DL
CALCIUM SERPL-MCNC: 9.6 MG/DL
CHLORIDE SERPL-SCNC: 103 MMOL/L
CHOLEST SERPL-MCNC: 242 MG/DL
CHOLEST/HDLC SERPL: 4 {RATIO}
CO2 SERPL-SCNC: 25 MMOL/L
CREAT SERPL-MCNC: 0.8 MG/DL
DIFFERENTIAL METHOD: ABNORMAL
EOSINOPHIL # BLD AUTO: 0.1 K/UL
EOSINOPHIL NFR BLD: 0.6 %
ERYTHROCYTE [DISTWIDTH] IN BLOOD BY AUTOMATED COUNT: 12.8 %
EST. GFR  (AFRICAN AMERICAN): >60 ML/MIN/1.73 M^2
EST. GFR  (NON AFRICAN AMERICAN): >60 ML/MIN/1.73 M^2
GLUCOSE SERPL-MCNC: 83 MG/DL
HCT VFR BLD AUTO: 43.7 %
HDLC SERPL-MCNC: 61 MG/DL
HDLC SERPL: 25.2 %
HGB BLD-MCNC: 13.9 G/DL
IMM GRANULOCYTES # BLD AUTO: 0.02 K/UL
IMM GRANULOCYTES NFR BLD AUTO: 0.2 %
LDLC SERPL CALC-MCNC: 161 MG/DL
LYMPHOCYTES # BLD AUTO: 2.4 K/UL
LYMPHOCYTES NFR BLD: 24.1 %
MCH RBC QN AUTO: 31.3 PG
MCHC RBC AUTO-ENTMCNC: 31.8 G/DL
MCV RBC AUTO: 98 FL
MONOCYTES # BLD AUTO: 0.4 K/UL
MONOCYTES NFR BLD: 4.2 %
NEUTROPHILS # BLD AUTO: 7.1 K/UL
NEUTROPHILS NFR BLD: 70.5 %
NONHDLC SERPL-MCNC: 181 MG/DL
NRBC BLD-RTO: 0 /100 WBC
PLATELET # BLD AUTO: 273 K/UL
PMV BLD AUTO: 11.5 FL
POTASSIUM SERPL-SCNC: 4.3 MMOL/L
PROT SERPL-MCNC: 7.7 G/DL
RBC # BLD AUTO: 4.44 M/UL
SODIUM SERPL-SCNC: 138 MMOL/L
TRIGL SERPL-MCNC: 100 MG/DL
WBC # BLD AUTO: 10.06 K/UL

## 2018-12-14 PROCEDURE — 80061 LIPID PANEL: CPT

## 2018-12-14 PROCEDURE — 36415 COLL VENOUS BLD VENIPUNCTURE: CPT | Mod: PN

## 2018-12-14 PROCEDURE — 3008F BODY MASS INDEX DOCD: CPT | Mod: CPTII,S$GLB,, | Performed by: FAMILY MEDICINE

## 2018-12-14 PROCEDURE — 80053 COMPREHEN METABOLIC PANEL: CPT

## 2018-12-14 PROCEDURE — 99214 OFFICE O/P EST MOD 30 MIN: CPT | Mod: S$GLB,,, | Performed by: FAMILY MEDICINE

## 2018-12-14 PROCEDURE — 85025 COMPLETE CBC W/AUTO DIFF WBC: CPT

## 2018-12-14 PROCEDURE — 99999 PR PBB SHADOW E&M-EST. PATIENT-LVL III: CPT | Mod: PBBFAC,,, | Performed by: FAMILY MEDICINE

## 2018-12-14 RX ORDER — METFORMIN HYDROCHLORIDE 500 MG/1
500 TABLET, EXTENDED RELEASE ORAL
Qty: 30 TABLET | Refills: 3 | Status: SHIPPED | OUTPATIENT
Start: 2018-12-14 | End: 2019-03-04

## 2018-12-14 NOTE — PROGRESS NOTES
" THIS DOCUMENT WAS MADE IN PART WITH VOICE RECOGNITION SOFTWARE.  OCCASIONALLY THIS SOFTWARE WILL MISINTERPRET WORDS OR PHRASES.      Terra Chaney Joy  1995    Terra was seen today for follow-up.    Diagnoses and all orders for this visit:    PCOS (polycystic ovarian syndrome)  -     metFORMIN (GLUCOPHAGE-XR) 500 MG 24 hr tablet; Take 1 tablet (500 mg total) by mouth daily with dinner or evening meal.  Discussed continuing a healthy diet and exercise. Metformin may be beneficial at reducing insulin resistance and therefore insulin levels. She would like to try this so will start slowly and titrate depending on response and tolerability. She will follow back in a few months.  Appointment duration greater than 25 min., more than 50% face-to-face counseling      Subjective     Chief Complaint   Patient presents with    Follow-up     issues trying to lose weight        HPI    She returns with her mother. They're concerned about "hormone imbalances". Specifically she's having difficulty losing weight even though she feels she is taking in fewer calories and eating healthy. She does have a diagnosis of polycystic ovarian disease.      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/04/2017    Hallux valgus of right foot 06/01/2018     Resolved Ambulatory Problems     Diagnosis Date Noted    No Resolved Ambulatory Problems     Past Medical History:   Diagnosis Date    Focal seizures     History of polycystic ovarian disease     Seizures     Speech delay          Review of Systems   Constitutional: Positive for unexpected weight change.   Respiratory: Negative.    Cardiovascular: Negative.    Gastrointestinal: Negative.        Objective     Physical " "Exam   Constitutional: She is oriented to person, place, and time. She appears well-developed and well-nourished. No distress.   HENT:   Head: Normocephalic and atraumatic.   Eyes: No scleral icterus.   Pulmonary/Chest: Effort normal. No respiratory distress.   Neurological: She is alert and oriented to person, place, and time.   Skin: She is not diaphoretic.   Psychiatric: She has a normal mood and affect. Her behavior is normal.   Vitals reviewed.    Vitals:    12/14/18 1114   BP: 100/60   BP Location: Right arm   Patient Position: Sitting   BP Method: X-Large (Manual)   Pulse: 83   Resp: 18   Temp: 98.3 °F (36.8 °C)   TempSrc: Oral   Weight: 110.7 kg (244 lb 0.8 oz)   Height: 5' 1" (1.549 m)       MOST RECENT LABS IN OUR ELECTRONIC MEDICAL RECORD:     Results for orders placed or performed in visit on 12/11/18   Pregnancy, urine rapid   Result Value Ref Range    Preg Test, Ur Negative          "

## 2018-12-16 ENCOUNTER — PATIENT MESSAGE (OUTPATIENT)
Dept: FAMILY MEDICINE | Facility: CLINIC | Age: 23
End: 2018-12-16

## 2018-12-17 NOTE — TELEPHONE ENCOUNTER
Please call regarding cholesterol. Her cholesterol is moderately elevated. I do not recommend beginning medication just yet at her age. Although it is important for her to try to improve her cholesterol. I know she has already been working with her diet and I feel that it is fairly healthy but there are some additional things that she can consider doing. I would recommend taking 2 to 3 teaspoons of a soluble fiber daily like Metamucil. Start with one and then gradually increase as tolerated. Also she may consider taking an over-the-counter product called CholestOff. The recommended dosages two capsules twice a day but she can start taking this one capsule twice a day. Consider repeating in 3 to 4 months

## 2018-12-17 NOTE — TELEPHONE ENCOUNTER
----- Message from Tamanna Nolen sent at 12/17/2018  2:43 PM CST -----  Type:  Patient Returning Call    Who Called:  Patient  Who Left Message for Patient:  CALLIE BOND  Does the patient know what this is regarding?:  no  Best Call Back Number:  [phone]

## 2018-12-17 NOTE — TELEPHONE ENCOUNTER
I have attempted without success to contact this patient by phone to return their call, will try again later and I left a message on answering machine.

## 2018-12-18 ENCOUNTER — OFFICE VISIT (OUTPATIENT)
Dept: PODIATRY | Facility: CLINIC | Age: 23
End: 2018-12-18
Payer: COMMERCIAL

## 2018-12-18 ENCOUNTER — TELEPHONE (OUTPATIENT)
Dept: DERMATOLOGY | Facility: CLINIC | Age: 23
End: 2018-12-18

## 2018-12-18 VITALS — HEIGHT: 61 IN | BODY MASS INDEX: 46.07 KG/M2 | WEIGHT: 244 LBS

## 2018-12-18 DIAGNOSIS — B07.0 PLANTAR WART: Primary | ICD-10-CM

## 2018-12-18 PROCEDURE — 17110 DESTRUCTION B9 LES UP TO 14: CPT | Mod: S$GLB,,,

## 2018-12-18 PROCEDURE — 99499 UNLISTED E&M SERVICE: CPT | Mod: S$GLB,,,

## 2018-12-18 PROCEDURE — 99999 PR PBB SHADOW E&M-EST. PATIENT-LVL II: CPT | Mod: PBBFAC,,,

## 2018-12-18 NOTE — TELEPHONE ENCOUNTER
Attempted to inform pt of lab results. Not available at this time, left voicemail.   ----- Message from Michaela Caldwell MD sent at 12/18/2018 12:14 PM CST -----  Please call pt to inform we received her bloodwork. Her cholesterol is elevated. I recommend she take fish oil daily- at least 1000 mg. Also recommend dietary modification- avoid fried food and things high in saturated fat. Recommend a whole foods diet-  Minimal processed foods and sugar

## 2018-12-18 NOTE — PROGRESS NOTES
Subjective:       Chief Complaint: Wart (Lt foot )    HPI:     Patient presents to clinic reporting a painful lesion on the plantar aspect of the  Left foot.  Patient says it hurts when  weightbears.  Patient has tried some over-the-counter callus pads and acids and this has not worked.  Pain is described as sharp and has been there for several months.      Review of Systems:    Vascular : negative for edema and bruising  Respiratory: negative for cough or shortness of breath  Musculoskeletal: negative for joint pain and edema  Skin: negative for rashes, open wounds, + lesion plantar foot  Neurological: negative for burning, tingling and numbness  All other negative.        Objective:      Physical Exam      Constitutional:  Patient is oriented to person, place, and time. Vital signs are normal.  Appears well-developed and well-nourished.     Vascular:  Dorsalis pedis pulses are 2+ on the right side, and 2+ on the left side.   Posterior tibial pulses are 2+ on the right side, and 2+ on the left side.   + digital hair growth, no swelling, capillary fill time to all toes <3 seconds    Skin/Dermatological:  Skin is warm and intact. No rash noted. No cyanosis or clubbing. No open wounds. Verrucous lesion left sub 3,4 and lateral 5th MTH, tender with lateral compression, no signs of infection or drainage, some moderate improvement.      Musculoskeletal:      Normal range of motion bilateral ankle and pedal joints, no swelling or deformity, no tenderness or crepitus. Achilles tendon exhibits no pain or defects.        Neurological:  No deficits in 2 point tactile discrimination, vibratory, light touch or pressure.       Assessment:        Encounter Diagnosis   Name Primary?    Plantar wart Yes           Plan:     Discussion with patient regarding treatment options, etiology. Explained to patient that this will be a staged procedure as the lesion typically require multiple treatments to destroy.  Lesion was sharply pared  with a 15 blade.  Patient tolerated well. 0.3 cc of candida antigen injected into each lesion. Canthardone applied as well, wash off this evening. Patient was encouraged to keep the area clean and dry and stay off that area such as possible. Topical Compound W daily. Tylenol p.r.n. for pain.  RTC 7-10 days

## 2018-12-24 ENCOUNTER — TELEPHONE (OUTPATIENT)
Dept: DERMATOLOGY | Facility: CLINIC | Age: 23
End: 2018-12-24

## 2018-12-24 NOTE — TELEPHONE ENCOUNTER
----- Message from Michaela Caldwell MD sent at 12/18/2018 12:14 PM CST -----  Please call pt to inform we received her bloodwork. Her cholesterol is elevated. I recommend she take fish oil daily- at least 1000 mg. Also recommend dietary modification- avoid fried food and things high in saturated fat. Recommend a whole foods diet-  Minimal processed foods and sugar

## 2018-12-27 ENCOUNTER — OFFICE VISIT (OUTPATIENT)
Dept: PODIATRY | Facility: CLINIC | Age: 23
End: 2018-12-27
Payer: COMMERCIAL

## 2018-12-27 VITALS — HEIGHT: 61 IN | BODY MASS INDEX: 46.07 KG/M2 | WEIGHT: 244 LBS

## 2018-12-27 DIAGNOSIS — B07.0 PLANTAR WART: Primary | ICD-10-CM

## 2018-12-27 PROCEDURE — 99999 PR PBB SHADOW E&M-EST. PATIENT-LVL II: CPT | Mod: PBBFAC,,,

## 2018-12-27 PROCEDURE — 99024 POSTOP FOLLOW-UP VISIT: CPT | Mod: S$GLB,,,

## 2018-12-27 PROCEDURE — 3008F BODY MASS INDEX DOCD: CPT | Mod: CPTII,S$GLB,,

## 2018-12-27 NOTE — PROGRESS NOTES
Subjective:       Chief Complaint: Wart (Lt foot )    HPI:     Patient returns for f/u to plantar's warts.    Review of Systems:    Vascular : negative for edema and bruising  Respiratory: negative for cough or shortness of breath  Musculoskeletal: negative for joint pain and edema  Skin: negative for rashes, open wounds, + lesion plantar foot  Neurological: negative for burning, tingling and numbness  All other negative.        Objective:      Physical Exam      Constitutional:  Patient is oriented to person, place, and time. Vital signs are normal.  Appears well-developed and well-nourished.     Vascular:  Dorsalis pedis pulses are 2+ on the right side, and 2+ on the left side.   Posterior tibial pulses are 2+ on the right side, and 2+ on the left side.   + digital hair growth, no swelling, capillary fill time to all toes <3 seconds    Skin/Dermatological:  Skin is warm and intact. No rash noted. No cyanosis or clubbing. No open wounds. Verrucous lesion left lateral 5th MTH, others resolved    Musculoskeletal:      Normal range of motion bilateral ankle and pedal joints, no swelling or deformity, no tenderness or crepitus. Achilles tendon exhibits no pain or defects.        Neurological:  No deficits in 2 point tactile discrimination, vibratory, light touch or pressure.       Assessment:        Encounter Diagnosis   Name Primary?    Plantar wart Yes           Plan:     Discussion with patient regarding treatment options, etiology. Canthardone applied, wash off this evening. Patient was encouraged to keep the area clean and dry and stay off that area such as possible. Topical Compound W daily. Tylenol p.r.n. for pain.  RTC prn if recurrence.

## 2018-12-28 ENCOUNTER — OFFICE VISIT (OUTPATIENT)
Dept: OBSTETRICS AND GYNECOLOGY | Facility: CLINIC | Age: 23
End: 2018-12-28
Attending: OBSTETRICS & GYNECOLOGY
Payer: COMMERCIAL

## 2018-12-28 ENCOUNTER — PATIENT MESSAGE (OUTPATIENT)
Dept: FAMILY MEDICINE | Facility: CLINIC | Age: 23
End: 2018-12-28

## 2018-12-28 VITALS
SYSTOLIC BLOOD PRESSURE: 130 MMHG | WEIGHT: 243.81 LBS | DIASTOLIC BLOOD PRESSURE: 86 MMHG | BODY MASS INDEX: 46.07 KG/M2

## 2018-12-28 DIAGNOSIS — Z79.899 ENCOUNTER FOR LONG-TERM (CURRENT) USE OF HIGH-RISK MEDICATION: ICD-10-CM

## 2018-12-28 DIAGNOSIS — Z01.419 WELL WOMAN EXAM WITH ROUTINE GYNECOLOGICAL EXAM: Primary | ICD-10-CM

## 2018-12-28 PROCEDURE — 87624 HPV HI-RISK TYP POOLED RSLT: CPT

## 2018-12-28 PROCEDURE — 99395 PREV VISIT EST AGE 18-39: CPT | Mod: S$GLB,,, | Performed by: OBSTETRICS & GYNECOLOGY

## 2018-12-28 PROCEDURE — 99999 PR PBB SHADOW E&M-EST. PATIENT-LVL III: CPT | Mod: PBBFAC,,, | Performed by: OBSTETRICS & GYNECOLOGY

## 2018-12-28 PROCEDURE — 88175 CYTOPATH C/V AUTO FLUID REDO: CPT

## 2018-12-28 RX ORDER — NORGESTIMATE AND ETHINYL ESTRADIOL 7DAYSX3 LO
1 KIT ORAL DAILY
Qty: 28 TABLET | Refills: 12 | Status: SHIPPED | OUTPATIENT
Start: 2018-12-28 | End: 2020-01-08

## 2018-12-28 NOTE — PROGRESS NOTES
Chief Complaint   Patient presents with    Well Woman       History and Physical:  Patient's last menstrual period was 12/21/2018.       Terra Hamilton is a 23 y.o.  female who presents today for her routine annual GYN exam. The patient has no Gynecology complaints today. Reports loght menses on occasion on pills, reassured. Also occasional abscess / cyst on mons - counseled. Trying to lose weight, initially doing well with calorie count, improved - now gaining weight back- counseled.       Allergies: Review of patient's allergies indicates:  No Known Allergies    Past Medical History:   Diagnosis Date    Focal seizures     History of polycystic ovarian disease     Seizures     2013    Speech delay        Past Surgical History:   Procedure Laterality Date    BUNIONECTOMY Right 6/1/2018    [facility]    CORRECTION, HAMMER TOE Right 6/1/2018    [facility]    FOOT SURGERY      Left 5th toe PIPJ ARTHROPLASTY (29789) Left 7/26/2017    [facility] [place]    Left Travis BUNIONECTOMY (11461) Left 7/26/2017    [facility] [place]    ORIF Subtalar joint with Hyprocure (69042) Left 7/26/2017    [facility] [place]    orif subtalar joint with Hyprocure implant  Right 6/1/2018    [facility]    TONSILLECTOMY, ADENOIDECTOMY, BILATERAL MYRINGOTOMY AND TUBES      WISDOM TOOTH EXTRACTION         MEDS:   Current Outpatient Medications on File Prior to Visit   Medication Sig Dispense Refill    AMITIZA 24 mcg Cap TAKE 1 CAPSULE (24 MCG TOTAL) BY MOUTH 2 (TWO) TIMES DAILY. 60 capsule 9    lamotrigine (LAMICTAL) 200 MG tablet One twice daily (Patient taking differently: Take 200 mg by mouth 2 (two) times daily. One twice daily) 60 tablet 0    metFORMIN (GLUCOPHAGE-XR) 500 MG 24 hr tablet Take 1 tablet (500 mg  "total) by mouth daily with dinner or evening meal. 30 tablet 3    tazarotene (TAZORAC) 0.05 % Crea cream Thin film to face qhs, take night off if irritation develops 60 g 3    [DISCONTINUED] norgestimate-ethinyl estradiol (ORTHO TRI-CYCLEN LO) 0.18/0.215/0.25 mg-25 mcg tablet Take 1 tablet by mouth once daily. 28 tablet 2    ethosuximide (ZARONTIN) 250 mg Cap 250 mg 2 (two) times daily.        No current facility-administered medications on file prior to visit.        OB History      Para Term  AB Living    0 0 0 0 0 0    SAB TAB Ectopic Multiple Live Births    0 0 0 0 0          Social History     Socioeconomic History    Marital status: Single     Spouse name: Not on file    Number of children: Not on file    Years of education: Not on file    Highest education level: Not on file   Social Needs    Financial resource strain: Not on file    Food insecurity - worry: Not on file    Food insecurity - inability: Not on file    Transportation needs - medical: Not on file    Transportation needs - non-medical: Not on file   Occupational History    Not on file   Tobacco Use    Smoking status: Never Smoker    Smokeless tobacco: Never Used   Substance and Sexual Activity    Alcohol use: Yes     Comment: only on special occasions    Drug use: No    Sexual activity: Yes     Partners: Male     Birth control/protection: Condom   Other Topics Concern    Are you pregnant or think you may be? Not Asked    Breast-feeding Not Asked   Social History Narrative    Lives with Mom, Dad.Sister is in college.1 dog, 1 cat, 1 rabbitIn 10th grade. School is "ok".       Family History   Problem Relation Age of Onset    Hypertension Mother     Heart disease Mother     Arthritis Mother     Cholelithiasis Mother     Hypertension Father     Asthma Sister     Endometriosis Sister     Anemia Sister     Hypertension Maternal Grandmother     Heart disease Maternal Grandfather     Breast cancer Paternal " Grandmother     Myasthenia gravis Paternal Grandmother     Myasthenia gravis Paternal Grandfather     Diabetes Paternal Grandfather     Irritable bowel syndrome Maternal Aunt     Ulcerative colitis Maternal Uncle     Ovarian cancer Other          Past medical and surgical history reviewed.   I have reviewed the patient's medical history in detail and updated the computerized patient record.        Review of System:   General: no chills, fever, night sweats, weight gain or weight loss  Psychological: no depression or suicidal ideation  Breasts: no new or changing breast lumps, nipple discharge or masses.  Respiratory: no cough, shortness of breath, or wheezing  Cardiovascular: no chest pain or dyspnea on exertion  Gastrointestinal: no abdominal pain, change in bowel habits, or black or bloody stools  Genito-Urinary: no incontinence, urinary frequency/urgency or vulvar/vaginal symptoms, pelvic pain or abnormal vaginal bleeding.  Musculoskeletal: no gait disturbance or muscular weakness      Physical Exam:   /86   Wt 110.6 kg (243 lb 13.3 oz)   LMP 12/21/2018   BMI 46.07 kg/m²   Constitutional: She appears alert and responsive. She appears well-developed, well-groomed, and well-nourished. No distress. OverWeight   HENT:   Head: Normocephalic and atraumatic.   Eyes: Conjunctivae and EOM are normal. No scleral icterus.   Neck: Symmetrical. Normal range of motion. Neck supple. No tracheal deviation present. THYROID: without masses or tenderness.  Cardiovascular: Normal rate, no rhythm abnormality noted. Extremities without swelling or edema, warm.    Pulmonary/Chest: Normal respiratory Effort. No distress or retractions. She exhibits no tenderness.  Breasts: are symmetrical.   Right breast exhibits no inverted nipple, no mass, no nipple discharge, no skin change and no tenderness.   Left breast exhibits no inverted nipple, no mass, no nipple discharge, no skin change and no tenderness.  Abdominal: Soft. She  exhibits no distension, hernias or masses. There is no tenderness. No enlargement of liver edge or spleen.  There is no rebound and no guarding.   Genitourinary:    External rectal exam shows no thrombosed external hemorrhoids, no lesions.     Pelvic exam was performed with patient supine.   No labial fusion, and symmetrical.    There is no rash, lesion or injury on the right labia.   There is no rash, lesion or injury on the left labia.   No bleeding and no signs of injury around the vaginal introitus, urethral meatus is normal size and without prolapse or lesions, urethra well supported. The cervix is visualized with no discharge, lesions or friability.   No vaginal discharge found.   No significant Cystocele, Enterocele or rectocele, and cervix and uterus well supported.   Bimanual exam:   The urethra is normal to palpation and there are no palpable vaginal wall masses.   Uterus is not deviated, not enlarged, not fixed, normal shape and not tender.   Cervix exhibits no motion tenderness.    Right adnexum displays no mass or nodularity and no tenderness.   Left adnexum displays no mass or nodularity and no tenderness.  Musculoskeletal: Normal range of motion.   Lymphadenopathy: No inguinal adenopathy present.   Neurological: She is alert and oriented to person, place, and time. Coordination normal.   Skin: Skin is warm and dry. She is not diaphoretic. No rashes, lesions or ulcers.   Psychiatric: She has a normal mood and affect, oriented to person, place, and time.      Assessment:   Normal annual GYN exam  1. Well woman exam with routine gynecological exam  Liquid-based pap smear, screening    HPV High Risk Genotypes, PCR   2. Encounter for long-term (current) use of high-risk medication  norgestimate-ethinyl estradiol (ORTHO TRI-CYCLEN LO) 0.18/0.215/0.25 mg-25 mcg tablet   PCOS history - doing well on oral contraceptive pills   Just started metformin    Plan:   PAP  Refill oral contraceptive pills    reassured  Follow up in 1 year.  Patient informed will be contacted with results within 2 weeks. Encouraged to please call back or email if she has not heard from us by then.

## 2018-12-31 NOTE — TELEPHONE ENCOUNTER
Patient was informed that provider is out of the office, and this message will not be addressed until January 7th. Asked patient if she has enough of current dose to last until 1/7/19

## 2019-01-03 LAB
HPV HR 12 DNA CVX QL NAA+PROBE: NEGATIVE
HPV16 AG SPEC QL: NEGATIVE
HPV18 DNA SPEC QL NAA+PROBE: NEGATIVE

## 2019-01-04 ENCOUNTER — PATIENT MESSAGE (OUTPATIENT)
Dept: FAMILY MEDICINE | Facility: CLINIC | Age: 24
End: 2019-01-04

## 2019-01-07 ENCOUNTER — OFFICE VISIT (OUTPATIENT)
Dept: DERMATOLOGY | Facility: CLINIC | Age: 24
End: 2019-01-07
Payer: COMMERCIAL

## 2019-01-07 DIAGNOSIS — Z79.899 HIGH RISK MEDICATION USE: ICD-10-CM

## 2019-01-07 DIAGNOSIS — L73.0 ACNE KELOIDALIS NUCHAE: Primary | ICD-10-CM

## 2019-01-07 DIAGNOSIS — L70.0 ACNE VULGARIS: ICD-10-CM

## 2019-01-07 PROCEDURE — 99999 PR PBB SHADOW E&M-EST. PATIENT-LVL II: CPT | Mod: PBBFAC,,, | Performed by: PHYSICIAN ASSISTANT

## 2019-01-07 PROCEDURE — 99214 OFFICE O/P EST MOD 30 MIN: CPT | Mod: S$GLB,,, | Performed by: PHYSICIAN ASSISTANT

## 2019-01-07 PROCEDURE — 99999 PR PBB SHADOW E&M-EST. PATIENT-LVL II: ICD-10-PCS | Mod: PBBFAC,,, | Performed by: PHYSICIAN ASSISTANT

## 2019-01-07 PROCEDURE — 99214 PR OFFICE/OUTPT VISIT, EST, LEVL IV, 30-39 MIN: ICD-10-PCS | Mod: S$GLB,,, | Performed by: PHYSICIAN ASSISTANT

## 2019-01-07 NOTE — PROGRESS NOTES
Subjective:       Patient ID:  Terra Hamilton is a 23 y.o. female who presents for   Chief Complaint   Patient presents with    Acne     pt presents today to start accutane     Hx of acne of face and posterior neck with scarring and a hx of epilepsy on lamotrigine and ethosuximide (followed by Dr. Sloan Elizabeth in [place], [place] and last seen 12/217/18. Has been followed for acne by Dr. Caldwell and had evaluation and discussion for accutane at last visit on 12/11/18, baseline labs with elevated lipids and negative hcg, on Ortho TriCyclen. Patient states she was verbally cleared by Dr. Elizabeth for accutane.  Motivated to start accutane.    Current tx: tazorac 0.05% cream, [facility] acne cleanser  Previous tx: spironolactone, oral ABX, topical retinoids, and OCP  FHX of cystic acne (sister, s/p accutane); denies FHX of IBD  PMHX: negative for IBD, but + IBS- chronic constipation on amitiza    ipledge ID# is [id number]        Review of Systems   Constitutional: Negative for fever and chills.   Gastrointestinal: Negative for nausea and vomiting.   Skin: Negative for itching, rash, sun sensitivity, daily sunscreen use and activity-related sunscreen use.   Hematologic/Lymphatic: Does not bruise/bleed easily.        Objective:    Physical Exam   Constitutional: She appears well-developed and well-nourished. No distress.   Neurological: She is alert and oriented to person, place, and time. She is not disoriented.   Psychiatric: She has a normal mood and affect.   Skin:   Areas Examined (abnormalities noted in diagram):   Scalp / Hair Palpated and Inspected  Head / Face Inspection Performed  Neck Inspection Performed  Chest / Axilla Inspection Performed  Back Inspection Performed  RUE Inspected  LUE Inspection Performed                   Diagram Legend     Erythematous scaling macule/papule c/w actinic keratosis       Vascular papule c/w angioma      Pigmented verrucoid papule/plaque c/w seborrheic keratosis      Yellow  umbilicated papule c/w sebaceous hyperplasia      Irregularly shaped tan macule c/w lentigo     1-2 mm smooth white papules consistent with Milia      Movable subcutaneous cyst with punctum c/w epidermal inclusion cyst      Subcutaneous movable cyst c/w pilar cyst      Firm pink to brown papule c/w dermatofibroma      Pedunculated fleshy papule(s) c/w skin tag(s)      Evenly pigmented macule c/w junctional nevus     Mildly variegated pigmented, slightly irregular-bordered macule c/w mildly atypical nevus      Flesh colored to evenly pigmented papule c/w intradermal nevus       Pink pearly papule/plaque c/w basal cell carcinoma      Erythematous hyperkeratotic cursted plaque c/w SCC      Surgical scar with no sign of skin cancer recurrence      Open and closed comedones      Inflammatory papules and pustules      Verrucoid papule consistent consistent with wart     Erythematous eczematous patches and plaques     Dystrophic onycholytic nail with subungual debris c/w onychomycosis     Umbilicated papule    Erythematous-base heme-crusted tan verrucoid plaque consistent with inflamed seborrheic keratosis     Erythematous Silvery Scaling Plaque c/w Psoriasis     See annotation      Assessment / Plan:        Acne keloidalis nuchae  Acne vulgaris  High risk medication use  -     Lipid panel; Future  -     hCG, quantitative; Future    Will check above labs in anticipation of starting accutane. Reviewed previous baseline labs and discussed strategies to help reduce lipids.  Patient will need to obtain written medical clearance to start accutane from neurologist, Dr. Wagner Elizabeth, based on history of seizures (last seizure 6 years ago). If labs stable and medical clearance obtained, will plan to start low dose accutane. Ipledge counseling done.  Current weight is approximately 110 kg, target dose 80 mg daily divided BID (approx. 0.5 - 1 mg/kg/d). Target total = 13,200 - 16,500 (120-150 mg/kg). Reviewed two forms of birth  control: OCP and condoms with spermicide. Side effect profile reviewed. #30 day prescription will be provided once labs reviewed. Return to clinic in 4 weeks and will repeat CBC, CMP, hcg and fasting lipid panel.    Accutane is discussed fully with the patient. It is a very effective drug to treat acne vulgaris but has many significant side effects. Chief among these are teratogenesis, hepatic injury, dyslipidemia and severe drying of the mucous membranes. All of these issues have been discussed in details. Monthly blood tests to monitor lipids and liver functions will be necessary. Expect painful dryness and/or fissuring around the lips, eyes, and other moist areas of the body. Balms may be protective. Contact lens may be too painful to wear temporarily while on this drug. Episodes of significant depression have been reported, including suicidal ideation and attempts in rare cases. It may also cause pseudotumor cerebri and hyperostosis. Relationship to inflammatory bowel disease was discussed with patient. The patient will report any such changes in mood, depressive symptoms or suicidal thoughts, headaches, joint or bone pains. Ipledge counseling done. Side effect profile reviewed. #30 day prescription will be provided once labs reviewed and medical clearance from neurology.           Follow-up in about 4 weeks (around 2/4/2019) for acne.

## 2019-01-07 NOTE — LETTER
January 7, 2019      Michaela Caldwell MD  [address] [facility] [address]  [place] [address]           [facility]  [address]  [place] [address]  Phone: [phone]  Fax: [phone]          Patient: Terra Hamilton   MR Number: [id number]   YOB: 1995   Date of Visit: 1/7/2019       Dear Dr. Michaela Caldwell:    Thank you for referring Terra Hamilton to me for evaluation. Attached you will find relevant portions of my assessment and plan of care.    If you have questions, please do not hesitate to call me. I look forward to following Terra Hamilton along with you.    Sincerely,    Raisa Lopez PA-C    Enclosure  CC:  No Recipients    If you would like to receive this communication electronically, please contact [email or web address] or [phone] to request more information on [facility] access.    For providers and/or their staff who would like to refer a patient to [facility], please contact us through our one-stop-shop provider referral line, [facility], at [phone].    If you feel you have received this communication in error or would no longer like to receive these types of communications, please e-mail [email or web address]

## 2019-01-08 ENCOUNTER — LAB VISIT (OUTPATIENT)
Dept: LAB | Facility: HOSPITAL | Age: 24
End: 2019-01-08
Attending: PHYSICIAN ASSISTANT
Payer: COMMERCIAL

## 2019-01-08 ENCOUNTER — PATIENT MESSAGE (OUTPATIENT)
Dept: DERMATOLOGY | Facility: CLINIC | Age: 24
End: 2019-01-08

## 2019-01-08 DIAGNOSIS — Z79.899 HIGH RISK MEDICATION USE: ICD-10-CM

## 2019-01-08 LAB
CHOLEST SERPL-MCNC: 173 MG/DL
CHOLEST/HDLC SERPL: 3.3 {RATIO}
HCG INTACT+B SERPL-ACNC: <1.2 MIU/ML
HDLC SERPL-MCNC: 52 MG/DL
HDLC SERPL: 30.1 %
LDLC SERPL CALC-MCNC: 97.6 MG/DL
NONHDLC SERPL-MCNC: 121 MG/DL
TRIGL SERPL-MCNC: 117 MG/DL

## 2019-01-08 PROCEDURE — 80061 LIPID PANEL: CPT

## 2019-01-08 PROCEDURE — 84702 CHORIONIC GONADOTROPIN TEST: CPT

## 2019-01-08 PROCEDURE — 36415 COLL VENOUS BLD VENIPUNCTURE: CPT | Mod: PO

## 2019-01-17 RX ORDER — LUBIPROSTONE 24 UG/1
24 CAPSULE, GELATIN COATED ORAL 2 TIMES DAILY
Qty: 60 CAPSULE | Refills: 3 | Status: SHIPPED | OUTPATIENT
Start: 2019-01-17 | End: 2019-05-07 | Stop reason: SDUPTHER

## 2019-01-21 ENCOUNTER — OFFICE VISIT (OUTPATIENT)
Dept: FAMILY MEDICINE | Facility: CLINIC | Age: 24
End: 2019-01-21
Payer: COMMERCIAL

## 2019-01-21 ENCOUNTER — PATIENT MESSAGE (OUTPATIENT)
Dept: FAMILY MEDICINE | Facility: CLINIC | Age: 24
End: 2019-01-21

## 2019-01-21 ENCOUNTER — PATIENT MESSAGE (OUTPATIENT)
Dept: DERMATOLOGY | Facility: CLINIC | Age: 24
End: 2019-01-21

## 2019-01-21 VITALS
WEIGHT: 248.38 LBS | DIASTOLIC BLOOD PRESSURE: 70 MMHG | BODY MASS INDEX: 46.89 KG/M2 | TEMPERATURE: 98 F | HEIGHT: 61 IN | OXYGEN SATURATION: 99 % | SYSTOLIC BLOOD PRESSURE: 110 MMHG | HEART RATE: 111 BPM

## 2019-01-21 DIAGNOSIS — R21 RASH: ICD-10-CM

## 2019-01-21 DIAGNOSIS — R09.82 PND (POST-NASAL DRIP): Primary | ICD-10-CM

## 2019-01-21 DIAGNOSIS — J30.89 NON-SEASONAL ALLERGIC RHINITIS, UNSPECIFIED TRIGGER: ICD-10-CM

## 2019-01-21 DIAGNOSIS — J02.9 SORE THROAT: ICD-10-CM

## 2019-01-21 PROCEDURE — 99213 OFFICE O/P EST LOW 20 MIN: CPT | Mod: S$GLB,,, | Performed by: NURSE PRACTITIONER

## 2019-01-21 PROCEDURE — 3008F PR BODY MASS INDEX (BMI) DOCUMENTED: ICD-10-PCS | Mod: CPTII,S$GLB,, | Performed by: NURSE PRACTITIONER

## 2019-01-21 PROCEDURE — 3008F BODY MASS INDEX DOCD: CPT | Mod: CPTII,S$GLB,, | Performed by: NURSE PRACTITIONER

## 2019-01-21 PROCEDURE — 99213 PR OFFICE/OUTPT VISIT, EST, LEVL III, 20-29 MIN: ICD-10-PCS | Mod: S$GLB,,, | Performed by: NURSE PRACTITIONER

## 2019-01-21 PROCEDURE — 99999 PR PBB SHADOW E&M-EST. PATIENT-LVL IV: ICD-10-PCS | Mod: PBBFAC,,, | Performed by: NURSE PRACTITIONER

## 2019-01-21 PROCEDURE — 99999 PR PBB SHADOW E&M-EST. PATIENT-LVL IV: CPT | Mod: PBBFAC,,, | Performed by: NURSE PRACTITIONER

## 2019-01-21 NOTE — PROGRESS NOTES
This dictation has been generated using Modal Fluency Dictation some phonetic errors may occur. Please contact author for clarification if needed.     Problem List Items Addressed This Visit     None      Visit Diagnoses     PND (post-nasal drip)    -  Primary    Sore throat        Non-seasonal allergic rhinitis, unspecified trigger        Rash        contact to clothes        Postnasal drip and sore throat symptoms.  Recommended antihistamine.  Patient notes a rash with contact with close.  May be from chemicals from dry cleaning.  Patient notes onset of sore throat postnasal drip after having had rash on hands.  Recommended antihistamine and histamine blocker use in future as needed.  Discussed having some on hand.  No anaphylaxis.  Patient Instructions   Zyrtec 10 mg daily for 3-7 days for post nasal drip and sore throat.  flonase daily  Zantac (Ranitidine) 150 mg once.     Follow up for fever.        Follow-up if symptoms worsen or fail to improve.    ________________________________________________________________  ________________________________________________________________      Chief Complaint   Patient presents with    Sore Throat     post nasal drip/hurt to swallow     History of present illness  This 23 y.o. presents today for complaint of sore throat symptoms.  Patient notes symptoms started Saturday with some postnasal drip and sore throat symptoms.  She had increase in pain with swallowing and talking.  She took Zyrtec and NyQuil for symptoms.  Symptoms have improved.  No known sick contact.  Prior to onset patient notes that she was at her friend's house helping her pick out some dressed close.  They noted she had redness and swelling to the hands.  Postnasal drip and sore throat issues started after that.  It has improved today.  Review of systems  Felt fevers no chills or body aches  No rash elsewhere  Denies any throat tightness difficulty swallowing or shortness of breath.  No nausea vomiting  or diarrhea    Past medical and social history reviewed.    Past Medical History:   Diagnosis Date    Focal seizures     History of polycystic ovarian disease     Seizures     2013    Speech delay        Past Surgical History:   Procedure Laterality Date    BUNIONECTOMY Right 6/1/2018    [facility]    CORRECTION, HAMMER TOE Right 6/1/2018    [facility]    FOOT SURGERY      Left 5th toe PIPJ ARTHROPLASTY (29503) Left 7/26/2017    [facility]    Left Travis BUNIONECTOMY (90523) Left 7/26/2017    [facility]    ORIF Subtalar joint with Hyprocure (33814) Left 7/26/2017    [facility]    orif subtalar joint with Hyprocure implant  Right 6/1/2018    [facility]    TONSILLECTOMY, ADENOIDECTOMY, BILATERAL MYRINGOTOMY AND TUBES      WISDOM TOOTH EXTRACTION         Family History   Problem Relation Age of Onset    Hypertension Mother     Heart disease Mother     Arthritis Mother     Cholelithiasis Mother     Hypertension Father     Asthma Sister     Endometriosis Sister     Anemia Sister     Hypertension Maternal Grandmother     Heart disease Maternal Grandfather     Breast cancer Paternal Grandmother     Myasthenia gravis Paternal Grandmother     Myasthenia gravis Paternal Grandfather     Diabetes Paternal Grandfather     Irritable bowel syndrome Maternal Aunt     Ulcerative colitis Maternal Uncle     Ovarian cancer Other        Social History     Socioeconomic History    Marital status: Single     Spouse name: None    Number of children: None    Years of education: None    Highest education level: None   Social Needs    Financial resource strain: None    Food insecurity - worry: None    Food insecurity - inability: None    Transportation needs - medical: None     "Transportation needs - non-medical: None   Occupational History    None   Tobacco Use    Smoking status: Never Smoker    Smokeless tobacco: Never Used   Substance and Sexual Activity    Alcohol use: Yes     Comment: only on special occasions    Drug use: No    Sexual activity: Yes     Partners: Male     Birth control/protection: Condom   Other Topics Concern    Are you pregnant or think you may be? Not Asked    Breast-feeding Not Asked   Social History Narrative    Lives with Mom, Dad.Sister is in college.1 dog, 1 cat, 1 rabbitIn 10th grade. School is "ok".       Current Outpatient Medications   Medication Sig Dispense Refill    AMITIZA 24 mcg Cap TAKE 1 CAPSULE (24 MCG TOTAL) BY MOUTH 2 (TWO) TIMES DAILY. 60 capsule 3    lamotrigine (LAMICTAL) 200 MG tablet One twice daily (Patient taking differently: Take 200 mg by mouth 2 (two) times daily. One twice daily) 60 tablet 0    metFORMIN (GLUCOPHAGE-XR) 500 MG 24 hr tablet Take 1 tablet (500 mg total) by mouth daily with dinner or evening meal. 30 tablet 3    norgestimate-ethinyl estradiol (ORTHO TRI-CYCLEN LO) 0.18/0.215/0.25 mg-25 mcg tablet Take 1 tablet by mouth once daily. 28 tablet 12    tazarotene (TAZORAC) 0.05 % Crea cream Thin film to face qhs, take night off if irritation develops 60 g 3     No current facility-administered medications for this visit.        Review of patient's allergies indicates:  No Known Allergies    Physical examination  Vitals Reviewed  Gen. Well-dressed well-nourished   Skin warm dry and intact.  No rashes noted.  HEENT.  TM intact bilateral with normal light reflex.  No mastoid tenderness during percussion.  Nares patent bilateral.  Pharynx is unremarkable.  No maxillary or frontal sinus tenderness when percussed.    Neck is supple without adenopathy  Chest.  Respirations are even unlabored.  Lungs are clear to auscultation.  Cardiac regular rate and rhythm.  No chest wall adenopathy noted.  Neuro. Awake alert oriented " x4.  Normal judgment and cognition noted.  Extremities no clubbing cyanosis or edema noted.     Call or return to clinic prn if these symptoms worsen or fail to improve as anticipated.

## 2019-01-21 NOTE — PATIENT INSTRUCTIONS
Zyrtec 10 mg daily for 3-7 days for post nasal drip and sore throat.  flonase daily  Zantac (Ranitidine) 150 mg once.     Follow up for fever.

## 2019-01-22 RX ORDER — IBUPROFEN 800 MG/1
TABLET ORAL
Qty: 12 TABLET | Refills: 0 | Status: SHIPPED | OUTPATIENT
Start: 2019-01-22 | End: 2022-05-26

## 2019-01-22 NOTE — TELEPHONE ENCOUNTER
See my chart message.  I printed a prescription for magic mouthwash along with a prescription for ibuprofen.  She may try these.  Drink plenty of water.  Salt water gargles in between can also help.

## 2019-01-23 ENCOUNTER — PATIENT MESSAGE (OUTPATIENT)
Dept: DERMATOLOGY | Facility: CLINIC | Age: 24
End: 2019-01-23

## 2019-01-28 ENCOUNTER — PATIENT MESSAGE (OUTPATIENT)
Dept: DERMATOLOGY | Facility: CLINIC | Age: 24
End: 2019-01-28

## 2019-02-01 ENCOUNTER — PATIENT MESSAGE (OUTPATIENT)
Dept: DERMATOLOGY | Facility: CLINIC | Age: 24
End: 2019-02-01

## 2019-02-04 DIAGNOSIS — Z79.899 HIGH RISK MEDICATION USE: Primary | ICD-10-CM

## 2019-02-07 ENCOUNTER — LAB VISIT (OUTPATIENT)
Dept: LAB | Facility: HOSPITAL | Age: 24
End: 2019-02-07
Attending: PHYSICIAN ASSISTANT
Payer: COMMERCIAL

## 2019-02-07 DIAGNOSIS — Z79.899 HIGH RISK MEDICATION USE: ICD-10-CM

## 2019-02-07 LAB — HCG INTACT+B SERPL-ACNC: <1.2 MIU/ML

## 2019-02-07 PROCEDURE — 36415 COLL VENOUS BLD VENIPUNCTURE: CPT

## 2019-02-07 PROCEDURE — 84702 CHORIONIC GONADOTROPIN TEST: CPT

## 2019-02-11 DIAGNOSIS — L70.0 ACNE VULGARIS: Primary | ICD-10-CM

## 2019-02-11 RX ORDER — ISOTRETINOIN 40 MG/1
CAPSULE ORAL
Qty: 30 CAPSULE | Refills: 0 | Status: SHIPPED | OUTPATIENT
Start: 2019-02-11 | End: 2019-03-13

## 2019-02-12 ENCOUNTER — PATIENT MESSAGE (OUTPATIENT)
Dept: DERMATOLOGY | Facility: CLINIC | Age: 24
End: 2019-02-12

## 2019-02-13 ENCOUNTER — PATIENT MESSAGE (OUTPATIENT)
Dept: DERMATOLOGY | Facility: CLINIC | Age: 24
End: 2019-02-13

## 2019-02-14 DIAGNOSIS — Z79.899 HIGH RISK MEDICATION USE: Primary | ICD-10-CM

## 2019-02-24 ENCOUNTER — PATIENT MESSAGE (OUTPATIENT)
Dept: FAMILY MEDICINE | Facility: CLINIC | Age: 24
End: 2019-02-24

## 2019-03-04 ENCOUNTER — OFFICE VISIT (OUTPATIENT)
Dept: FAMILY MEDICINE | Facility: CLINIC | Age: 24
End: 2019-03-04
Payer: COMMERCIAL

## 2019-03-04 VITALS
BODY MASS INDEX: 47.64 KG/M2 | WEIGHT: 252.31 LBS | DIASTOLIC BLOOD PRESSURE: 62 MMHG | HEART RATE: 94 BPM | SYSTOLIC BLOOD PRESSURE: 82 MMHG | TEMPERATURE: 99 F | HEIGHT: 61 IN | RESPIRATION RATE: 18 BRPM

## 2019-03-04 DIAGNOSIS — E28.2 PCOS (POLYCYSTIC OVARIAN SYNDROME): Primary | ICD-10-CM

## 2019-03-04 DIAGNOSIS — R21 RASH OF HANDS: ICD-10-CM

## 2019-03-04 PROCEDURE — 99214 PR OFFICE/OUTPT VISIT, EST, LEVL IV, 30-39 MIN: ICD-10-PCS | Mod: S$GLB,,, | Performed by: FAMILY MEDICINE

## 2019-03-04 PROCEDURE — 3008F PR BODY MASS INDEX (BMI) DOCUMENTED: ICD-10-PCS | Mod: CPTII,S$GLB,, | Performed by: FAMILY MEDICINE

## 2019-03-04 PROCEDURE — 99214 OFFICE O/P EST MOD 30 MIN: CPT | Mod: S$GLB,,, | Performed by: FAMILY MEDICINE

## 2019-03-04 PROCEDURE — 3008F BODY MASS INDEX DOCD: CPT | Mod: CPTII,S$GLB,, | Performed by: FAMILY MEDICINE

## 2019-03-04 PROCEDURE — 99999 PR PBB SHADOW E&M-EST. PATIENT-LVL IV: ICD-10-PCS | Mod: PBBFAC,,, | Performed by: FAMILY MEDICINE

## 2019-03-04 PROCEDURE — 99999 PR PBB SHADOW E&M-EST. PATIENT-LVL IV: CPT | Mod: PBBFAC,,, | Performed by: FAMILY MEDICINE

## 2019-03-04 RX ORDER — METFORMIN HYDROCHLORIDE 500 MG/1
1000 TABLET, EXTENDED RELEASE ORAL
Qty: 180 TABLET | Refills: 3 | Status: SHIPPED | OUTPATIENT
Start: 2019-03-04 | End: 2020-03-13

## 2019-03-04 NOTE — PROGRESS NOTES
THIS DOCUMENT WAS MADE IN PART WITH VOICE RECOGNITION SOFTWARE.  OCCASIONALLY THIS SOFTWARE WILL MISINTERPRET WORDS OR PHRASES.      Terra Hamilton  1995    Terra was seen today for follow-up.    Diagnoses and all orders for this visit:    PCOS (polycystic ovarian syndrome)  -     metFORMIN (GLUCOPHAGE-XR) 500 MG 24 hr tablet; Take 2 tablets (1,000 mg total) by mouth daily with dinner or evening meal.  She is tolerating the metformin 5 mg.  Will titrate to 1000.  She may take 1000 with supper or split b.i.d. depending on tolerability or preference.  Weight is still a concern.  There are some dietary improvements that we discussed.  She will try to focus on a healthy lower carb diet.  If not improving she could consider consulting with the dietitian or even a formal diet plan like the ideal protein diet though I did advise her of cost for these programs but if she is not succeeding this may be a reasonable option    Rash of hands  -     Ambulatory referral to Dermatology  Pay attention to any obvious triggers.  If this happens again take a Zyrtec again right away and then begin taking 1 daily for prevention.  As of right now were going to watch and see if this reoccurs.  The fact that it comes on abruptly followed by pretty intense itching and relieved quickly by antihistamine is reassuring but I can't pinpoint an obvious trigger at this point      Subjective     Chief Complaint   Patient presents with    Follow-up       HPI    Hands will 'get bright red, swollen, and itch', no blisters or peeling  No sure what triggers it  Zyrtec helps in about 10 minutes  3 episodes  Not feet, no rash elsewhere  No new meds (has not started the Accutane)    No obvious food allergies, but feels she may be sensitive to gluten, sneezing    HPI elements addressed above in the assessment and plan including problems, diagnosis, stability/instability,  improving/worsening, and chronicity will not be duplicated in this  "section. Any important additional HPI topics will be discussed here if needed.    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/04/2017    Hallux valgus of right foot 06/01/2018     Resolved Ambulatory Problems     Diagnosis Date Noted    No Resolved Ambulatory Problems     Past Medical History:   Diagnosis Date    Focal seizures     History of polycystic ovarian disease     Seizures     Speech delay          Review of Systems   Constitutional: Positive for unexpected weight change.   Respiratory: Negative.    Gastrointestinal: Negative.    Genitourinary: Negative.    Musculoskeletal: Negative.    Skin: Positive for rash.       Objective     Physical Exam   Constitutional: She is oriented to person, place, and time. She appears well-developed and well-nourished. No distress.   HENT:   Head: Normocephalic and atraumatic.   Eyes: No scleral icterus.   Pulmonary/Chest: Effort normal. No respiratory distress.   Neurological: She is alert and oriented to person, place, and time.   Skin: She is not diaphoretic.   There are no visible rashes or lesions. At this time hands are completely normal   Psychiatric: She has a normal mood and affect. Her behavior is normal.   Vitals reviewed.    Vitals:    03/04/19 1117   BP: (!) 82/62   BP Location: Right arm   Patient Position: Sitting   BP Method: X-Large (Manual)   Pulse: 94   Resp: 18   Temp: 98.5 °F (36.9 °C)   TempSrc: Oral   Weight: 114.4 kg (252 lb 5.1 oz)   Height: 5' 1" (1.549 m)       MOST RECENT LABS IN OUR ELECTRONIC MEDICAL RECORD:     Results for orders placed or performed in visit on 02/07/19   hCG, quantitative   Result Value Ref Range    hCG Quant <1.2 See Text mIU/mL         "

## 2019-03-07 ENCOUNTER — LAB VISIT (OUTPATIENT)
Dept: LAB | Facility: HOSPITAL | Age: 24
End: 2019-03-07
Payer: COMMERCIAL

## 2019-03-07 DIAGNOSIS — Z79.899 HIGH RISK MEDICATION USE: ICD-10-CM

## 2019-03-07 LAB
ALBUMIN SERPL BCP-MCNC: 3.7 G/DL
ALP SERPL-CCNC: 67 U/L
ALT SERPL W/O P-5'-P-CCNC: 29 U/L
ANION GAP SERPL CALC-SCNC: 10 MMOL/L
AST SERPL-CCNC: 21 U/L
BASOPHILS # BLD AUTO: 0.04 K/UL
BASOPHILS NFR BLD: 0.4 %
BILIRUB SERPL-MCNC: 0.3 MG/DL
BUN SERPL-MCNC: 12 MG/DL
CALCIUM SERPL-MCNC: 10.2 MG/DL
CHLORIDE SERPL-SCNC: 104 MMOL/L
CHOLEST SERPL-MCNC: 264 MG/DL
CHOLEST/HDLC SERPL: 4.3 {RATIO}
CO2 SERPL-SCNC: 25 MMOL/L
CREAT SERPL-MCNC: 0.8 MG/DL
DIFFERENTIAL METHOD: NORMAL
EOSINOPHIL # BLD AUTO: 0.1 K/UL
EOSINOPHIL NFR BLD: 0.5 %
ERYTHROCYTE [DISTWIDTH] IN BLOOD BY AUTOMATED COUNT: 12.6 %
EST. GFR  (AFRICAN AMERICAN): >60 ML/MIN/1.73 M^2
EST. GFR  (NON AFRICAN AMERICAN): >60 ML/MIN/1.73 M^2
GLUCOSE SERPL-MCNC: 77 MG/DL
HCG INTACT+B SERPL-ACNC: <1.2 MIU/ML
HCT VFR BLD AUTO: 41.6 %
HDLC SERPL-MCNC: 62 MG/DL
HDLC SERPL: 23.5 %
HGB BLD-MCNC: 13.6 G/DL
IMM GRANULOCYTES # BLD AUTO: 0.03 K/UL
IMM GRANULOCYTES NFR BLD AUTO: 0.3 %
LDLC SERPL CALC-MCNC: 162.4 MG/DL
LYMPHOCYTES # BLD AUTO: 2.8 K/UL
LYMPHOCYTES NFR BLD: 25.2 %
MCH RBC QN AUTO: 30.6 PG
MCHC RBC AUTO-ENTMCNC: 32.7 G/DL
MCV RBC AUTO: 94 FL
MONOCYTES # BLD AUTO: 0.4 K/UL
MONOCYTES NFR BLD: 4 %
NEUTROPHILS # BLD AUTO: 7.7 K/UL
NEUTROPHILS NFR BLD: 69.6 %
NONHDLC SERPL-MCNC: 202 MG/DL
NRBC BLD-RTO: 0 /100 WBC
PLATELET # BLD AUTO: 334 K/UL
PMV BLD AUTO: 11.2 FL
POTASSIUM SERPL-SCNC: 3.7 MMOL/L
PROT SERPL-MCNC: 7.5 G/DL
RBC # BLD AUTO: 4.45 M/UL
SODIUM SERPL-SCNC: 139 MMOL/L
TRIGL SERPL-MCNC: 198 MG/DL
WBC # BLD AUTO: 11.02 K/UL

## 2019-03-07 PROCEDURE — 80053 COMPREHEN METABOLIC PANEL: CPT

## 2019-03-07 PROCEDURE — 80061 LIPID PANEL: CPT

## 2019-03-07 PROCEDURE — 85025 COMPLETE CBC W/AUTO DIFF WBC: CPT

## 2019-03-07 PROCEDURE — 36415 COLL VENOUS BLD VENIPUNCTURE: CPT

## 2019-03-07 PROCEDURE — 84702 CHORIONIC GONADOTROPIN TEST: CPT

## 2019-03-11 ENCOUNTER — PATIENT MESSAGE (OUTPATIENT)
Dept: DERMATOLOGY | Facility: CLINIC | Age: 24
End: 2019-03-11

## 2019-03-12 ENCOUNTER — PATIENT MESSAGE (OUTPATIENT)
Dept: DERMATOLOGY | Facility: CLINIC | Age: 24
End: 2019-03-12

## 2019-03-13 DIAGNOSIS — L70.0 ACNE VULGARIS: Primary | ICD-10-CM

## 2019-03-13 RX ORDER — ISOTRETINOIN 40 MG/1
40 CAPSULE ORAL DAILY
Qty: 30 CAPSULE | Refills: 0 | Status: SHIPPED | OUTPATIENT
Start: 2019-03-13 | End: 2019-07-11

## 2019-03-25 ENCOUNTER — PATIENT MESSAGE (OUTPATIENT)
Dept: FAMILY MEDICINE | Facility: CLINIC | Age: 24
End: 2019-03-25

## 2019-03-26 NOTE — TELEPHONE ENCOUNTER
See my chart message.  She has an appointment with Dermatology in about a week.  In the meantime see if she is still taking antihistamine.  If she is having worsening symptoms then we may need to see it again before she sees Dermatology.

## 2019-04-03 ENCOUNTER — TELEPHONE (OUTPATIENT)
Dept: DERMATOLOGY | Facility: CLINIC | Age: 24
End: 2019-04-03

## 2019-04-03 ENCOUNTER — PATIENT MESSAGE (OUTPATIENT)
Dept: DERMATOLOGY | Facility: CLINIC | Age: 24
End: 2019-04-03

## 2019-04-04 ENCOUNTER — OFFICE VISIT (OUTPATIENT)
Dept: DERMATOLOGY | Facility: CLINIC | Age: 24
End: 2019-04-04
Payer: COMMERCIAL

## 2019-04-04 ENCOUNTER — LAB VISIT (OUTPATIENT)
Dept: LAB | Facility: HOSPITAL | Age: 24
End: 2019-04-04
Attending: PHYSICIAN ASSISTANT
Payer: COMMERCIAL

## 2019-04-04 DIAGNOSIS — Z79.899 HIGH RISK MEDICATION USE: ICD-10-CM

## 2019-04-04 DIAGNOSIS — K13.0 DRY LIPS: ICD-10-CM

## 2019-04-04 DIAGNOSIS — L85.3 DRY SKIN: ICD-10-CM

## 2019-04-04 DIAGNOSIS — L70.0 ACNE VULGARIS: Primary | ICD-10-CM

## 2019-04-04 DIAGNOSIS — L25.9 CONTACT DERMATITIS, UNSPECIFIED CONTACT DERMATITIS TYPE, UNSPECIFIED TRIGGER: ICD-10-CM

## 2019-04-04 LAB
ALBUMIN SERPL BCP-MCNC: 3.6 G/DL (ref 3.5–5.2)
ALP SERPL-CCNC: 63 U/L (ref 55–135)
ALT SERPL W/O P-5'-P-CCNC: 52 U/L (ref 10–44)
ANION GAP SERPL CALC-SCNC: 9 MMOL/L (ref 8–16)
AST SERPL-CCNC: 29 U/L (ref 10–40)
BASOPHILS # BLD AUTO: 0.04 K/UL (ref 0–0.2)
BASOPHILS NFR BLD: 0.4 % (ref 0–1.9)
BILIRUB SERPL-MCNC: 0.3 MG/DL (ref 0.1–1)
BUN SERPL-MCNC: 11 MG/DL (ref 6–20)
CALCIUM SERPL-MCNC: 9.6 MG/DL (ref 8.7–10.5)
CHLORIDE SERPL-SCNC: 104 MMOL/L (ref 95–110)
CHOLEST SERPL-MCNC: 261 MG/DL (ref 120–199)
CHOLEST/HDLC SERPL: 4.7 {RATIO} (ref 2–5)
CO2 SERPL-SCNC: 25 MMOL/L (ref 23–29)
CREAT SERPL-MCNC: 0.8 MG/DL (ref 0.5–1.4)
DIFFERENTIAL METHOD: NORMAL
EOSINOPHIL # BLD AUTO: 0 K/UL (ref 0–0.5)
EOSINOPHIL NFR BLD: 0.4 % (ref 0–8)
ERYTHROCYTE [DISTWIDTH] IN BLOOD BY AUTOMATED COUNT: 12.8 % (ref 11.5–14.5)
EST. GFR  (AFRICAN AMERICAN): >60 ML/MIN/1.73 M^2
EST. GFR  (NON AFRICAN AMERICAN): >60 ML/MIN/1.73 M^2
GLUCOSE SERPL-MCNC: 80 MG/DL (ref 70–110)
HCG INTACT+B SERPL-ACNC: <1.2 MIU/ML
HCT VFR BLD AUTO: 38.1 % (ref 37–48.5)
HDLC SERPL-MCNC: 56 MG/DL (ref 40–75)
HDLC SERPL: 21.5 % (ref 20–50)
HGB BLD-MCNC: 12.5 G/DL (ref 12–16)
IMM GRANULOCYTES # BLD AUTO: 0.01 K/UL (ref 0–0.04)
IMM GRANULOCYTES NFR BLD AUTO: 0.1 % (ref 0–0.5)
LDLC SERPL CALC-MCNC: 171.4 MG/DL (ref 63–159)
LYMPHOCYTES # BLD AUTO: 3 K/UL (ref 1–4.8)
LYMPHOCYTES NFR BLD: 30.4 % (ref 18–48)
MCH RBC QN AUTO: 30.3 PG (ref 27–31)
MCHC RBC AUTO-ENTMCNC: 32.8 G/DL (ref 32–36)
MCV RBC AUTO: 93 FL (ref 82–98)
MONOCYTES # BLD AUTO: 0.5 K/UL (ref 0.3–1)
MONOCYTES NFR BLD: 5.5 % (ref 4–15)
NEUTROPHILS # BLD AUTO: 6.1 K/UL (ref 1.8–7.7)
NEUTROPHILS NFR BLD: 63.2 % (ref 38–73)
NONHDLC SERPL-MCNC: 205 MG/DL
NRBC BLD-RTO: 0 /100 WBC
PLATELET # BLD AUTO: 327 K/UL (ref 150–350)
PMV BLD AUTO: 11.2 FL (ref 9.2–12.9)
POTASSIUM SERPL-SCNC: 3.9 MMOL/L (ref 3.5–5.1)
PROT SERPL-MCNC: 7.3 G/DL (ref 6–8.4)
RBC # BLD AUTO: 4.12 M/UL (ref 4–5.4)
SODIUM SERPL-SCNC: 138 MMOL/L (ref 136–145)
TRIGL SERPL-MCNC: 168 MG/DL (ref 30–150)
WBC # BLD AUTO: 9.69 K/UL (ref 3.9–12.7)

## 2019-04-04 PROCEDURE — 36415 COLL VENOUS BLD VENIPUNCTURE: CPT

## 2019-04-04 PROCEDURE — 85025 COMPLETE CBC W/AUTO DIFF WBC: CPT

## 2019-04-04 PROCEDURE — 80061 LIPID PANEL: CPT

## 2019-04-04 PROCEDURE — 99214 OFFICE O/P EST MOD 30 MIN: CPT | Mod: S$GLB,,, | Performed by: PHYSICIAN ASSISTANT

## 2019-04-04 PROCEDURE — 99999 PR PBB SHADOW E&M-EST. PATIENT-LVL II: ICD-10-PCS | Mod: PBBFAC,,, | Performed by: PHYSICIAN ASSISTANT

## 2019-04-04 PROCEDURE — 99214 PR OFFICE/OUTPT VISIT, EST, LEVL IV, 30-39 MIN: ICD-10-PCS | Mod: S$GLB,,, | Performed by: PHYSICIAN ASSISTANT

## 2019-04-04 PROCEDURE — 80053 COMPREHEN METABOLIC PANEL: CPT

## 2019-04-04 PROCEDURE — 99999 PR PBB SHADOW E&M-EST. PATIENT-LVL II: CPT | Mod: PBBFAC,,, | Performed by: PHYSICIAN ASSISTANT

## 2019-04-04 PROCEDURE — 84702 CHORIONIC GONADOTROPIN TEST: CPT

## 2019-04-04 RX ORDER — TRIAMCINOLONE ACETONIDE 0.25 MG/G
CREAM TOPICAL 2 TIMES DAILY
Qty: 30 G | Refills: 0 | Status: SHIPPED | OUTPATIENT
Start: 2019-04-04 | End: 2021-06-01

## 2019-04-04 NOTE — PROGRESS NOTES
Subjective:       Patient ID:  Terra Hamilton is a 23 y.o. female who presents for   Chief Complaint   Patient presents with    Acne     f/u on accutane     Hx of acne of face and posterior neck with scarring and a hx of epilepsy on lamotrigine and ethosuximide (followed by Dr. Sloan Elizabeth in [place], [place] and last seen 1/7/19. Has been followed for acne by Dr. Caldwell and had evaluation and discussion for accutane at last visit on 12/11/18, baseline labs with elevated lipids and negative hcg, on Ortho TriCyclen. Patient states was medically cleared by Dr. Elizabeth for accutane.  S/P month #1 of accutane. Current tx: accutane 40 mg qd.      Denies HA, change in vision, nosebleeds, GI upset, change in mood, thoughts of suicide, or arthralgias. +Dry skin and lips, using Vanicream Gentle cleanser and lip balm, lubriderm for body. Requesting recommendation for facial moisturizer. + New rash of bilateral axilla, + redness, burning, painful. Admits to changing to new deodorant 2 weeks prior to rash.    Previous tx: spironolactone, oral ABX, tazorac 0.05% cream, [facility] acne cleanser, and OCP  FHX of cystic acne (sister, s/p accutane); denies FHX of IBD  PMHX: negative for IBD, but + IBS- chronic constipation on amitiza; history or facial redness after using Cera Ve cleanser     ipledge ID# is [id number]           Review of Systems   Constitutional: Negative for fever and chills.   HENT: Negative for nosebleeds and headaches.    Eyes: Negative for eye irritation and visual change.   Respiratory: Negative for cough and shortness of breath.    Gastrointestinal: Negative for nausea, vomiting and diarrhea.   Skin: Positive for itching, rash, dry skin and dry lips. Negative for daily sunscreen use, activity-related sunscreen use and recent sunburn.   Neurological: Negative for headaches.   Hematologic/Lymphatic: Does not bruise/bleed easily.        Objective:    Physical Exam   Constitutional: She appears well-developed  and well-nourished. No distress.   Neurological: She is alert and oriented to person, place, and time. She is not disoriented.   Psychiatric: She has a normal mood and affect.   Skin:   Areas Examined (abnormalities noted in diagram):   Scalp / Hair Palpated and Inspected  Head / Face Inspection Performed  Neck Inspection Performed  Chest / Axilla Inspection Performed  Back Inspection Performed  RUE Inspected  LUE Inspection Performed                   Diagram Legend     Erythematous scaling macule/papule c/w actinic keratosis       Vascular papule c/w angioma      Pigmented verrucoid papule/plaque c/w seborrheic keratosis      Yellow umbilicated papule c/w sebaceous hyperplasia      Irregularly shaped tan macule c/w lentigo     1-2 mm smooth white papules consistent with Milia      Movable subcutaneous cyst with punctum c/w epidermal inclusion cyst      Subcutaneous movable cyst c/w pilar cyst      Firm pink to brown papule c/w dermatofibroma      Pedunculated fleshy papule(s) c/w skin tag(s)      Evenly pigmented macule c/w junctional nevus     Mildly variegated pigmented, slightly irregular-bordered macule c/w mildly atypical nevus      Flesh colored to evenly pigmented papule c/w intradermal nevus       Pink pearly papule/plaque c/w basal cell carcinoma      Erythematous hyperkeratotic cursted plaque c/w SCC      Surgical scar with no sign of skin cancer recurrence      Open and closed comedones      Inflammatory papules and pustules      Verrucoid papule consistent consistent with wart     Erythematous eczematous patches and plaques     Dystrophic onycholytic nail with subungual debris c/w onychomycosis     Umbilicated papule    Erythematous-base heme-crusted tan verrucoid plaque consistent with inflamed seborrheic keratosis     Erythematous Silvery Scaling Plaque c/w Psoriasis     See annotation      Assessment / Plan:        Acne vulgaris  High risk medication use  -     CBC auto differential; Future;  Expected date: 04/04/2019  -     Comprehensive metabolic panel; Future; Expected date: 04/04/2019  -     hCG, quantitative; Future; Expected date: 04/04/2019  -     Lipid panel; Future; Expected date: 04/04/2019  Dry lips  Dry skin  S/p month #1 Accutane 40 mg qd. Continue low dose accutane (Accutane 40 mg q daily), if labs within normal limits, for at least one more month to prevent flaring. Will have patient see Dr. Raza for next visit. Would consider increasing dose to target of 80 mg q daily at that time if patient doing well with minimal side effects. Ipledge counseling done. Current cumulative dose = 1,200 mg. Current weight is approximately 110 kg, target dose 80 mg daily divided BID (approx. 0.5 - 1 mg/kg/d). Target total = 13,200 - 16,500 (120-150 mg/kg). Reviewed two forms of birth control: OCP and condoms with spermicide. Side effect profile reviewed. #30 day prescription will be provided once labs reviewed. Return to clinic in 4 weeks and will repeat CBC, CMP, hcg and fasting lipid panel.      Continue gentle skin care with Vanicream Gentle Cleanser. Recommend Vanicream moisturizer (samples of vanicream cream and lite lotion provided) for face. Continue liberal emollients for lips.    Accutane is discussed fully with the patient. It is a very effective drug to treat acne vulgaris but has many significant side effects. Chief among these are teratogenesis, hepatic injury, dyslipidemia and severe drying of the mucous membranes. All of these issues have been discussed in details. Monthly blood tests to monitor lipids and liver functions will be necessary. Expect painful dryness and/or fissuring around the lips, eyes, and other moist areas of the body. Balms may be protective. Contact lens may be too painful to wear temporarily while on this drug. Episodes of significant depression have been reported, including suicidal ideation and attempts in rare cases. It may also cause pseudotumor cerebri and  hyperostosis. Relationship to inflammatory bowel disease was discussed with patient. The patient will report any such changes in mood, depressive symptoms or suicidal thoughts, headaches, joint or bone pains. Ipledge counseling done. Side effect profile reviewed. #30 day prescription will be provided once labs reviewed.    Contact dermatitis, unspecified contact dermatitis type, unspecified trigger  -     triamcinolone acetonide 0.025% (KENALOG) 0.025 % cream; Apply topically 2 (two) times daily. For underarm rash.  Dispense: 30 g; Refill: 0  Discussed dx and tx options. Recommend only hypoallergenic deodorant (Almay or Darion's).  Start above med bid prn.  Avoid shaving for now until improved. +History of redness/rash of face with Cera Ve cleanser in the past and now with underarm rash, would consider patch testing in future, if she continues to experience recurrence.           Follow up in about 1 month (around 5/2/2019) for to see Dermatology MD, acne, and then f/u with PA 2 months for acne

## 2019-04-04 NOTE — PATIENT INSTRUCTIONS
Use a mild gentle soap such as Dove for Sensitive Skin or Cetaphil Gentle Cleanser.  Recommend fragrance free and hypoallergenic skin care products.  Shower or bathe once daily. Limit duration to 5 minutes with lukewarm water.  Apply moisturizer immediately after showering.  Some good moisturizers to try are Cetaphil, Vanicream.

## 2019-04-08 DIAGNOSIS — L70.0 ACNE VULGARIS: Primary | ICD-10-CM

## 2019-04-08 DIAGNOSIS — Z79.899 HIGH RISK MEDICATION USE: ICD-10-CM

## 2019-04-08 RX ORDER — ISOTRETINOIN 40 MG/1
40 CAPSULE ORAL DAILY
Qty: 30 CAPSULE | Refills: 0 | Status: SHIPPED | OUTPATIENT
Start: 2019-04-08 | End: 2019-07-11

## 2019-04-16 ENCOUNTER — OFFICE VISIT (OUTPATIENT)
Dept: PODIATRY | Facility: CLINIC | Age: 24
End: 2019-04-16
Payer: COMMERCIAL

## 2019-04-16 VITALS — HEIGHT: 61 IN | WEIGHT: 252 LBS | BODY MASS INDEX: 47.58 KG/M2

## 2019-04-16 DIAGNOSIS — B07.0 PLANTAR WART: Primary | ICD-10-CM

## 2019-04-16 PROCEDURE — 99213 OFFICE O/P EST LOW 20 MIN: CPT | Mod: 25,S$GLB,,

## 2019-04-16 PROCEDURE — 99213 PR OFFICE/OUTPT VISIT, EST, LEVL III, 20-29 MIN: ICD-10-PCS | Mod: 25,S$GLB,,

## 2019-04-16 PROCEDURE — 3008F BODY MASS INDEX DOCD: CPT | Mod: CPTII,S$GLB,,

## 2019-04-16 PROCEDURE — 99999 PR PBB SHADOW E&M-EST. PATIENT-LVL II: ICD-10-PCS | Mod: PBBFAC,,,

## 2019-04-16 PROCEDURE — 17110 PR DESTRUCTION BENIGN LESIONS UP TO 14: ICD-10-PCS | Mod: S$GLB,,,

## 2019-04-16 PROCEDURE — 3008F PR BODY MASS INDEX (BMI) DOCUMENTED: ICD-10-PCS | Mod: CPTII,S$GLB,,

## 2019-04-16 PROCEDURE — 17110 DESTRUCTION B9 LES UP TO 14: CPT | Mod: S$GLB,,,

## 2019-04-16 PROCEDURE — 99999 PR PBB SHADOW E&M-EST. PATIENT-LVL II: CPT | Mod: PBBFAC,,,

## 2019-04-16 NOTE — PROGRESS NOTES
Subjective:       Chief Complaint: Wart (Lt foot )    HPI:     Patient returns for f/u to plantar's warts.    Review of Systems:    Vascular : negative for edema and bruising  Respiratory: negative for cough or shortness of breath  Musculoskeletal: negative for joint pain and edema  Skin: negative for rashes, open wounds, + lesion plantar foot  Neurological: negative for burning, tingling and numbness  All other negative.        Objective:      Physical Exam      Constitutional:  Patient is oriented to person, place, and time. Vital signs are normal.  Appears well-developed and well-nourished.     Vascular:  Dorsalis pedis pulses are 2+ on the right side, and 2+ on the left side.   Posterior tibial pulses are 2+ on the right side, and 2+ on the left side.   + digital hair growth, no swelling, capillary fill time to all toes <3 seconds    Skin/Dermatological:  Skin is warm and intact. No rash noted. No cyanosis or clubbing. No open wounds. Verrucous lesion left lateral 5th MTH, left 4th MTH    Musculoskeletal:      Normal range of motion bilateral ankle and pedal joints, no swelling or deformity, no tenderness or crepitus. Achilles tendon exhibits no pain or defects.        Neurological:  No deficits in 2 point tactile discrimination, vibratory, light touch or pressure.       Assessment:        Encounter Diagnosis   Name Primary?    Plantar wart Yes           Plan:     Discussion with patient regarding treatment options, etiology. Candida injected, canthardone applied, wash off this evening. Patient was encouraged to keep the area clean and dry and stay off that area such as possible. Topical Compound W daily. Tylenol p.r.n. for pain.  RTC 2 weeks.

## 2019-04-22 ENCOUNTER — LAB VISIT (OUTPATIENT)
Dept: LAB | Facility: HOSPITAL | Age: 24
End: 2019-04-22
Payer: COMMERCIAL

## 2019-04-22 DIAGNOSIS — Z79.899 HIGH RISK MEDICATION USE: ICD-10-CM

## 2019-04-22 LAB
ALBUMIN SERPL BCP-MCNC: 3.5 G/DL (ref 3.5–5.2)
ALP SERPL-CCNC: 69 U/L (ref 55–135)
ALT SERPL W/O P-5'-P-CCNC: 65 U/L (ref 10–44)
ANION GAP SERPL CALC-SCNC: 9 MMOL/L (ref 8–16)
AST SERPL-CCNC: 38 U/L (ref 10–40)
BILIRUB SERPL-MCNC: 0.4 MG/DL (ref 0.1–1)
BUN SERPL-MCNC: 13 MG/DL (ref 6–20)
CALCIUM SERPL-MCNC: 9.5 MG/DL (ref 8.7–10.5)
CHLORIDE SERPL-SCNC: 106 MMOL/L (ref 95–110)
CO2 SERPL-SCNC: 24 MMOL/L (ref 23–29)
CREAT SERPL-MCNC: 0.8 MG/DL (ref 0.5–1.4)
EST. GFR  (AFRICAN AMERICAN): >60 ML/MIN/1.73 M^2
EST. GFR  (NON AFRICAN AMERICAN): >60 ML/MIN/1.73 M^2
GLUCOSE SERPL-MCNC: 82 MG/DL (ref 70–110)
POTASSIUM SERPL-SCNC: 4.1 MMOL/L (ref 3.5–5.1)
PROT SERPL-MCNC: 7.2 G/DL (ref 6–8.4)
SODIUM SERPL-SCNC: 139 MMOL/L (ref 136–145)

## 2019-04-22 PROCEDURE — 36415 COLL VENOUS BLD VENIPUNCTURE: CPT

## 2019-04-22 PROCEDURE — 80053 COMPREHEN METABOLIC PANEL: CPT

## 2019-05-02 ENCOUNTER — OFFICE VISIT (OUTPATIENT)
Dept: DERMATOLOGY | Facility: CLINIC | Age: 24
End: 2019-05-02
Payer: COMMERCIAL

## 2019-05-02 ENCOUNTER — DOCUMENTATION ONLY (OUTPATIENT)
Dept: TRANSPLANT | Facility: CLINIC | Age: 24
End: 2019-05-02

## 2019-05-02 DIAGNOSIS — L70.0 ACNE VULGARIS: Primary | ICD-10-CM

## 2019-05-02 DIAGNOSIS — R74.8 ELEVATED LIVER ENZYMES: ICD-10-CM

## 2019-05-02 PROCEDURE — 99213 PR OFFICE/OUTPT VISIT, EST, LEVL III, 20-29 MIN: ICD-10-PCS | Mod: S$GLB,,, | Performed by: DERMATOLOGY

## 2019-05-02 PROCEDURE — 99999 PR PBB SHADOW E&M-EST. PATIENT-LVL III: ICD-10-PCS | Mod: PBBFAC,,, | Performed by: DERMATOLOGY

## 2019-05-02 PROCEDURE — 99213 OFFICE O/P EST LOW 20 MIN: CPT | Mod: S$GLB,,, | Performed by: DERMATOLOGY

## 2019-05-02 PROCEDURE — 99999 PR PBB SHADOW E&M-EST. PATIENT-LVL III: CPT | Mod: PBBFAC,,, | Performed by: DERMATOLOGY

## 2019-05-02 RX ORDER — DOXYCYCLINE 100 MG/1
CAPSULE ORAL
Qty: 30 CAPSULE | Refills: 2 | Status: SHIPPED | OUTPATIENT
Start: 2019-05-02 | End: 2019-09-18

## 2019-05-02 NOTE — LETTER
May 2, 2019    Terra Hamilton  [address] [place] [id number]      Dear Terra Hamilton:    Your doctor has referred you to the [facility]. We are sending this letter to remind you to make an appointment with us to complete the referral process.     Please call us at [phone] to schedule an appointment. We look forward to seeing you soon.     If you received a call and have been scheduled, please disregard this letter.       Sincerely,        [facility]   [address] [place] [id number]  [phone]

## 2019-05-02 NOTE — NURSING
Pt records reviewed.   Pt will be referred to Hepatology.  Elevated liver enzymes  Initial referral received  from the [facility].   Mel Raza MD  Referring Provider/diagnosis        Referral letter sent to patient.

## 2019-05-02 NOTE — LETTER
May 2, 2019    Mel Raza MD  [address]  [place] [address]      Dear Dr. Raza    Patient: Terra Hamilton   MR Number: [id number]   YOB: 1995     Thank you for the referral of Terra Hamilton to the [facility] program. An initial appointment will be scheduled for your patient with one of our Hepatologists.      Thank you again for your trust in our program.  If there is anything we can do for you or your staff, please feel free to contact us.        Sincerely,        [facility]  [address] [place] [id number]  [phone]

## 2019-05-02 NOTE — PROGRESS NOTES
Subjective:       Patient ID:  Terra Hamilton is a 23 y.o. female who presents for   Chief Complaint   Patient presents with    Rash     f/u on rash to armpits    Acne     f/u on accutane     Hx of IBS, acne of face and posterior neck with scarring and a hx of epilepsy on lamotrigine and ethosuximide (medically cleared by Dr. Sloan Elizabeth, neurologist in [place], [place] in writing to start accutane), last seen on 4/4/19 by SINDHU Lopez, previously seen by Dr. Bowers.  She is s/p month #2 of accutane, however discontinued on 4/23 due to elevation in ALT to 65 on 4/22 and 52 on 4/4.  She does have a hx of elevated CHL, TG and LDL, 261, 168 and 171 respectively.    Sister with history of fatty liver.      Denies HA, emesis, change in vision, nosebleeds, GI upset, change in mood, thoughts of suicide, or arthralgias. Dry skin and lips improved since d/cing accutane, using Vanicream Gentle cleanser and lip balm, lubriderm for body. + mood swings. Requesting recommendation for facial moisturizer.     Previous tx: spironolactone, oral ABX, tazorac 0.05% cream, [facility] acne cleanser, and OCP  FHX of cystic acne (sister, s/p accutane); denies FHX of IBD  PMHX: negative for IBD, but + IBS- chronic constipation on amitiza; history or facial redness after using Cera Ve cleanser     ipledge ID# is [id number]          Review of Systems   Constitutional: Negative for fever and chills.   Gastrointestinal: Negative for nausea and vomiting.   Skin: Negative for daily sunscreen use, activity-related sunscreen use and recent sunburn.   Hematologic/Lymphatic: Does not bruise/bleed easily.        Objective:    Physical Exam   Constitutional: She appears well-developed and well-nourished. No distress.   Neurological: She is alert and oriented to person, place, and time. She is not disoriented.   Psychiatric: She has a normal mood and affect.   Skin:   Areas Examined (abnormalities noted in diagram):   Head / Face Inspection  Performed  Neck Inspection Performed  Chest / Axilla Inspection Performed  Abdomen Inspection Performed  Back Inspection Performed  RUE Inspected  LUE Inspection Performed  Nails and Digits Inspection Performed                   Diagram Legend      Open and closed comedones      Inflammatory papules and pustules       Assessment / Plan:        Acne vulgaris  -     doxycycline (MONODOX) 100 MG capsule; Take once daily with food.  May cause upset stomach.  Dispense: 30 capsule; Refill: 2  -     tazarotene (TAZORAC) 0.05 % Crea cream; Thin film to face qhs, take night off if irritation develops  Dispense: 60 g; Refill: 3  -     Discussed that given elevation in liver enzymes with accutane and elevation of CHL prior to starting accutane, discussed that accutane may not be the safest tx option for the pt.  Will refer to hepatology to eval for liver dysfunction or fatty liver.  Will start doxy and restart tazorac.  Side effect profile of doxy reviewed including increased sun sensitivity and upset stomach.  Patient was instructed to not become pregnant while on medication to effects on dental development in fetus; she acknowledged understanding of risks involved.     Elevated liver enzymes  -     Ambulatory Referral to Hepatology             Follow up in about 2 months (around 7/2/2019).

## 2019-05-07 RX ORDER — LUBIPROSTONE 24 UG/1
24 CAPSULE, GELATIN COATED ORAL 2 TIMES DAILY
Qty: 60 CAPSULE | Refills: 3 | Status: SHIPPED | OUTPATIENT
Start: 2019-05-07 | End: 2019-09-09 | Stop reason: SDUPTHER

## 2019-07-08 ENCOUNTER — OFFICE VISIT (OUTPATIENT)
Dept: DERMATOLOGY | Facility: CLINIC | Age: 24
End: 2019-07-08
Payer: COMMERCIAL

## 2019-07-08 DIAGNOSIS — L70.0 ACNE VULGARIS: ICD-10-CM

## 2019-07-08 DIAGNOSIS — L73.0 ACNE KELOIDALIS NUCHAE: Primary | ICD-10-CM

## 2019-07-08 PROCEDURE — 99999 PR PBB SHADOW E&M-EST. PATIENT-LVL II: ICD-10-PCS | Mod: PBBFAC,,, | Performed by: PHYSICIAN ASSISTANT

## 2019-07-08 PROCEDURE — 99999 PR PBB SHADOW E&M-EST. PATIENT-LVL II: CPT | Mod: PBBFAC,,, | Performed by: PHYSICIAN ASSISTANT

## 2019-07-08 PROCEDURE — 99213 OFFICE O/P EST LOW 20 MIN: CPT | Mod: S$GLB,,, | Performed by: PHYSICIAN ASSISTANT

## 2019-07-08 PROCEDURE — 99213 PR OFFICE/OUTPT VISIT, EST, LEVL III, 20-29 MIN: ICD-10-PCS | Mod: S$GLB,,, | Performed by: PHYSICIAN ASSISTANT

## 2019-07-08 RX ORDER — CLOBETASOL PROPIONATE 0.46 MG/ML
SOLUTION TOPICAL
Qty: 25 ML | Refills: 0 | Status: SHIPPED | OUTPATIENT
Start: 2019-07-08 | End: 2020-01-29 | Stop reason: SDUPTHER

## 2019-07-08 RX ORDER — CLINDAMYCIN PHOSPHATE 10 MG/G
GEL TOPICAL
Qty: 60 G | Refills: 3 | Status: SHIPPED | OUTPATIENT
Start: 2019-07-08 | End: 2020-01-29 | Stop reason: SDUPTHER

## 2019-07-08 NOTE — PROGRESS NOTES
Subjective:       Patient ID:  Terra Hamilton is a 23 y.o. female who presents for   Chief Complaint   Patient presents with    Acne     f/u on acne to face     Hx of IBS, acne of face and posterior neck with scarring and a hx of epilepsy on lamotrigine and ethosuximide (medically cleared by Dr. Sloan Elizabeth, neurologist in [place], [place] in writing to start accutane), last seen on 5/2/19 by Dr. Raza, previously seen by me 4/4/19 SINDHU Lopez and previously seen by Dr. Bowers.  She is s/p two months of accutane, however discontinued on 4/23 due to elevation in ALT to 65 on 4/22 and 52 on 4/4.  She does have a hx of elevated CHL, TG and LDL, 261, 168 and 171 respectively. Referred to hepatology at last visit and awaiting consultation appointment.      Current acne tx: doxy qd and tazorac qod (has not gotten new rx for tazorac from [facility]). + Improvement. Denies GI upset or skin irritation.      Sister with history of fatty liver.       Previous tx: spironolactone, oral ABX, tazorac 0.05% cream, [facility] acne cleanser, and OCP  FHX of cystic acne (sister, s/p accutane); denies FHX of IBD  PMHX: negative for IBD, but + IBS- chronic constipation on amitiza; history or facial redness after using Cera Ve cleanser     ipledge ID# is [id number]          Review of Systems   Constitutional: Negative for fever and chills.   Gastrointestinal: Negative for nausea and vomiting.   Skin: Positive for activity-related sunscreen use. Negative for itching, rash, dry skin, daily sunscreen use and recent sunburn.   Hematologic/Lymphatic: Does not bruise/bleed easily.        Objective:    Physical Exam   Constitutional: She appears well-developed and well-nourished. No distress.   Neurological: She is alert and oriented to person, place, and time. She is not disoriented.   Psychiatric: She has a normal mood and affect.   Skin:   Areas Examined (abnormalities noted in diagram):   Scalp / Hair Palpated and Inspected  Head / Face  Inspection Performed  Neck Inspection Performed  Chest / Axilla Inspection Performed  Back Inspection Performed  RUE Inspected  LUE Inspection Performed                   Diagram Legend     Erythematous scaling macule/papule c/w actinic keratosis       Vascular papule c/w angioma      Pigmented verrucoid papule/plaque c/w seborrheic keratosis      Yellow umbilicated papule c/w sebaceous hyperplasia      Irregularly shaped tan macule c/w lentigo     1-2 mm smooth white papules consistent with Milia      Movable subcutaneous cyst with punctum c/w epidermal inclusion cyst      Subcutaneous movable cyst c/w pilar cyst      Firm pink to brown papule c/w dermatofibroma      Pedunculated fleshy papule(s) c/w skin tag(s)      Evenly pigmented macule c/w junctional nevus     Mildly variegated pigmented, slightly irregular-bordered macule c/w mildly atypical nevus      Flesh colored to evenly pigmented papule c/w intradermal nevus       Pink pearly papule/plaque c/w basal cell carcinoma      Erythematous hyperkeratotic cursted plaque c/w SCC      Surgical scar with no sign of skin cancer recurrence      Open and closed comedones      Inflammatory papules and pustules      Verrucoid papule consistent consistent with wart     Erythematous eczematous patches and plaques     Dystrophic onycholytic nail with subungual debris c/w onychomycosis     Umbilicated papule    Erythematous-base heme-crusted tan verrucoid plaque consistent with inflamed seborrheic keratosis     Erythematous Silvery Scaling Plaque c/w Psoriasis     See annotation      Assessment / Plan:        Acne keloidalis nuchae  -     clindamycin phosphate 1% (CLINDAGEL) 1 % gel; AAA of face qd for acne.  Dispense: 60 g; Refill: 3  -     clobetasol (TEMOVATE) 0.05 % external solution; AAA of scalp bid prn.  Strong steroid- do not apply to face or folds of skin.  Dispense: 25 mL; Refill: 0  Trial of above meds.      Acne vulgaris  -     clindamycin phosphate 1%  (CLINDAGEL) 1 % gel; AAA of face qd for acne.  Dispense: 60 g; Refill: 3  -     tazarotene (TAZORAC) 0.05 % Crea cream; Thin film to face qhs, take night off if irritation develops  Dispense: 60 g; Refill: 3  -     clobetasol (TEMOVATE) 0.05 % external solution; AAA of scalp bid prn.  Strong steroid- do not apply to face or folds of skin.  Dispense: 25 mL; Refill: 0  Improved, will d/c doxy. Start clindagel qd and continue Tazorac qod as tolerated. Will resend rx for tazorac to [facility] since issues with [facility].          Follow up in about 3 months (around 10/8/2019) for acne.

## 2019-07-11 ENCOUNTER — OFFICE VISIT (OUTPATIENT)
Dept: FAMILY MEDICINE | Facility: CLINIC | Age: 24
End: 2019-07-11
Payer: COMMERCIAL

## 2019-07-11 VITALS
WEIGHT: 255.5 LBS | RESPIRATION RATE: 16 BRPM | BODY MASS INDEX: 48.24 KG/M2 | DIASTOLIC BLOOD PRESSURE: 70 MMHG | HEART RATE: 92 BPM | TEMPERATURE: 98 F | HEIGHT: 61 IN | SYSTOLIC BLOOD PRESSURE: 104 MMHG

## 2019-07-11 DIAGNOSIS — E28.2 PCOS (POLYCYSTIC OVARIAN SYNDROME): Primary | ICD-10-CM

## 2019-07-11 PROCEDURE — 99999 PR PBB SHADOW E&M-EST. PATIENT-LVL III: ICD-10-PCS | Mod: PBBFAC,,, | Performed by: FAMILY MEDICINE

## 2019-07-11 PROCEDURE — 3008F BODY MASS INDEX DOCD: CPT | Mod: CPTII,S$GLB,, | Performed by: FAMILY MEDICINE

## 2019-07-11 PROCEDURE — 99214 PR OFFICE/OUTPT VISIT, EST, LEVL IV, 30-39 MIN: ICD-10-PCS | Mod: S$GLB,,, | Performed by: FAMILY MEDICINE

## 2019-07-11 PROCEDURE — 99214 OFFICE O/P EST MOD 30 MIN: CPT | Mod: S$GLB,,, | Performed by: FAMILY MEDICINE

## 2019-07-11 PROCEDURE — 99999 PR PBB SHADOW E&M-EST. PATIENT-LVL III: CPT | Mod: PBBFAC,,, | Performed by: FAMILY MEDICINE

## 2019-07-11 PROCEDURE — 3008F PR BODY MASS INDEX (BMI) DOCUMENTED: ICD-10-PCS | Mod: CPTII,S$GLB,, | Performed by: FAMILY MEDICINE

## 2019-07-11 RX ORDER — AMOXICILLIN 500 MG
1 CAPSULE ORAL DAILY
COMMUNITY
End: 2022-12-02

## 2019-07-11 NOTE — PROGRESS NOTES
THIS DOCUMENT WAS MADE IN PART WITH VOICE RECOGNITION SOFTWARE.  OCCASIONALLY THIS SOFTWARE WILL MISINTERPRET WORDS OR PHRASES.      Terra Valdezabbyshoshana  1995    Terra was seen today for follow-up.    Diagnoses and all orders for this visit:    PCOS (polycystic ovarian syndrome)    BMI 45.0-49.9, adult    Appointment duration greater than 25 min., more than 50% face-to-face counseling      Subjective     Chief Complaint   Patient presents with    Follow-up     patient reports having some right ear pain that started on Monaday she states it seems to be getting better but would like for you to look at it        HPI     follow-up polycystic ovarian disease, insulin resistance, and obesity. Unfortunately she has not lost weight, has had some setbacks. Surgery has been discussed and considering bariatric surgery along with her mother. Although she is somewhat hesitant and not certain if she wishes to proceed. I detailed diet changes that may help, avoiding fad diets, she would like to continue to try this along with the metformin. Will reevaluate in a few months.    Active Ambulatory Problems     Diagnosis Date Noted    Viral gastroenteritis 09/11/2012    Chronic constipation with overflow 09/11/2012    Epilepsy 05/09/2013    Plantar wart 04/04/2017    Pes valgus 04/04/2017    Hallux abducto valgus 04/04/2017    Hallux valgus of right foot 06/01/2018     Resolved Ambulatory Problems     Diagnosis Date Noted    No Resolved Ambulatory Problems     Past Medical History:   Diagnosis Date    Focal seizures     History of polycystic ovarian disease     Seizures     Speech delay          Review of Systems   Constitutional: Positive for fatigue and unexpected weight change.   Respiratory: Negative.    Cardiovascular: Negative.    Gastrointestinal: Negative.    Genitourinary: Negative.        Objective     Physical Exam   Constitutional: She is oriented to person, place, and time. She appears well-developed  "and well-nourished. No distress.   HENT:   Head: Normocephalic and atraumatic.   Eyes: No scleral icterus.   Pulmonary/Chest: Effort normal. No respiratory distress.   Neurological: She is alert and oriented to person, place, and time.   Skin: She is not diaphoretic.   Psychiatric: She has a normal mood and affect. Her behavior is normal.   Vitals reviewed.     Body mass index is 48.28 kg/m².    Vitals:    07/11/19 0926   BP: 104/70   BP Location: Right arm   Patient Position: Sitting   BP Method: X-Large (Manual)   Pulse: 92   Resp: 16   Temp: 98.4 °F (36.9 °C)   TempSrc: Oral   Weight: 115.9 kg (255 lb 8.2 oz)   Height: 5' 1" (1.549 m)       MOST RECENT LABS IN OUR ELECTRONIC MEDICAL RECORD:     Results for orders placed or performed in visit on 04/22/19   Comprehensive metabolic panel   Result Value Ref Range    Sodium 139 136 - 145 mmol/L    Potassium 4.1 3.5 - 5.1 mmol/L    Chloride 106 95 - 110 mmol/L    CO2 24 23 - 29 mmol/L    Glucose 82 70 - 110 mg/dL    BUN, Bld 13 6 - 20 mg/dL    Creatinine 0.8 0.5 - 1.4 mg/dL    Calcium 9.5 8.7 - 10.5 mg/dL    Total Protein 7.2 6.0 - 8.4 g/dL    Albumin 3.5 3.5 - 5.2 g/dL    Total Bilirubin 0.4 0.1 - 1.0 mg/dL    Alkaline Phosphatase 69 55 - 135 U/L    AST 38 10 - 40 U/L    ALT 65 (H) 10 - 44 U/L    Anion Gap 9 8 - 16 mmol/L    eGFR if African American >60.0 >60 mL/min/1.73 m^2    eGFR if non African American >60.0 >60 mL/min/1.73 m^2         "

## 2019-07-15 DIAGNOSIS — Z79.899 HIGH RISK MEDICATION USE: Primary | ICD-10-CM

## 2019-07-15 DIAGNOSIS — L70.0 ACNE VULGARIS: ICD-10-CM

## 2019-07-15 NOTE — PROGRESS NOTES
Subjective:       Patient ID:  Terra Hamilton is a 23 y.o. female who presents for No chief complaint on file.    HPI    Review of Systems     Objective:    Physical Exam       Diagram Legend     Erythematous scaling macule/papule c/w actinic keratosis       Vascular papule c/w angioma      Pigmented verrucoid papule/plaque c/w seborrheic keratosis      Yellow umbilicated papule c/w sebaceous hyperplasia      Irregularly shaped tan macule c/w lentigo     1-2 mm smooth white papules consistent with Milia      Movable subcutaneous cyst with punctum c/w epidermal inclusion cyst      Subcutaneous movable cyst c/w pilar cyst      Firm pink to brown papule c/w dermatofibroma      Pedunculated fleshy papule(s) c/w skin tag(s)      Evenly pigmented macule c/w junctional nevus     Mildly variegated pigmented, slightly irregular-bordered macule c/w mildly atypical nevus      Flesh colored to evenly pigmented papule c/w intradermal nevus       Pink pearly papule/plaque c/w basal cell carcinoma      Erythematous hyperkeratotic cursted plaque c/w SCC      Surgical scar with no sign of skin cancer recurrence      Open and closed comedones      Inflammatory papules and pustules      Verrucoid papule consistent consistent with wart     Erythematous eczematous patches and plaques     Dystrophic onycholytic nail with subungual debris c/w onychomycosis     Umbilicated papule    Erythematous-base heme-crusted tan verrucoid plaque consistent with inflamed seborrheic keratosis     Erythematous Silvery Scaling Plaque c/w Psoriasis     See annotation      Assessment / Plan:        There are no diagnoses linked to this encounter.         No follow-ups on file.

## 2019-07-16 ENCOUNTER — PATIENT MESSAGE (OUTPATIENT)
Dept: DERMATOLOGY | Facility: CLINIC | Age: 24
End: 2019-07-16

## 2019-07-17 ENCOUNTER — TELEPHONE (OUTPATIENT)
Dept: DERMATOLOGY | Facility: CLINIC | Age: 24
End: 2019-07-17

## 2019-07-17 NOTE — TELEPHONE ENCOUNTER
Spoke to pt and informed her that Hematology department will be calling her to schedule appointment. Pt stated that she thinks they already called but she miss their call but stated she will try to call them back. Also, informed pt that I sent her a message through her pt portal with all of the information and department phone number.   ----- Message from Jenelle Salinas sent at 7/17/2019  1:35 PM CDT -----  Contact: [phone]  Would like to consult with the nurse, patient thinks she had a missed call from the nurse, patient would like to speak with the nurse concerning this call, please call back at [phone], thanks sj

## 2019-07-17 NOTE — TELEPHONE ENCOUNTER
Tried to call pt and inform her that liver  Will be calling to schedule appointment. Pt did not answer left voicemail for pt to return call.

## 2019-08-12 ENCOUNTER — OFFICE VISIT (OUTPATIENT)
Dept: HEPATOLOGY | Facility: CLINIC | Age: 24
End: 2019-08-12
Payer: COMMERCIAL

## 2019-08-12 ENCOUNTER — LAB VISIT (OUTPATIENT)
Dept: LAB | Facility: HOSPITAL | Age: 24
End: 2019-08-12
Payer: COMMERCIAL

## 2019-08-12 VITALS
DIASTOLIC BLOOD PRESSURE: 76 MMHG | BODY MASS INDEX: 48.2 KG/M2 | HEIGHT: 61 IN | OXYGEN SATURATION: 100 % | WEIGHT: 255.31 LBS | HEART RATE: 98 BPM | SYSTOLIC BLOOD PRESSURE: 127 MMHG

## 2019-08-12 DIAGNOSIS — R74.8 ELEVATED LIVER ENZYMES: ICD-10-CM

## 2019-08-12 DIAGNOSIS — R74.8 ELEVATED LIVER ENZYMES: Primary | ICD-10-CM

## 2019-08-12 LAB
ALBUMIN SERPL BCP-MCNC: 3.8 G/DL (ref 3.5–5.2)
ALP SERPL-CCNC: 68 U/L (ref 55–135)
ALT SERPL W/O P-5'-P-CCNC: 20 U/L (ref 10–44)
ANION GAP SERPL CALC-SCNC: 10 MMOL/L (ref 8–16)
AST SERPL-CCNC: 18 U/L (ref 10–40)
BASOPHILS # BLD AUTO: 0.04 K/UL (ref 0–0.2)
BASOPHILS NFR BLD: 0.4 % (ref 0–1.9)
BILIRUB DIRECT SERPL-MCNC: 0.2 MG/DL (ref 0.1–0.3)
BILIRUB SERPL-MCNC: 0.4 MG/DL (ref 0.1–1)
BUN SERPL-MCNC: 9 MG/DL (ref 6–20)
CALCIUM SERPL-MCNC: 10.2 MG/DL (ref 8.7–10.5)
CERULOPLASMIN SERPL-MCNC: 52 MG/DL (ref 15–45)
CHLORIDE SERPL-SCNC: 104 MMOL/L (ref 95–110)
CO2 SERPL-SCNC: 25 MMOL/L (ref 23–29)
CREAT SERPL-MCNC: 0.8 MG/DL (ref 0.5–1.4)
DIFFERENTIAL METHOD: ABNORMAL
EOSINOPHIL # BLD AUTO: 0.1 K/UL (ref 0–0.5)
EOSINOPHIL NFR BLD: 0.6 % (ref 0–8)
ERYTHROCYTE [DISTWIDTH] IN BLOOD BY AUTOMATED COUNT: 12.6 % (ref 11.5–14.5)
EST. GFR  (AFRICAN AMERICAN): >60 ML/MIN/1.73 M^2
EST. GFR  (NON AFRICAN AMERICAN): >60 ML/MIN/1.73 M^2
FERRITIN SERPL-MCNC: 14 NG/ML (ref 20–300)
GLUCOSE SERPL-MCNC: 76 MG/DL (ref 70–110)
HBV SURFACE AG SERPL QL IA: NEGATIVE
HCT VFR BLD AUTO: 39.5 % (ref 37–48.5)
HCV AB SERPL QL IA: NEGATIVE
HEPATITIS A ANTIBODY, IGG: NEGATIVE
HGB BLD-MCNC: 12.8 G/DL (ref 12–16)
IMM GRANULOCYTES # BLD AUTO: 0.02 K/UL (ref 0–0.04)
IMM GRANULOCYTES NFR BLD AUTO: 0.2 % (ref 0–0.5)
INR PPP: 1 (ref 0.8–1.2)
IRON SERPL-MCNC: 88 UG/DL (ref 30–160)
LYMPHOCYTES # BLD AUTO: 2.7 K/UL (ref 1–4.8)
LYMPHOCYTES NFR BLD: 28.6 % (ref 18–48)
MCH RBC QN AUTO: 30 PG (ref 27–31)
MCHC RBC AUTO-ENTMCNC: 32.4 G/DL (ref 32–36)
MCV RBC AUTO: 93 FL (ref 82–98)
MONOCYTES # BLD AUTO: 0.5 K/UL (ref 0.3–1)
MONOCYTES NFR BLD: 5.1 % (ref 4–15)
NEUTROPHILS # BLD AUTO: 6.2 K/UL (ref 1.8–7.7)
NEUTROPHILS NFR BLD: 65.1 % (ref 38–73)
NRBC BLD-RTO: 0 /100 WBC
PLATELET # BLD AUTO: 353 K/UL (ref 150–350)
PMV BLD AUTO: 10.6 FL (ref 9.2–12.9)
POTASSIUM SERPL-SCNC: 3.9 MMOL/L (ref 3.5–5.1)
PROT SERPL-MCNC: 7.4 G/DL (ref 6–8.4)
PROTHROMBIN TIME: 10 SEC (ref 9–12.5)
RBC # BLD AUTO: 4.27 M/UL (ref 4–5.4)
SATURATED IRON: 17 % (ref 20–50)
SODIUM SERPL-SCNC: 139 MMOL/L (ref 136–145)
TOTAL IRON BINDING CAPACITY: 517 UG/DL (ref 250–450)
TRANSFERRIN SERPL-MCNC: 349 MG/DL (ref 200–375)
WBC # BLD AUTO: 9.47 K/UL (ref 3.9–12.7)

## 2019-08-12 PROCEDURE — 87340 HEPATITIS B SURFACE AG IA: CPT

## 2019-08-12 PROCEDURE — 82103 ALPHA-1-ANTITRYPSIN TOTAL: CPT

## 2019-08-12 PROCEDURE — 82728 ASSAY OF FERRITIN: CPT

## 2019-08-12 PROCEDURE — 80321 ALCOHOLS BIOMARKERS 1OR 2: CPT

## 2019-08-12 PROCEDURE — 99204 PR OFFICE/OUTPT VISIT, NEW, LEVL IV, 45-59 MIN: ICD-10-PCS | Mod: S$GLB,,, | Performed by: PHYSICIAN ASSISTANT

## 2019-08-12 PROCEDURE — 86803 HEPATITIS C AB TEST: CPT

## 2019-08-12 PROCEDURE — 85610 PROTHROMBIN TIME: CPT

## 2019-08-12 PROCEDURE — 86235 NUCLEAR ANTIGEN ANTIBODY: CPT | Mod: 91

## 2019-08-12 PROCEDURE — 83540 ASSAY OF IRON: CPT

## 2019-08-12 PROCEDURE — 99999 PR PBB SHADOW E&M-EST. PATIENT-LVL V: CPT | Mod: PBBFAC,,, | Performed by: PHYSICIAN ASSISTANT

## 2019-08-12 PROCEDURE — 86256 FLUORESCENT ANTIBODY TITER: CPT

## 2019-08-12 PROCEDURE — 3008F PR BODY MASS INDEX (BMI) DOCUMENTED: ICD-10-PCS | Mod: CPTII,S$GLB,, | Performed by: PHYSICIAN ASSISTANT

## 2019-08-12 PROCEDURE — 99204 OFFICE O/P NEW MOD 45 MIN: CPT | Mod: S$GLB,,, | Performed by: PHYSICIAN ASSISTANT

## 2019-08-12 PROCEDURE — 36415 COLL VENOUS BLD VENIPUNCTURE: CPT

## 2019-08-12 PROCEDURE — 86038 ANTINUCLEAR ANTIBODIES: CPT

## 2019-08-12 PROCEDURE — 80053 COMPREHEN METABOLIC PANEL: CPT

## 2019-08-12 PROCEDURE — 99999 PR PBB SHADOW E&M-EST. PATIENT-LVL V: ICD-10-PCS | Mod: PBBFAC,,, | Performed by: PHYSICIAN ASSISTANT

## 2019-08-12 PROCEDURE — 85025 COMPLETE CBC W/AUTO DIFF WBC: CPT

## 2019-08-12 PROCEDURE — 3008F BODY MASS INDEX DOCD: CPT | Mod: CPTII,S$GLB,, | Performed by: PHYSICIAN ASSISTANT

## 2019-08-12 PROCEDURE — 82390 ASSAY OF CERULOPLASMIN: CPT

## 2019-08-12 PROCEDURE — 86790 VIRUS ANTIBODY NOS: CPT

## 2019-08-12 PROCEDURE — 82248 BILIRUBIN DIRECT: CPT

## 2019-08-12 NOTE — PROGRESS NOTES
I have reviewed and concur with the MIKAYLA's history, physical, assessment, and plan.  I have personally interviewed and examined the patient at bedside.  See below addendum for my evaluation and additional findings.     23 y.o. female that presents for evaluation of elevated liver tests.    No symptoms of chronic liver disease  Plan for serologic work-up  Discussed risk factor for HOROWITZ including BMI 48 and importance of lifestyle modifications    Will trend labs and if ALT remains elevated, will plan to perform MR elastography for fibrosis staging     Patient will return to clinic with SINDHU Jurado

## 2019-08-12 NOTE — LETTER
August 12, 2019      Mel Raza MD  [address]  [place] [address]           [facility] - Hepatology  [address]  [facility] [address]  Phone: [phone]  Fax: [phone]          Patient: Terra Hamilton   MR Number: [id number]   YOB: 1995   Date of Visit: 8/12/2019       Dear Dr. Mel Raza:    Thank you for referring Terra Hamilton to me for evaluation. Attached you will find relevant portions of my assessment and plan of care.    If you have questions, please do not hesitate to call me. I look forward to following Terra Hamilton along with you.    Sincerely,    García Manuel PA-C    Enclosure  CC:  No Recipients    If you would like to receive this communication electronically, please contact [email or web address] or [phone] to request more information on [facility] access.    For providers and/or their staff who would like to refer a patient to [facility], please contact us through our one-stop-shop provider referral line, [facility], at [phone].    If you feel you have received this communication in error or would no longer like to receive these types of communications, please e-mail [email or web address]

## 2019-08-12 NOTE — PROGRESS NOTES
HEPATOLOGY CLINIC VISIT NOTE     REFERRING PROVIDER: Dr. Mel Raza    REASON FOR VISIT: elevated liver enzymes    HISTORY: This is a 23 y.o. White female here for evaluation of elevated liver enzymes, referred by dermatology. Pt recently started on Accutane, d/nita in April due to elevated liver enzymes. On most recent labs AST 38, ALT 65. Liver enzymes elevated since 04/2019, previously normal. Several family members with NAFLD/HOROWITZ. NO recent abdominal imaging, no formal fibrosis staging.  HBcAb, HBsAb (+) in 2016.     PMH of PCOS. BMI is 48     Pt denies signs of hepatic decompensation including: jaundice, dark urine, abdominal distention, hematemesis, melena, slowed mentation.    Liver staging:  No formal staging  Albumin 3.6 3.5   BILIRUBIN TOTAL 0.3 0.4   AST 29 38   ALT 52 (H) 65 (H)           Past Medical History:   Diagnosis Date    Focal seizures     History of polycystic ovarian disease     Seizures     2013    Speech delay      Past Surgical History:   Procedure Laterality Date    BUNIONECTOMY Right 6/1/2018    [facility]    CORRECTION, HAMMER TOE Right 6/1/2018    [facility] OR    FOOT SURGERY      Left 5th toe PIPJ ARTHROPLASTY (25337) Left 7/26/2017    [facility]    Left Travis BUNIONECTOMY (01356) Left 7/26/2017    [facility]    ORIF Subtalar joint with Hyprocure (58811) Left 7/26/2017    [facility]    orif subtalar joint with Hyprocure implant  Right 6/1/2018    [facility] OR    TONSILLECTOMY, ADENOIDECTOMY, BILATERAL MYRINGOTOMY AND TUBES      WISDOM TOOTH EXTRACTION       FAMILY HISTORY:   Mother, M grandmother, Aunt with fatty liver     SOCIAL HISTORY:   In College, studying animal science at [employer]    Social History     Tobacco Use   Smoking Status Never Smoker    Smokeless Tobacco Never Used     Social History     Substance and Sexual Activity   Alcohol Use Yes    Comment: only on special occasions     Social History     Substance and Sexual Activity   Drug Use No     ROS:   No fever, chills, fatigue  No chest pain, dyspnea, cough  No abdominal pain, nausea, vomiting  No headaches, visual changes  No lower extremity edema  No depression or anxiety      PHYSICAL EXAM:  Friendly White female, in no acute distress; alert and oriented to person, place and time  VITALS: reviewed  HEENT: Sclerae anicteric.   NECK: Supple  CVS: Regular rate and rhythm. No murmurs  LUNGS: Normal respiratory effort. Clear bilaterally  ABDOMEN: Flat, soft, nontender. No organomegaly or masses.  SKIN: Warm and dry. No jaundice, No obvious rashes.   EXTREMITIES: No lower extremity edema  NEURO/PSYCH: Normal gate. Memory intact. Thought and speech pattern appropriate. Behavior normal. No depression or anxiety noted.    RECENT LABS:  Lab Results   Component Value Date    WBC 9.69 04/04/2019    HGB 12.5 04/04/2019     04/04/2019     No results found for: INR  Lab Results   Component Value Date    AST 38 04/22/2019    ALT 65 (H) 04/22/2019    BILITOT 0.4 04/22/2019    ALBUMIN 3.5 04/22/2019    ALKPHOS 69 04/22/2019    CREATININE 0.8 04/22/2019    BUN 13 04/22/2019     04/22/2019    K 4.1 04/22/2019     RECENT IMAGING:  None    ASSESSMENT  23 y.o. White female with:  1. ELEVATED ALT  -- previously normal, elevated x 2 in 04/2019  -- suspect NAFLD/HOROWITZ  -- serological eval  -- trend enzymes  -- if persistent elevation >6 months, stage fibrosis with MR elastography  -- discussed risk factors for NAFLD/HOROWITZ, diet, exercise and weight loss     EDUCATION:  We discussed the manifestations of NAFLD. At this time there is no FDA approved therapy for NAFLD.   The patient and I discussed the importance of diet, exercise, and weight loss for management of NAFLD. We discussed a low fat, low carb/low  sugar, high fiber diet, and a goal of 30 minutes of exercise 5 times per week.   Pt is aware that fatty liver can progress to steatohepatitis and possibly to cirrhosis, putting one at increased risk for liver cancer, liver failure, and death.        PLAN:  1. Labs today  2. Trend enzymes, f/u TBD     Thank you for allowing me to participate in the care of Terra Manuel PA-C

## 2019-08-13 LAB
ANA SER QL IF: NORMAL
MITOCHONDRIA AB TITR SER IF: NORMAL {TITER}
SMOOTH MUSCLE AB TITR SER IF: NORMAL {TITER}

## 2019-08-15 LAB
A1AT PHENOTYP SERPL-IMP: ABNORMAL BANDS
A1AT SERPL NEPH-MCNC: 215 MG/DL (ref 100–190)

## 2019-08-16 ENCOUNTER — HOSPITAL ENCOUNTER (OUTPATIENT)
Dept: RADIOLOGY | Facility: HOSPITAL | Age: 24
Discharge: HOME | End: 2019-08-16
Attending: PHYSICIAN ASSISTANT
Payer: COMMERCIAL

## 2019-08-16 DIAGNOSIS — R74.8 ELEVATED LIVER ENZYMES: ICD-10-CM

## 2019-08-16 PROCEDURE — 76700 US EXAM ABDOM COMPLETE: CPT | Mod: 26,,, | Performed by: RADIOLOGY

## 2019-08-16 PROCEDURE — 76700 US ABDOMEN COMPLETE: ICD-10-PCS | Mod: 26,,, | Performed by: RADIOLOGY

## 2019-08-16 PROCEDURE — 76700 US EXAM ABDOM COMPLETE: CPT | Mod: TC,PO

## 2019-08-19 LAB — PHOSPHATIDYLETHANOL (PETH): NEGATIVE NG/ML

## 2019-08-20 ENCOUNTER — TELEPHONE (OUTPATIENT)
Dept: HEPATOLOGY | Facility: CLINIC | Age: 24
End: 2019-08-20

## 2019-08-20 DIAGNOSIS — R74.8 ELEVATED LIVER ENZYMES: Primary | ICD-10-CM

## 2019-08-20 NOTE — TELEPHONE ENCOUNTER
Attempt made to reach patient.  Msg from SINDHU Manuel left on her VM and mailed to her.  Lab draw scheduled 11/12/19; appt notice mailed.

## 2019-08-21 ENCOUNTER — TELEPHONE (OUTPATIENT)
Dept: HEPATOLOGY | Facility: CLINIC | Age: 24
End: 2019-08-21

## 2019-08-21 DIAGNOSIS — R74.8 ELEVATED LIVER ENZYMES: Primary | ICD-10-CM

## 2019-09-09 RX ORDER — LUBIPROSTONE 24 UG/1
24 CAPSULE, GELATIN COATED ORAL 2 TIMES DAILY
Qty: 60 CAPSULE | Refills: 3 | Status: SHIPPED | OUTPATIENT
Start: 2019-09-09 | End: 2019-12-03 | Stop reason: SDUPTHER

## 2019-09-16 ENCOUNTER — PATIENT MESSAGE (OUTPATIENT)
Dept: DERMATOLOGY | Facility: CLINIC | Age: 24
End: 2019-09-16

## 2019-09-18 ENCOUNTER — OFFICE VISIT (OUTPATIENT)
Dept: DERMATOLOGY | Facility: CLINIC | Age: 24
End: 2019-09-18
Payer: COMMERCIAL

## 2019-09-18 DIAGNOSIS — L73.9 FOLLICULITIS: Primary | ICD-10-CM

## 2019-09-18 PROCEDURE — 99999 PR PBB SHADOW E&M-EST. PATIENT-LVL II: CPT | Mod: PBBFAC,,, | Performed by: PHYSICIAN ASSISTANT

## 2019-09-18 PROCEDURE — 87070 CULTURE OTHR SPECIMN AEROBIC: CPT

## 2019-09-18 PROCEDURE — 99213 OFFICE O/P EST LOW 20 MIN: CPT | Mod: S$GLB,,, | Performed by: PHYSICIAN ASSISTANT

## 2019-09-18 PROCEDURE — 99999 PR PBB SHADOW E&M-EST. PATIENT-LVL II: ICD-10-PCS | Mod: PBBFAC,,, | Performed by: PHYSICIAN ASSISTANT

## 2019-09-18 PROCEDURE — 99213 PR OFFICE/OUTPT VISIT, EST, LEVL III, 20-29 MIN: ICD-10-PCS | Mod: S$GLB,,, | Performed by: PHYSICIAN ASSISTANT

## 2019-09-18 RX ORDER — MUPIROCIN 20 MG/G
OINTMENT TOPICAL 3 TIMES DAILY
Qty: 30 G | Refills: 1 | Status: SHIPPED | OUTPATIENT
Start: 2019-09-18 | End: 2022-08-09 | Stop reason: ALTCHOICE

## 2019-09-18 RX ORDER — DOXYCYCLINE 100 MG/1
CAPSULE ORAL
Qty: 20 CAPSULE | Refills: 0 | Status: SHIPPED | OUTPATIENT
Start: 2019-09-18 | End: 2019-10-18

## 2019-09-18 RX ORDER — BENZOYL PEROXIDE 100 MG/ML
LIQUID TOPICAL DAILY
Qty: 227 G | Refills: 11 | Status: SHIPPED | OUTPATIENT
Start: 2019-09-18 | End: 2020-01-29 | Stop reason: SDUPTHER

## 2019-09-18 RX ORDER — CLINDAMYCIN PHOSPHATE 10 MG/ML
SOLUTION TOPICAL 2 TIMES DAILY
Qty: 60 EACH | Refills: 5 | Status: SHIPPED | OUTPATIENT
Start: 2019-09-18 | End: 2020-09-17

## 2019-09-18 NOTE — PROGRESS NOTES
Subjective:       Patient ID:  Terra Hamilton is a 23 y.o. female who presents for   Chief Complaint   Patient presents with    Recurrent Skin Infections     under breast and groin x 3 days - improvement rx meds      Hx of IBS, acne of face and posterior neck with scarring and a hx of epilepsy on lamotrigine and ethosuximide (medically cleared by Dr. Sloan Elizabeth, neurologist in [place], [place] in writing to start accutane), last seen on 7/8/19 by me and previously by Dr. Raza on 5/2/19 and also seen by Dr. Bowers.  She is s/p two months of accutane, however discontinued on 4/23 due to elevation in ALT to 65 on 4/22 and 52 on 4/4.  She does have a hx of elevated CHL, TG and LDL, 261, 168 and 171 respectively. Referred to hepatology at last visit and awaiting consultation appointment.  Here today for new c/o.    History of Present Illness: The patient presents with chief complaint of boils.  Location: under right breast, and two of the groin  Duration: 3-4 days  Signs/Symptoms: red, tender, drainage (clear); denies fever or malaise    Prior treatments: clindagel qd      Current acne tx: doxy qd and tazorac qod (has not gotten new rx for tazorac from [facility]). + Improvement. Denies GI upset or skin irritation.      Sister with history of fatty liver.       Previous tx: spironolactone, oral ABX, tazorac 0.05% cream, [facility] acne cleanser, and OCP  FHX of cystic acne (sister, s/p accutane); denies FHX of IBD  PMHX: +PCOS on metformin and OCP (followed by Dr. Nava);  history or facial redness after using Cera Ve cleanser     ipledge ID# is [id number]          Review of Systems   Constitutional: Positive for chills. Negative for fever and malaise.   Gastrointestinal: Negative for nausea, vomiting and diarrhea.   Skin: Positive for daily sunscreen use. Negative for itching, rash, dry skin and sun sensitivity.   Hematologic/Lymphatic: Does not bruise/bleed easily.        Objective:    Physical Exam    Constitutional: She appears well-developed and well-nourished. No distress.   Neurological: She is alert and oriented to person, place, and time. She is not disoriented.   Psychiatric: She has a normal mood and affect.   Skin:   Areas Examined (abnormalities noted in diagram):   Head / Face Inspection Performed  Neck Inspection Performed  Chest / Axilla Inspection Performed  Abdomen Inspection Performed  Genitals / Buttocks / Groin Inspection Performed  Back Inspection Performed  RUE Inspected  LUE Inspection Performed              Diagram Legend     Erythematous scaling macule/papule c/w actinic keratosis       Vascular papule c/w angioma      Pigmented verrucoid papule/plaque c/w seborrheic keratosis      Yellow umbilicated papule c/w sebaceous hyperplasia      Irregularly shaped tan macule c/w lentigo     1-2 mm smooth white papules consistent with Milia      Movable subcutaneous cyst with punctum c/w epidermal inclusion cyst      Subcutaneous movable cyst c/w pilar cyst      Firm pink to brown papule c/w dermatofibroma      Pedunculated fleshy papule(s) c/w skin tag(s)      Evenly pigmented macule c/w junctional nevus     Mildly variegated pigmented, slightly irregular-bordered macule c/w mildly atypical nevus      Flesh colored to evenly pigmented papule c/w intradermal nevus       Pink pearly papule/plaque c/w basal cell carcinoma      Erythematous hyperkeratotic cursted plaque c/w SCC      Surgical scar with no sign of skin cancer recurrence      Open and closed comedones      Inflammatory papules and pustules      Verrucoid papule consistent consistent with wart     Erythematous eczematous patches and plaques     Dystrophic onycholytic nail with subungual debris c/w onychomycosis     Umbilicated papule    Erythematous-base heme-crusted tan verrucoid plaque consistent with inflamed seborrheic keratosis     Erythematous Silvery Scaling Plaque c/w Psoriasis     See annotation      Assessment / Plan:         Folliculitis  Scarring  Post inflammatory Hyperpigmentation  -     clindamycin (CLEOCIN T) 1 % Swab; Apply topically 2 (two) times daily. For folliculitis.  Dispense: 60 each; Refill: 5  -     benzoyl peroxide (BP WASH) 10 % external wash; Apply topically once daily. Rinse completely.  May bleach clothing  Dispense: 227 g; Refill: 11  -     mupirocin (BACTROBAN) 2 % ointment; Apply topically 3 (three) times daily. For broken skin.  Dispense: 30 g; Refill: 1  -     doxycycline (MONODOX) 100 MG capsule; Take twice daily with food x 10 days.  May cause upset stomach.  Dispense: 20 capsule; Refill: 0  Bacterial culture today, will start empiric therapy with doxy bid x 10 days and mupirocin.  Will also start clinda swabs bid + BPO wash qd for maintenance. May consider CLN cleanser qd if desired. Recheck in 1 month, sooner for worsening.         Follow up in about 4 weeks (around 10/16/2019).

## 2019-09-18 NOTE — PATIENT INSTRUCTIONS
BRI Cleasner once daily for groin and under breasts. [email or web address] [email or web address]  (green label for skin prone to folliculitis). May be used as an alternative to the Benzoyl Peroxide wash once daily.

## 2019-09-21 LAB — BACTERIA SPEC AEROBE CULT: NORMAL

## 2019-10-07 ENCOUNTER — PATIENT MESSAGE (OUTPATIENT)
Dept: FAMILY MEDICINE | Facility: CLINIC | Age: 24
End: 2019-10-07

## 2019-10-07 DIAGNOSIS — Z00.00 PREVENTATIVE HEALTH CARE: Primary | ICD-10-CM

## 2019-10-07 NOTE — TELEPHONE ENCOUNTER
See my chart message.  I entered orders for a lipid and A1c.  She has had extensive lab work done in recent months so those were the only 2 labs that I would need.

## 2019-10-14 ENCOUNTER — OFFICE VISIT (OUTPATIENT)
Dept: DERMATOLOGY | Facility: CLINIC | Age: 24
End: 2019-10-14
Payer: COMMERCIAL

## 2019-10-14 ENCOUNTER — LAB VISIT (OUTPATIENT)
Dept: LAB | Facility: HOSPITAL | Age: 24
End: 2019-10-14
Payer: COMMERCIAL

## 2019-10-14 DIAGNOSIS — R74.8 ELEVATED LIVER ENZYMES: ICD-10-CM

## 2019-10-14 DIAGNOSIS — L70.0 ACNE VULGARIS: ICD-10-CM

## 2019-10-14 DIAGNOSIS — Z00.00 PREVENTATIVE HEALTH CARE: ICD-10-CM

## 2019-10-14 DIAGNOSIS — L73.0 ACNE KELOIDALIS NUCHAE: ICD-10-CM

## 2019-10-14 DIAGNOSIS — L73.9 FOLLICULITIS: Primary | ICD-10-CM

## 2019-10-14 DIAGNOSIS — L25.9 CONTACT DERMATITIS, UNSPECIFIED CONTACT DERMATITIS TYPE, UNSPECIFIED TRIGGER: ICD-10-CM

## 2019-10-14 LAB
CHOLEST SERPL-MCNC: 260 MG/DL (ref 120–199)
CHOLEST/HDLC SERPL: 4.6 {RATIO} (ref 2–5)
ESTIMATED AVG GLUCOSE: 85 MG/DL (ref 68–131)
HBA1C MFR BLD HPLC: 4.6 % (ref 4–5.6)
HDLC SERPL-MCNC: 57 MG/DL (ref 40–75)
HDLC SERPL: 21.9 % (ref 20–50)
LDLC SERPL CALC-MCNC: 172.6 MG/DL (ref 63–159)
NONHDLC SERPL-MCNC: 203 MG/DL
TRIGL SERPL-MCNC: 152 MG/DL (ref 30–150)

## 2019-10-14 PROCEDURE — 83036 HEMOGLOBIN GLYCOSYLATED A1C: CPT

## 2019-10-14 PROCEDURE — 99999 PR PBB SHADOW E&M-EST. PATIENT-LVL II: CPT | Mod: PBBFAC,,, | Performed by: PHYSICIAN ASSISTANT

## 2019-10-14 PROCEDURE — 99213 PR OFFICE/OUTPT VISIT, EST, LEVL III, 20-29 MIN: ICD-10-PCS | Mod: S$GLB,,, | Performed by: PHYSICIAN ASSISTANT

## 2019-10-14 PROCEDURE — 36415 COLL VENOUS BLD VENIPUNCTURE: CPT

## 2019-10-14 PROCEDURE — 99999 PR PBB SHADOW E&M-EST. PATIENT-LVL II: ICD-10-PCS | Mod: PBBFAC,,, | Performed by: PHYSICIAN ASSISTANT

## 2019-10-14 PROCEDURE — 99213 OFFICE O/P EST LOW 20 MIN: CPT | Mod: S$GLB,,, | Performed by: PHYSICIAN ASSISTANT

## 2019-10-14 PROCEDURE — 80061 LIPID PANEL: CPT

## 2019-10-14 NOTE — PROGRESS NOTES
Subjective:       Patient ID:  Terra Hamilton is a 23 y.o. female who presents for   Chief Complaint   Patient presents with    Recurrent Skin Infections     f/u + improvement     Acne     to face      Hx of IBS, abscess of mons pubis (9/18/19), axillary eczematous dermatitis, acne of face and posterior neck with scarring and a hx of epilepsy on lamotrigine and ethosuximide (medically cleared by Dr. Sloan Elizabeth, neurologist in [place], [place] in writing to start accutane), last seen on 7/8/19 by me and previously by Dr. Raza on 5/2/19 and also seen by Dr. Bowers.  She is s/p two months of accutane, however discontinued on 4/23 due to elevation in ALT to 65 on 4/22 and 52 on 4/4.  She has a hx of elevated CHL, TG and LDL, 261, 168 and 171 respectively. Referred to hepatology at last visit and had consultation w/hepatology (diagnosed with early fatty liver and a gall stone).  Here today for f/u of acne and h/o abscess.    For abscess: No recent flares since last visit.  Using clinda wipes qd, BPO wash qd. Denies dryness or irritation.  Prior treatments: clindagel qd    Current acne tx:  tazorac qd to qod (denies irritation), clinda gel prn flares. + Improvement.   Previous tx: doxy    For underarm dermatitis, had 1 flare since last visit.     Previous tx: spironolactone, oral ABX, tazorac 0.05% cream, [facility] acne cleanser, doxy, and OCP  FHX of cystic acne (sister, s/p accutane); denies FHX of IBD; fatty liver in sister  PMHX: +PCOS on metformin and OCP (followed by Dr. Nava);  history or facial redness after using Cera Ve cleanser     ipledge ID# is [id number]          Review of Systems   Constitutional: Negative for fever and chills.   Gastrointestinal: Negative for nausea and vomiting.   Skin: Positive for activity-related sunscreen use. Negative for itching, rash, dry skin, daily sunscreen use and recent sunburn.   Hematologic/Lymphatic: Does not bruise/bleed easily.        Objective:     Physical Exam   Constitutional: She appears well-developed and well-nourished. No distress.   Neurological: She is alert and oriented to person, place, and time. She is not disoriented.   Psychiatric: She has a normal mood and affect.   Skin:   Areas Examined (abnormalities noted in diagram):   Head / Face Inspection Performed  Neck Inspection Performed  Chest / Axilla Inspection Performed  Abdomen Inspection Performed  Back Inspection Performed  RUE Inspected  LUE Inspection Performed                   Diagram Legend     Erythematous scaling macule/papule c/w actinic keratosis       Vascular papule c/w angioma      Pigmented verrucoid papule/plaque c/w seborrheic keratosis      Yellow umbilicated papule c/w sebaceous hyperplasia      Irregularly shaped tan macule c/w lentigo     1-2 mm smooth white papules consistent with Milia      Movable subcutaneous cyst with punctum c/w epidermal inclusion cyst      Subcutaneous movable cyst c/w pilar cyst      Firm pink to brown papule c/w dermatofibroma      Pedunculated fleshy papule(s) c/w skin tag(s)      Evenly pigmented macule c/w junctional nevus     Mildly variegated pigmented, slightly irregular-bordered macule c/w mildly atypical nevus      Flesh colored to evenly pigmented papule c/w intradermal nevus       Pink pearly papule/plaque c/w basal cell carcinoma      Erythematous hyperkeratotic cursted plaque c/w SCC      Surgical scar with no sign of skin cancer recurrence      Open and closed comedones      Inflammatory papules and pustules      Verrucoid papule consistent consistent with wart     Erythematous eczematous patches and plaques     Dystrophic onycholytic nail with subungual debris c/w onychomycosis     Umbilicated papule    Erythematous-base heme-crusted tan verrucoid plaque consistent with inflamed seborrheic keratosis     Erythematous Silvery Scaling Plaque c/w Psoriasis     See annotation      Assessment / Plan:        Folliculitis  Improved and  stable, will continue clinda wipes qd and BPO wash qd for maintenance.    Acne vulgaris  Stable, will continue tazorac gel qhs to qod as tolerated. Continue clinda gel prn flares.    Acne keloidalis nuchae  Stable, will continue clobetasol solution qd prn. May use clinda wipes qd to bid and may also use BPO wash qd as tolerated.    Elevated liver enzymes  Followed by hepatology, diagnosed with early fatty liver and gall stone.    Contact dermatitis, unspecified contact dermatitis type, unspecified trigger  No primary rash or lesions today, will monitor. Reserve TAC cream bid prn flares. Reviewed hypoallergenic regimen, continue hypoallergenic Darion's deodorant. May reconsider patch testing in future.         Follow up in about 6 months (around 4/14/2020), or if symptoms worsen or fail to improve.

## 2019-10-18 ENCOUNTER — OFFICE VISIT (OUTPATIENT)
Dept: FAMILY MEDICINE | Facility: CLINIC | Age: 24
End: 2019-10-18
Payer: COMMERCIAL

## 2019-10-18 VITALS
HEART RATE: 80 BPM | HEIGHT: 61 IN | BODY MASS INDEX: 48.78 KG/M2 | SYSTOLIC BLOOD PRESSURE: 102 MMHG | DIASTOLIC BLOOD PRESSURE: 76 MMHG | WEIGHT: 258.38 LBS

## 2019-10-18 DIAGNOSIS — E28.2 PCOS (POLYCYSTIC OVARIAN SYNDROME): Primary | ICD-10-CM

## 2019-10-18 DIAGNOSIS — K80.20 CALCULUS OF GALLBLADDER WITHOUT CHOLECYSTITIS WITHOUT OBSTRUCTION: ICD-10-CM

## 2019-10-18 DIAGNOSIS — E78.00 ELEVATED CHOLESTEROL: ICD-10-CM

## 2019-10-18 PROCEDURE — 3008F PR BODY MASS INDEX (BMI) DOCUMENTED: ICD-10-PCS | Mod: CPTII,S$GLB,, | Performed by: FAMILY MEDICINE

## 2019-10-18 PROCEDURE — 90686 IIV4 VACC NO PRSV 0.5 ML IM: CPT | Mod: S$GLB,,, | Performed by: FAMILY MEDICINE

## 2019-10-18 PROCEDURE — 90471 FLU VACCINE (QUAD) GREATER THAN OR EQUAL TO 3YO PRESERVATIVE FREE IM: ICD-10-PCS | Mod: S$GLB,,, | Performed by: FAMILY MEDICINE

## 2019-10-18 PROCEDURE — 99214 PR OFFICE/OUTPT VISIT, EST, LEVL IV, 30-39 MIN: ICD-10-PCS | Mod: 25,S$GLB,, | Performed by: FAMILY MEDICINE

## 2019-10-18 PROCEDURE — 90686 FLU VACCINE (QUAD) GREATER THAN OR EQUAL TO 3YO PRESERVATIVE FREE IM: ICD-10-PCS | Mod: S$GLB,,, | Performed by: FAMILY MEDICINE

## 2019-10-18 PROCEDURE — 99999 PR PBB SHADOW E&M-EST. PATIENT-LVL III: ICD-10-PCS | Mod: PBBFAC,,, | Performed by: FAMILY MEDICINE

## 2019-10-18 PROCEDURE — 99999 PR PBB SHADOW E&M-EST. PATIENT-LVL III: CPT | Mod: PBBFAC,,, | Performed by: FAMILY MEDICINE

## 2019-10-18 PROCEDURE — 3008F BODY MASS INDEX DOCD: CPT | Mod: CPTII,S$GLB,, | Performed by: FAMILY MEDICINE

## 2019-10-18 PROCEDURE — 99214 OFFICE O/P EST MOD 30 MIN: CPT | Mod: 25,S$GLB,, | Performed by: FAMILY MEDICINE

## 2019-10-18 PROCEDURE — 90471 IMMUNIZATION ADMIN: CPT | Mod: S$GLB,,, | Performed by: FAMILY MEDICINE

## 2019-10-18 RX ORDER — COLESEVELAM 180 1/1
1875 TABLET ORAL 2 TIMES DAILY WITH MEALS
Qty: 540 TABLET | Refills: 3 | Status: SHIPPED | OUTPATIENT
Start: 2019-10-18 | End: 2020-05-28

## 2019-10-18 NOTE — PROGRESS NOTES
THIS DOCUMENT WAS MADE IN PART WITH VOICE RECOGNITION SOFTWARE.  OCCASIONALLY THIS SOFTWARE WILL MISINTERPRET WORDS OR PHRASES.      Terra Hamilton  1995    Terra was seen today for follow-up.    Diagnoses and all orders for this visit:    PCOS (polycystic ovarian syndrome)  Stable    Elevated cholesterol  -     Lipid panel; Future  We need to be more proactive.  There is some room for dietary improvement but she is not doing too bad by her report.  Given age, childbearing age, etc prefer to start with something not systemic at least initially.  So will start Welchol.  Although she does have chronic constipation so I will have her start slowly.  If she does not tolerate this then we may have to look at other options.    Calculus of gallbladder without cholecystitis without obstruction  It appears asymptomatic now but we did discuss signs to monitor    Other orders  -     colesevelam (WELCHOL) 625 mg tablet; Take 3 tablets (1,875 mg total) by mouth 2 (two) times daily with meals.    Appointment duration greater than 25 min., more than 50% face-to-face counseling      Subjective     Chief Complaint   Patient presents with    Follow-up       HPI    Recently diagnosed with migraines  States tingling back of head, later vision changes, no HA  Did see Dr. Elizabeth     HPI elements addressed above in the assessment and plan including problems, diagnosis, stability/instability,  improving/worsening, and chronicity will not be duplicated in this section. Any important additional HPI topics will be discussed here if needed.    Active Ambulatory Problems     Diagnosis Date Noted    Viral gastroenteritis 09/11/2012    Chronic constipation with overflow 09/11/2012    Epilepsy 05/09/2013    Plantar wart 04/04/2017    Pes valgus 04/04/2017    Hallux abducto valgus 04/04/2017    Hallux valgus of right foot 06/01/2018     Resolved Ambulatory Problems     Diagnosis Date Noted    No Resolved Ambulatory Problems  "    Past Medical History:   Diagnosis Date    Focal seizures     History of polycystic ovarian disease     Seizures     Speech delay        Review of Systems  We discussed her neurological symptoms.  She did consult with Neurology.  We discussed the incidental finding of gallstones.  She has occasional pain on the right side or right flank area.  But it is not necessarily associated with eating.  We did discuss signs to look out for regarding gallstones.  Certainly should she develop fever and right upper quadrant pain she should notify us right away.  If she has reoccurring right upper quadrant pain after eating that would be a reason to consider cholecystectomy.    Objective     Physical Exam  Vitals:    10/18/19 1420   BP: 102/76   BP Location: Left arm   Patient Position: Sitting   BP Method: Large (Manual)   Pulse: 80   Weight: 117.2 kg (258 lb 6.1 oz)   Height: 5' 1" (1.549 m)       MOST RECENT LABS IN OUR ELECTRONIC MEDICAL RECORD:     Results for orders placed or performed in visit on 10/14/19   Lipid panel   Result Value Ref Range    Cholesterol 260 (H) 120 - 199 mg/dL    Triglycerides 152 (H) 30 - 150 mg/dL    HDL 57 40 - 75 mg/dL    LDL Cholesterol 172.6 (H) 63.0 - 159.0 mg/dL    Hdl/Cholesterol Ratio 21.9 20.0 - 50.0 %    Total Cholesterol/HDL Ratio 4.6 2.0 - 5.0    Non-HDL Cholesterol 203 mg/dL   Hemoglobin A1c   Result Value Ref Range    Hemoglobin A1C 4.6 4.0 - 5.6 %    Estimated Avg Glucose 85 68 - 131 mg/dL         "

## 2019-10-19 ENCOUNTER — PATIENT MESSAGE (OUTPATIENT)
Dept: FAMILY MEDICINE | Facility: CLINIC | Age: 24
End: 2019-10-19

## 2019-10-21 NOTE — TELEPHONE ENCOUNTER
Please see pt chart message and advise in regards to rx cost for colesevelam. Any suggestions on a less costly medication for pt

## 2019-10-21 NOTE — TELEPHONE ENCOUNTER
Please begin prior authorization request for a Welchol.   diagnosis hyperlipidemia   failed therapy diet   reason for this medication: patient is only 23 years of age and of childbearing age. I do not recommend beginning a statin medication or a fenofibrate unless absolutely avoidable because of potential risk should she become pregnant and other side effects at such a young age.    welchol Is not systemic and is a safe option at her age. I wrote for the tablets,if the powder is covered I would be willing to switch to this. However I'm not willing to try the previously mentioned products for the reasons mentioned. This option is a safer option long-term given age

## 2019-11-03 ENCOUNTER — PATIENT MESSAGE (OUTPATIENT)
Dept: FAMILY MEDICINE | Facility: CLINIC | Age: 24
End: 2019-11-03

## 2019-11-11 ENCOUNTER — LAB VISIT (OUTPATIENT)
Dept: LAB | Facility: HOSPITAL | Age: 24
End: 2019-11-11
Payer: COMMERCIAL

## 2019-11-11 DIAGNOSIS — R74.8 ELEVATED LIVER ENZYMES: ICD-10-CM

## 2019-11-11 LAB
ALBUMIN SERPL BCP-MCNC: 3.5 G/DL (ref 3.5–5.2)
ALP SERPL-CCNC: 62 U/L (ref 55–135)
ALT SERPL W/O P-5'-P-CCNC: 21 U/L (ref 10–44)
AST SERPL-CCNC: 18 U/L (ref 10–40)
BILIRUB DIRECT SERPL-MCNC: 0.2 MG/DL (ref 0.1–0.3)
BILIRUB SERPL-MCNC: 0.3 MG/DL (ref 0.1–1)
PROT SERPL-MCNC: 7.4 G/DL (ref 6–8.4)

## 2019-11-11 PROCEDURE — 36415 COLL VENOUS BLD VENIPUNCTURE: CPT

## 2019-11-11 PROCEDURE — 80076 HEPATIC FUNCTION PANEL: CPT

## 2019-12-04 RX ORDER — LUBIPROSTONE 24 UG/1
24 CAPSULE, GELATIN COATED ORAL 2 TIMES DAILY
Qty: 60 CAPSULE | Refills: 0 | Status: SHIPPED | OUTPATIENT
Start: 2019-12-04 | End: 2020-01-02

## 2019-12-13 ENCOUNTER — PATIENT MESSAGE (OUTPATIENT)
Dept: FAMILY MEDICINE | Facility: CLINIC | Age: 24
End: 2019-12-13

## 2020-01-02 RX ORDER — LUBIPROSTONE 24 UG/1
24 CAPSULE, GELATIN COATED ORAL 2 TIMES DAILY
Qty: 60 CAPSULE | Refills: 0 | Status: SHIPPED | OUTPATIENT
Start: 2020-01-02 | End: 2020-02-24 | Stop reason: SDUPTHER

## 2020-01-06 ENCOUNTER — OFFICE VISIT (OUTPATIENT)
Dept: OBSTETRICS AND GYNECOLOGY | Facility: CLINIC | Age: 25
End: 2020-01-06
Payer: COMMERCIAL

## 2020-01-06 VITALS
DIASTOLIC BLOOD PRESSURE: 74 MMHG | SYSTOLIC BLOOD PRESSURE: 124 MMHG | WEIGHT: 260.38 LBS | BODY MASS INDEX: 49.16 KG/M2 | HEIGHT: 61 IN

## 2020-01-06 DIAGNOSIS — L73.2 HYDRADENITIS: Primary | ICD-10-CM

## 2020-01-06 PROCEDURE — 3008F BODY MASS INDEX DOCD: CPT | Mod: CPTII,S$GLB,, | Performed by: OBSTETRICS & GYNECOLOGY

## 2020-01-06 PROCEDURE — 3008F PR BODY MASS INDEX (BMI) DOCUMENTED: ICD-10-PCS | Mod: CPTII,S$GLB,, | Performed by: OBSTETRICS & GYNECOLOGY

## 2020-01-06 PROCEDURE — 99213 OFFICE O/P EST LOW 20 MIN: CPT | Mod: S$GLB,,, | Performed by: OBSTETRICS & GYNECOLOGY

## 2020-01-06 PROCEDURE — 99999 PR PBB SHADOW E&M-EST. PATIENT-LVL III: CPT | Mod: PBBFAC,,, | Performed by: OBSTETRICS & GYNECOLOGY

## 2020-01-06 PROCEDURE — 99213 PR OFFICE/OUTPT VISIT, EST, LEVL III, 20-29 MIN: ICD-10-PCS | Mod: S$GLB,,, | Performed by: OBSTETRICS & GYNECOLOGY

## 2020-01-06 PROCEDURE — 99999 PR PBB SHADOW E&M-EST. PATIENT-LVL III: ICD-10-PCS | Mod: PBBFAC,,, | Performed by: OBSTETRICS & GYNECOLOGY

## 2020-01-06 RX ORDER — MINOCYCLINE HYDROCHLORIDE 90 MG/1
1 TABLET, FILM COATED, EXTENDED RELEASE ORAL DAILY
Qty: 90 EACH | Refills: 3 | Status: SHIPPED | OUTPATIENT
Start: 2020-01-06 | End: 2020-01-08

## 2020-01-07 ENCOUNTER — LAB VISIT (OUTPATIENT)
Dept: LAB | Facility: HOSPITAL | Age: 25
End: 2020-01-07
Attending: FAMILY MEDICINE
Payer: COMMERCIAL

## 2020-01-07 ENCOUNTER — TELEPHONE (OUTPATIENT)
Dept: OBSTETRICS AND GYNECOLOGY | Facility: CLINIC | Age: 25
End: 2020-01-07

## 2020-01-07 DIAGNOSIS — E78.00 ELEVATED CHOLESTEROL: ICD-10-CM

## 2020-01-07 LAB
CHOLEST SERPL-MCNC: 257 MG/DL (ref 120–199)
CHOLEST/HDLC SERPL: 4.8 {RATIO} (ref 2–5)
HDLC SERPL-MCNC: 53 MG/DL (ref 40–75)
HDLC SERPL: 20.6 % (ref 20–50)
LDLC SERPL CALC-MCNC: 161 MG/DL (ref 63–159)
NONHDLC SERPL-MCNC: 204 MG/DL
TRIGL SERPL-MCNC: 215 MG/DL (ref 30–150)

## 2020-01-07 PROCEDURE — 80061 LIPID PANEL: CPT

## 2020-01-07 PROCEDURE — 36415 COLL VENOUS BLD VENIPUNCTURE: CPT | Mod: PN

## 2020-01-07 NOTE — TELEPHONE ENCOUNTER
Fax from [facility] requesting alternative to Minocycline ER 90 mg Rec:    minocycline Hcl 50 mg, tetracycline 500 mg, Morgidox 100 mg, Doxy 100 mg  Please advise

## 2020-01-08 DIAGNOSIS — Z79.899 ENCOUNTER FOR LONG-TERM (CURRENT) USE OF HIGH-RISK MEDICATION: ICD-10-CM

## 2020-01-08 RX ORDER — NORGESTIMATE AND ETHINYL ESTRADIOL 7DAYSX3 LO
KIT ORAL
Qty: 84 TABLET | Refills: 4 | Status: SHIPPED | OUTPATIENT
Start: 2020-01-08 | End: 2020-02-21 | Stop reason: SDUPTHER

## 2020-01-08 RX ORDER — MINOCYCLINE HYDROCHLORIDE 50 MG/1
50 CAPSULE ORAL DAILY
Qty: 90 CAPSULE | Refills: 3 | Status: SHIPPED | OUTPATIENT
Start: 2020-01-08 | End: 2021-01-31

## 2020-01-28 ENCOUNTER — PATIENT MESSAGE (OUTPATIENT)
Dept: OBSTETRICS AND GYNECOLOGY | Facility: CLINIC | Age: 25
End: 2020-01-28

## 2020-01-28 ENCOUNTER — PATIENT MESSAGE (OUTPATIENT)
Dept: FAMILY MEDICINE | Facility: CLINIC | Age: 25
End: 2020-01-28

## 2020-01-29 ENCOUNTER — OFFICE VISIT (OUTPATIENT)
Dept: DERMATOLOGY | Facility: CLINIC | Age: 25
End: 2020-01-29
Payer: COMMERCIAL

## 2020-01-29 DIAGNOSIS — L73.0 ACNE KELOIDALIS NUCHAE: ICD-10-CM

## 2020-01-29 DIAGNOSIS — L73.9 FOLLICULITIS: ICD-10-CM

## 2020-01-29 DIAGNOSIS — L70.0 ACNE VULGARIS: ICD-10-CM

## 2020-01-29 PROCEDURE — 99213 OFFICE O/P EST LOW 20 MIN: CPT | Mod: S$GLB,,, | Performed by: PHYSICIAN ASSISTANT

## 2020-01-29 PROCEDURE — 99999 PR PBB SHADOW E&M-EST. PATIENT-LVL II: ICD-10-PCS | Mod: PBBFAC,,, | Performed by: PHYSICIAN ASSISTANT

## 2020-01-29 PROCEDURE — 99999 PR PBB SHADOW E&M-EST. PATIENT-LVL II: CPT | Mod: PBBFAC,,, | Performed by: PHYSICIAN ASSISTANT

## 2020-01-29 PROCEDURE — 99213 PR OFFICE/OUTPT VISIT, EST, LEVL III, 20-29 MIN: ICD-10-PCS | Mod: S$GLB,,, | Performed by: PHYSICIAN ASSISTANT

## 2020-01-29 RX ORDER — CLOBETASOL PROPIONATE 0.46 MG/ML
SOLUTION TOPICAL
Qty: 25 ML | Refills: 0 | Status: SHIPPED | OUTPATIENT
Start: 2020-01-29 | End: 2021-07-13

## 2020-01-29 RX ORDER — CLINDAMYCIN PHOSPHATE 10 MG/G
GEL TOPICAL
Qty: 60 G | Refills: 3 | Status: SHIPPED | OUTPATIENT
Start: 2020-01-29 | End: 2022-08-09 | Stop reason: ALTCHOICE

## 2020-01-29 RX ORDER — BENZOYL PEROXIDE 100 MG/ML
LIQUID TOPICAL DAILY
Qty: 227 G | Refills: 11 | Status: SHIPPED | OUTPATIENT
Start: 2020-01-29 | End: 2021-01-28

## 2020-01-29 NOTE — PROGRESS NOTES
Subjective:       Patient ID:  Terra Hamilton is a 24 y.o. female who presents for   Chief Complaint   Patient presents with    Hair/Scalp Problem     f/u      Hx of IBS, abscess of mons pubis (9/18/19), ? HS (being followed by GYN, now on MCN qd x 6 weeks), axillary eczematous dermatitis, acne of face and posterior neck with scarring (AKN), and a hx of epilepsy on lamotrigine and ethosuximide (medically cleared by Dr. Sloan Elizabeth, neurologist in [place], [place] in writing to start accutane), last seen on 7/8/19 by me and previously by Dr. Raza on 5/2/19 and also seen by Dr. Bowers.  She is s/p two months of accutane, however discontinued on 4/23 due to elevation in ALT to 65 on 4/22 and 52 on 4/4.  She has a hx of elevated CHL, TG and LDL, 261, 168 and 171 respectively. Referred to hepatology at last visit and had consultation w/hepatology (diagnosed with early fatty liver and a gall stone).  Here today for f/u of acne and h/o abscess.    For AKN tx: using clinda gel qd, [facility] acne wash  Previous tx: multiple ILK with Dr. Caldwell, Clobex shampoo, and Clobex solution, doxy      For abscess: No recent flares since last visit. Current tx: MCN (started 1/8/19 by GYN for h/o abscess), clinda wipes qd, BPO wash qd. Denies dryness or irritation.  Prior treatments: clindagel qd    Current acne tx:  tazorac qd to qod (denies irritation), clinda gel prn flares. + Improvement.   Previous tx: doxy    For underarm dermatitis, no recent flare.     Previous Acne tx: spironolactone, oral ABX, tazorac 0.05% cream, CVS acne cleanser, doxy, and OCP, accutane (d/c 2/2 elevated liver enzymes)  FHX of cystic acne (sister, s/p accutane); denies FHX of IBD; fatty liver in sister  PMHX: +PCOS on metformin and OCP (followed by Dr. Nava);  history or facial redness after using Cera Ve cleanser     ipledge ID# is [id number]          Review of Systems   Constitutional: Negative for fever and chills.   Gastrointestinal:  Negative for nausea and vomiting.   Skin: Positive for activity-related sunscreen use. Negative for itching, rash, dry skin, daily sunscreen use and recent sunburn.   Hematologic/Lymphatic: Does not bruise/bleed easily.        Objective:    Physical Exam   Constitutional: She appears well-developed and well-nourished. No distress.   Neurological: She is alert and oriented to person, place, and time. She is not disoriented.   Psychiatric: She has a normal mood and affect.   Skin:   Areas Examined (abnormalities noted in diagram):   Scalp / Hair Palpated and Inspected  Head / Face Inspection Performed  Neck Inspection Performed  Chest / Axilla Inspection Performed  Abdomen Inspection Performed  Back Inspection Performed  RUE Inspected  LUE Inspection Performed                   Diagram Legend     Erythematous scaling macule/papule c/w actinic keratosis       Vascular papule c/w angioma      Pigmented verrucoid papule/plaque c/w seborrheic keratosis      Yellow umbilicated papule c/w sebaceous hyperplasia      Irregularly shaped tan macule c/w lentigo     1-2 mm smooth white papules consistent with Milia      Movable subcutaneous cyst with punctum c/w epidermal inclusion cyst      Subcutaneous movable cyst c/w pilar cyst      Firm pink to brown papule c/w dermatofibroma      Pedunculated fleshy papule(s) c/w skin tag(s)      Evenly pigmented macule c/w junctional nevus     Mildly variegated pigmented, slightly irregular-bordered macule c/w mildly atypical nevus      Flesh colored to evenly pigmented papule c/w intradermal nevus       Pink pearly papule/plaque c/w basal cell carcinoma      Erythematous hyperkeratotic cursted plaque c/w SCC      Surgical scar with no sign of skin cancer recurrence      Open and closed comedones      Inflammatory papules and pustules      Verrucoid papule consistent consistent with wart     Erythematous eczematous patches and plaques     Dystrophic onycholytic nail with subungual debris  c/w onychomycosis     Umbilicated papule    Erythematous-base heme-crusted tan verrucoid plaque consistent with inflamed seborrheic keratosis     Erythematous Silvery Scaling Plaque c/w Psoriasis     See annotation      Assessment / Plan:        Acne vulgaris  -     clindamycin phosphate 1% (CLINDAGEL) 1 % gel; AAA of face qd for acne.  Dispense: 60 g; Refill: 3  -     clobetasol (TEMOVATE) 0.05 % external solution; AAA of scalp bid prn.  Strong steroid- do not apply to face or folds of skin.  Dispense: 25 mL; Refill: 0  Stable, doing well with current regimen. Will continue tazorac qd to qod, clinda gel prn.    Acne keloidalis nuchae  -     clindamycin phosphate 1% (CLINDAGEL) 1 % gel; AAA of face qd for acne.  Dispense: 60 g; Refill: 3  -     clobetasol (TEMOVATE) 0.05 % external solution; AAA of scalp bid prn.  Strong steroid- do not apply to face or folds of skin.  Dispense: 25 mL; Refill: 0  -     benzoyl peroxide (BP WASH) 10 % external wash; Apply topically once daily. Rinse completely.  May bleach clothing  Dispense: 227 g; Refill: 11  Will refill clinda gel today, encouraged to use BID. Start BPO wash qd as tolerated, start Clobetasol solution bid prn. Would reconsider ILK in future. She is on MCN qd as per GYN recs and she will continue as instructed.           Follow up in about 8 weeks (around 3/25/2020) for acne.

## 2020-02-03 RX ORDER — LUBIPROSTONE 24 UG/1
24 CAPSULE, GELATIN COATED ORAL 2 TIMES DAILY
Qty: 60 CAPSULE | Refills: 3 | OUTPATIENT
Start: 2020-02-03

## 2020-02-16 ENCOUNTER — PATIENT MESSAGE (OUTPATIENT)
Dept: FAMILY MEDICINE | Facility: CLINIC | Age: 25
End: 2020-02-16

## 2020-02-16 DIAGNOSIS — E78.00 ELEVATED CHOLESTEROL: Primary | ICD-10-CM

## 2020-02-17 RX ORDER — EZETIMIBE 10 MG/1
10 TABLET ORAL DAILY
Qty: 90 TABLET | Refills: 3 | Status: SHIPPED | OUTPATIENT
Start: 2020-02-17 | End: 2021-02-25

## 2020-02-17 NOTE — TELEPHONE ENCOUNTER
See my chart message.  I sent in the prescription for Zetia, please schedule labs in 2-3 months or whenever she has a break from school

## 2020-02-19 ENCOUNTER — PATIENT MESSAGE (OUTPATIENT)
Dept: OBSTETRICS AND GYNECOLOGY | Facility: CLINIC | Age: 25
End: 2020-02-19

## 2020-02-20 ENCOUNTER — PATIENT OUTREACH (OUTPATIENT)
Dept: ADMINISTRATIVE | Facility: OTHER | Age: 25
End: 2020-02-20

## 2020-02-21 ENCOUNTER — OFFICE VISIT (OUTPATIENT)
Dept: OBSTETRICS AND GYNECOLOGY | Facility: CLINIC | Age: 25
End: 2020-02-21
Payer: COMMERCIAL

## 2020-02-21 VITALS
DIASTOLIC BLOOD PRESSURE: 70 MMHG | SYSTOLIC BLOOD PRESSURE: 120 MMHG | RESPIRATION RATE: 20 BRPM | WEIGHT: 264.56 LBS | BODY MASS INDEX: 49.99 KG/M2

## 2020-02-21 DIAGNOSIS — Z79.899 ENCOUNTER FOR LONG-TERM (CURRENT) USE OF HIGH-RISK MEDICATION: ICD-10-CM

## 2020-02-21 PROCEDURE — 99999 PR PBB SHADOW E&M-EST. PATIENT-LVL IV: ICD-10-PCS | Mod: PBBFAC,,, | Performed by: OBSTETRICS & GYNECOLOGY

## 2020-02-21 PROCEDURE — 3008F BODY MASS INDEX DOCD: CPT | Mod: CPTII,S$GLB,, | Performed by: OBSTETRICS & GYNECOLOGY

## 2020-02-21 PROCEDURE — 99999 PR PBB SHADOW E&M-EST. PATIENT-LVL IV: CPT | Mod: PBBFAC,,, | Performed by: OBSTETRICS & GYNECOLOGY

## 2020-02-21 PROCEDURE — 99213 PR OFFICE/OUTPT VISIT, EST, LEVL III, 20-29 MIN: ICD-10-PCS | Mod: S$GLB,,, | Performed by: OBSTETRICS & GYNECOLOGY

## 2020-02-21 PROCEDURE — 99213 OFFICE O/P EST LOW 20 MIN: CPT | Mod: S$GLB,,, | Performed by: OBSTETRICS & GYNECOLOGY

## 2020-02-21 PROCEDURE — 3008F PR BODY MASS INDEX (BMI) DOCUMENTED: ICD-10-PCS | Mod: CPTII,S$GLB,, | Performed by: OBSTETRICS & GYNECOLOGY

## 2020-02-21 RX ORDER — NORGESTIMATE AND ETHINYL ESTRADIOL 7DAYSX3 LO
1 KIT ORAL DAILY
Qty: 84 TABLET | Refills: 0 | Status: SHIPPED | OUTPATIENT
Start: 2020-02-21 | End: 2020-07-20 | Stop reason: SDUPTHER

## 2020-02-21 NOTE — PROGRESS NOTES
Here to discuss side effects,    irregular bleeding on continuous oral contraceptive pills - counseled.     Acne improved but not perfect, reassured - on monocycline    Pain with tampons, counseled. Recommended trial of extra small.     Refill oral contraceptive pills for now    Annual and PAP in March.

## 2020-02-24 RX ORDER — LUBIPROSTONE 24 UG/1
24 CAPSULE ORAL 2 TIMES DAILY
Qty: 60 CAPSULE | Refills: 0 | Status: SHIPPED | OUTPATIENT
Start: 2020-02-24 | End: 2020-03-30

## 2020-02-27 RX ORDER — LUBIPROSTONE 24 UG/1
CAPSULE, GELATIN COATED ORAL
Qty: 60 CAPSULE | Refills: 0 | OUTPATIENT
Start: 2020-02-27

## 2020-03-13 DIAGNOSIS — E28.2 PCOS (POLYCYSTIC OVARIAN SYNDROME): ICD-10-CM

## 2020-03-13 RX ORDER — METFORMIN HYDROCHLORIDE 500 MG/1
1000 TABLET, EXTENDED RELEASE ORAL
Qty: 180 TABLET | Refills: 0 | Status: SHIPPED | OUTPATIENT
Start: 2020-03-13 | End: 2020-06-08

## 2020-03-19 ENCOUNTER — PATIENT MESSAGE (OUTPATIENT)
Dept: OBSTETRICS AND GYNECOLOGY | Facility: CLINIC | Age: 25
End: 2020-03-19

## 2020-03-19 ENCOUNTER — PATIENT MESSAGE (OUTPATIENT)
Dept: FAMILY MEDICINE | Facility: CLINIC | Age: 25
End: 2020-03-19

## 2020-03-30 RX ORDER — LUBIPROSTONE 24 UG/1
CAPSULE, GELATIN COATED ORAL
Qty: 60 CAPSULE | Refills: 1 | Status: SHIPPED | OUTPATIENT
Start: 2020-03-30 | End: 2020-04-27

## 2020-04-21 ENCOUNTER — PATIENT MESSAGE (OUTPATIENT)
Dept: FAMILY MEDICINE | Facility: CLINIC | Age: 25
End: 2020-04-21

## 2020-04-27 RX ORDER — LUBIPROSTONE 24 UG/1
CAPSULE, GELATIN COATED ORAL
Qty: 60 CAPSULE | Refills: 0 | Status: SHIPPED | OUTPATIENT
Start: 2020-04-27 | End: 2020-05-28

## 2020-05-12 ENCOUNTER — TELEPHONE (OUTPATIENT)
Dept: OBSTETRICS AND GYNECOLOGY | Facility: CLINIC | Age: 25
End: 2020-05-12

## 2020-05-12 NOTE — TELEPHONE ENCOUNTER
GI referral entered per Dr. Villasenor  Request for long term use of Amitiza    Fax from [facility] requesting alternative for Amitiza not covered by insurance -requesting    Movantik instead

## 2020-05-25 RX ORDER — LUBIPROSTONE 24 UG/1
CAPSULE, GELATIN COATED ORAL
Qty: 60 CAPSULE | Refills: 0 | OUTPATIENT
Start: 2020-05-25

## 2020-05-26 ENCOUNTER — PATIENT MESSAGE (OUTPATIENT)
Dept: OBSTETRICS AND GYNECOLOGY | Facility: CLINIC | Age: 25
End: 2020-05-26

## 2020-05-28 ENCOUNTER — OFFICE VISIT (OUTPATIENT)
Dept: FAMILY MEDICINE | Facility: CLINIC | Age: 25
End: 2020-05-28
Payer: COMMERCIAL

## 2020-05-28 VITALS
BODY MASS INDEX: 51.65 KG/M2 | SYSTOLIC BLOOD PRESSURE: 116 MMHG | HEIGHT: 61 IN | WEIGHT: 273.56 LBS | DIASTOLIC BLOOD PRESSURE: 68 MMHG | RESPIRATION RATE: 14 BRPM | TEMPERATURE: 98 F | HEART RATE: 92 BPM

## 2020-05-28 DIAGNOSIS — E28.2 PCOS (POLYCYSTIC OVARIAN SYNDROME): ICD-10-CM

## 2020-05-28 DIAGNOSIS — E78.00 ELEVATED CHOLESTEROL: Primary | ICD-10-CM

## 2020-05-28 DIAGNOSIS — K59.04 CHRONIC IDIOPATHIC CONSTIPATION: ICD-10-CM

## 2020-05-28 PROCEDURE — 99214 PR OFFICE/OUTPT VISIT, EST, LEVL IV, 30-39 MIN: ICD-10-PCS | Mod: S$GLB,,, | Performed by: FAMILY MEDICINE

## 2020-05-28 PROCEDURE — 3008F BODY MASS INDEX DOCD: CPT | Mod: CPTII,S$GLB,, | Performed by: FAMILY MEDICINE

## 2020-05-28 PROCEDURE — 99999 PR PBB SHADOW E&M-EST. PATIENT-LVL III: CPT | Mod: PBBFAC,,, | Performed by: FAMILY MEDICINE

## 2020-05-28 PROCEDURE — 99214 OFFICE O/P EST MOD 30 MIN: CPT | Mod: S$GLB,,, | Performed by: FAMILY MEDICINE

## 2020-05-28 PROCEDURE — 99999 PR PBB SHADOW E&M-EST. PATIENT-LVL III: ICD-10-PCS | Mod: PBBFAC,,, | Performed by: FAMILY MEDICINE

## 2020-05-28 PROCEDURE — 3008F PR BODY MASS INDEX (BMI) DOCUMENTED: ICD-10-PCS | Mod: CPTII,S$GLB,, | Performed by: FAMILY MEDICINE

## 2020-05-28 RX ORDER — LUBIPROSTONE 24 UG/1
24 CAPSULE ORAL 2 TIMES DAILY
Qty: 60 CAPSULE | Refills: 5 | Status: SHIPPED | OUTPATIENT
Start: 2020-05-28 | End: 2020-06-22

## 2020-05-28 NOTE — PROGRESS NOTES
THIS DOCUMENT WAS MADE IN PART WITH VOICE RECOGNITION SOFTWARE.  OCCASIONALLY THIS SOFTWARE WILL MISINTERPRET WORDS OR PHRASES.      Terra Valdezabbyshoshana  1995    Terra was seen today for follow-up.    Diagnoses and all orders for this visit:    Elevated cholesterol  Schedule labs, continue Zetia she is tolerating this well.  She previously did not tolerate the Welchol    PCOS (polycystic ovarian syndrome)  Stable    Chronic idiopathic constipation  -     lubiprostone (AMITIZA) 24 MCG Cap; Take 1 capsule (24 mcg total) by mouth 2 (two) times daily.  Will see if the appropriate diagnosis code helps insurance approval.  If not hopefully they will provide us with alternatives but I would certainly prefer not to switch this medication if it has worked well for her for some time now.  But unfortunately we are all at the mercy of the insurance companies.      Subjective     Chief Complaint   Patient presents with    Follow-up     Discuss IBS medication and refill       HPI    Has been on amitiza 24 mcg bid for IBS C, denied by new insurance  Sx more consistent with CIC, will resubmit   She has had constipation for many years.  Mostly bloating some straining some hard stools.  Sometimes some abdominal cramps.  She denies intermittent diarrhea.    Tolerating Zetia, due for lab  Current diet has not been as ideal with pandemic restrictions but overall she has been trying to do well.  Previously we tried Welchol but she did not tolerate that medication.      Active Ambulatory Problems     Diagnosis Date Noted    Viral gastroenteritis 09/11/2012    Chronic constipation with overflow 09/11/2012    Epilepsy 05/09/2013    Plantar wart 04/04/2017    Pes valgus 04/04/2017    Hallux abducto valgus 04/04/2017    Hallux valgus of right foot 06/01/2018     Resolved Ambulatory Problems     Diagnosis Date Noted    No Resolved Ambulatory Problems     Past Medical History:   Diagnosis Date    Focal seizures     History  "of polycystic ovarian disease     Seizures     Speech delay          Review of Systems   HENT: Negative.    Cardiovascular: Negative.    Gastrointestinal: Positive for constipation. Negative for abdominal pain, blood in stool and diarrhea.   Genitourinary: Negative.    Musculoskeletal: Negative.        Objective     Physical Exam   Constitutional: She is oriented to person, place, and time. She appears well-developed and well-nourished. No distress.   HENT:   Head: Normocephalic and atraumatic.   Eyes: No scleral icterus.   Pulmonary/Chest: Effort normal. No respiratory distress.   Neurological: She is alert and oriented to person, place, and time.   Skin: She is not diaphoretic.   Psychiatric: She has a normal mood and affect. Her behavior is normal.   Vitals reviewed.    Vitals:    05/28/20 1541   BP: 116/68   BP Location: Right arm   Patient Position: Sitting   BP Method: Large (Manual)   Pulse: 92   Resp: 14   Temp: 98.2 °F (36.8 °C)   TempSrc: Oral   Weight: 124.1 kg (273 lb 9.5 oz)   Height: 5' 1" (1.549 m)       MOST RECENT LABS IN OUR ELECTRONIC MEDICAL RECORD:     Results for orders placed or performed in visit on 01/07/20   Lipid panel   Result Value Ref Range    Cholesterol 257 (H) 120 - 199 mg/dL    Triglycerides 215 (H) 30 - 150 mg/dL    HDL 53 40 - 75 mg/dL    LDL Cholesterol 161.0 (H) 63.0 - 159.0 mg/dL    Hdl/Cholesterol Ratio 20.6 20.0 - 50.0 %    Total Cholesterol/HDL Ratio 4.8 2.0 - 5.0    Non-HDL Cholesterol 204 mg/dL         "

## 2020-06-01 ENCOUNTER — LAB VISIT (OUTPATIENT)
Dept: LAB | Facility: HOSPITAL | Age: 25
End: 2020-06-01
Attending: FAMILY MEDICINE
Payer: COMMERCIAL

## 2020-06-01 DIAGNOSIS — E78.00 ELEVATED CHOLESTEROL: ICD-10-CM

## 2020-06-01 LAB
ALBUMIN SERPL BCP-MCNC: 3.4 G/DL (ref 3.5–5.2)
ALP SERPL-CCNC: 64 U/L (ref 55–135)
ALT SERPL W/O P-5'-P-CCNC: 14 U/L (ref 10–44)
ANION GAP SERPL CALC-SCNC: 10 MMOL/L (ref 8–16)
AST SERPL-CCNC: 16 U/L (ref 10–40)
BILIRUB SERPL-MCNC: 0.3 MG/DL (ref 0.1–1)
BUN SERPL-MCNC: 10 MG/DL (ref 6–20)
CALCIUM SERPL-MCNC: 9.4 MG/DL (ref 8.7–10.5)
CHLORIDE SERPL-SCNC: 104 MMOL/L (ref 95–110)
CHOLEST SERPL-MCNC: 211 MG/DL (ref 120–199)
CHOLEST/HDLC SERPL: 3.7 {RATIO} (ref 2–5)
CO2 SERPL-SCNC: 22 MMOL/L (ref 23–29)
CREAT SERPL-MCNC: 0.8 MG/DL (ref 0.5–1.4)
EST. GFR  (AFRICAN AMERICAN): >60 ML/MIN/1.73 M^2
EST. GFR  (NON AFRICAN AMERICAN): >60 ML/MIN/1.73 M^2
GLUCOSE SERPL-MCNC: 80 MG/DL (ref 70–110)
HDLC SERPL-MCNC: 57 MG/DL (ref 40–75)
HDLC SERPL: 27 % (ref 20–50)
LDLC SERPL CALC-MCNC: 122 MG/DL (ref 63–159)
NONHDLC SERPL-MCNC: 154 MG/DL
POTASSIUM SERPL-SCNC: 3.9 MMOL/L (ref 3.5–5.1)
PROT SERPL-MCNC: 7 G/DL (ref 6–8.4)
SODIUM SERPL-SCNC: 136 MMOL/L (ref 136–145)
TRIGL SERPL-MCNC: 160 MG/DL (ref 30–150)

## 2020-06-01 PROCEDURE — 80053 COMPREHEN METABOLIC PANEL: CPT

## 2020-06-01 PROCEDURE — 80061 LIPID PANEL: CPT

## 2020-06-01 PROCEDURE — 36415 COLL VENOUS BLD VENIPUNCTURE: CPT | Mod: PN

## 2020-06-11 ENCOUNTER — TELEPHONE (OUTPATIENT)
Dept: FAMILY MEDICINE | Facility: CLINIC | Age: 25
End: 2020-06-11

## 2020-06-11 NOTE — TELEPHONE ENCOUNTER
[facility] sent a medication alternative request via fax for lubiprostone (AMITIZA) 24 MCG Cap. Step therapy is required through insurance. Patients insurance will only cover Linzess and Movantik. Please review and advise.     [facility] #[id number] - [place] - [address] [place] [address]  [phone]  Fax: [phone]

## 2020-06-22 ENCOUNTER — PATIENT MESSAGE (OUTPATIENT)
Dept: FAMILY MEDICINE | Facility: CLINIC | Age: 25
End: 2020-06-22

## 2020-06-22 DIAGNOSIS — K59.04 CHRONIC IDIOPATHIC CONSTIPATION: Primary | ICD-10-CM

## 2020-06-22 DIAGNOSIS — Z02.9 ENCOUNTER FOR ADMINISTRATIVE EXAMINATIONS: ICD-10-CM

## 2020-06-22 NOTE — TELEPHONE ENCOUNTER
Just because we do not have all the vaccinations listed in our record does not mean she did not receive them.  We certainly can do boosters if desired however give her the option of checking antibody levels 1st specifically for MMR and hepatitis B and Varicella    The HPV vaccination is recommended but not necessarily required for school.  Please let her know this and I do recommended but she should know is not required for school before proceeding.    Let me know    Also I sent in Linzess, an alternative for the Amitiza.  This was from a previous message.  Have her check with the pharmacy and her insurance, if they refuse this new medicine to please let me know

## 2020-06-24 ENCOUNTER — LAB VISIT (OUTPATIENT)
Dept: LAB | Facility: HOSPITAL | Age: 25
End: 2020-06-24
Attending: FAMILY MEDICINE
Payer: COMMERCIAL

## 2020-06-24 DIAGNOSIS — Z02.9 ENCOUNTER FOR ADMINISTRATIVE EXAMINATIONS: ICD-10-CM

## 2020-06-24 PROCEDURE — 86735 MUMPS ANTIBODY: CPT

## 2020-06-24 PROCEDURE — 86765 RUBEOLA ANTIBODY: CPT

## 2020-06-24 PROCEDURE — 86787 VARICELLA-ZOSTER ANTIBODY: CPT

## 2020-06-24 PROCEDURE — 86706 HEP B SURFACE ANTIBODY: CPT

## 2020-06-24 PROCEDURE — 86762 RUBELLA ANTIBODY: CPT

## 2020-06-24 PROCEDURE — 36415 COLL VENOUS BLD VENIPUNCTURE: CPT | Mod: PN

## 2020-06-25 LAB
MUMPS IGG INTERPRETATION: POSITIVE
MUMPS IGG SCREEN: 2.03 ISR (ref 0–0.9)
RUBEOLA IGG ANTIBODY: 2.38 ISR (ref 0–0.9)
RUBEOLA INTERPRETATION: POSITIVE
RUBV IGG SER-ACNC: 20.1 IU/ML
RUBV IGG SER-IMP: REACTIVE
VARICELLA INTERPRETATION: POSITIVE
VARICELLA ZOSTER IGG: 1.58 ISR (ref 0–0.9)

## 2020-06-29 LAB
HBV SURFACE AB SER QL IA: POSITIVE
HBV SURFACE AB SERPL IA-ACNC: 17 MIU/ML

## 2020-07-06 ENCOUNTER — TELEPHONE (OUTPATIENT)
Dept: BARIATRICS | Facility: CLINIC | Age: 25
End: 2020-07-06

## 2020-07-06 NOTE — TELEPHONE ENCOUNTER
Pt message in pt schedule basket that she will do self pay price for visit and surgery.  Number to Ludwin Pena, , given to patient.

## 2020-07-15 ENCOUNTER — PATIENT OUTREACH (OUTPATIENT)
Dept: ADMINISTRATIVE | Facility: OTHER | Age: 25
End: 2020-07-15

## 2020-07-15 NOTE — PROGRESS NOTES
Requested updates within Care Everywhere.  Patient's chart was reviewed for overdue KRISTY topics.  .

## 2020-07-20 ENCOUNTER — OFFICE VISIT (OUTPATIENT)
Dept: OBSTETRICS AND GYNECOLOGY | Facility: CLINIC | Age: 25
End: 2020-07-20
Payer: COMMERCIAL

## 2020-07-20 VITALS — DIASTOLIC BLOOD PRESSURE: 70 MMHG | SYSTOLIC BLOOD PRESSURE: 118 MMHG

## 2020-07-20 DIAGNOSIS — Z79.899 ENCOUNTER FOR LONG-TERM (CURRENT) USE OF HIGH-RISK MEDICATION: ICD-10-CM

## 2020-07-20 DIAGNOSIS — Z12.4 PAP SMEAR FOR CERVICAL CANCER SCREENING: Primary | ICD-10-CM

## 2020-07-20 DIAGNOSIS — L73.2 HYDRADENITIS: ICD-10-CM

## 2020-07-20 PROCEDURE — 99395 PR PREVENTIVE VISIT,EST,18-39: ICD-10-PCS | Mod: S$GLB,,, | Performed by: OBSTETRICS & GYNECOLOGY

## 2020-07-20 PROCEDURE — 88142 CYTOPATH C/V THIN LAYER: CPT

## 2020-07-20 PROCEDURE — 99999 PR PBB SHADOW E&M-EST. PATIENT-LVL III: CPT | Mod: PBBFAC,,, | Performed by: OBSTETRICS & GYNECOLOGY

## 2020-07-20 PROCEDURE — 99395 PREV VISIT EST AGE 18-39: CPT | Mod: S$GLB,,, | Performed by: OBSTETRICS & GYNECOLOGY

## 2020-07-20 PROCEDURE — 87624 HPV HI-RISK TYP POOLED RSLT: CPT

## 2020-07-20 PROCEDURE — 99999 PR PBB SHADOW E&M-EST. PATIENT-LVL III: ICD-10-PCS | Mod: PBBFAC,,, | Performed by: OBSTETRICS & GYNECOLOGY

## 2020-07-20 RX ORDER — LAMOTRIGINE 200 MG/1
TABLET, ORALLY DISINTEGRATING ORAL
COMMUNITY
Start: 2020-05-06 | End: 2020-11-30

## 2020-07-20 RX ORDER — ERYTHROMYCIN BASE 250 MG
250 CAPSULE,DELAYED RELEASE (ENTERIC COATED) ORAL DAILY
Qty: 30 CAPSULE | Refills: 6 | Status: SHIPPED | OUTPATIENT
Start: 2020-07-20 | End: 2021-06-01

## 2020-07-20 RX ORDER — NORGESTIMATE AND ETHINYL ESTRADIOL 7DAYSX3 LO
1 KIT ORAL DAILY
Qty: 84 TABLET | Refills: 3 | Status: SHIPPED | OUTPATIENT
Start: 2020-07-20 | End: 2021-06-14

## 2020-07-20 NOTE — PROGRESS NOTES
"Chief Complaint   Patient presents with    Well Woman       History and Physical:  Patient's last menstrual period was 06/30/2020 (exact date).       Terra Hamilton is a 24 y.o.  female who presents today for her routine annual GYN exam. The patient has no Gynecology complaints today. Reports recurrent "boils" below breasts and in labia area the are small and resolve over time. Mild improvement on abx - will change, otherwise doing well on oral contraceptive pills       Allergies: Review of patient's allergies indicates:  No Known Allergies    Past Medical History:   Diagnosis Date    Focal seizures     History of polycystic ovarian disease     Seizures     2013    Speech delay        Past Surgical History:   Procedure Laterality Date    BUNIONECTOMY Right 6/1/2018    Procedure: BUNIONECTOMY;  Surgeon: Lm Obrien DPM;  Location: [facility];  Service: Podiatry;  Laterality: Right;    CORRECTION OF HAMMER TOE Right 6/1/2018    Procedure: CORRECTION, HAMMER TOE;  Surgeon: Lm Obrien DPM;  Location: [facility] OR;  Service: Podiatry;  Laterality: Right;    FOOT ARTHROPLASTY Right 6/1/2018    Procedure: orif subtalar joint with Hyprocure implant ;  Surgeon: Lm Obrien DPM;  Location: [facility] OR;  Service: Podiatry;  Laterality: Right;    FOOT SURGERY      TONSILLECTOMY, ADENOIDECTOMY, BILATERAL MYRINGOTOMY AND TUBES      WISDOM TOOTH EXTRACTION         MEDS:   Current Outpatient Medications on File Prior to Visit   Medication Sig Dispense Refill    benzoyl peroxide (BP WASH) 10 % external wash Apply topically once daily. Rinse completely.  May bleach clothing 227 g 11    clobetasol (TEMOVATE) 0.05 % external solution AAA of scalp bid prn.  Strong steroid- do not apply to face or folds of skin. 25 mL 0    ezetimibe (ZETIA ORAL) Zetia      fish oil-omega-3 fatty acids 300-1,000 mg capsule Take 1 capsule by mouth once daily.      ibuprofen (ADVIL,MOTRIN) 800 MG tablet Take one " po bid to tid with food (Patient taking differently: every 6 (six) hours as needed. Take one po bid to tid with food) 12 tablet 0    lamotrigine (LAMICTAL) 200 MG tablet One twice daily (Patient taking differently: Take 200 mg by mouth 2 (two) times daily. One twice daily) 60 tablet 0    linaCLOtide (LINZESS) 145 mcg Cap capsule Take 1 capsule (145 mcg total) by mouth once daily. 30 capsule 1    metFORMIN (GLUCOPHAGE-XR) 500 MG XR 24hr tablet TAKE 2 TABLETS (1,000 MG TOTAL) BY MOUTH DAILY WITH DINNER OR EVENING MEAL. 180 tablet 1    minocycline (MINOCIN,DYNACIN) 50 MG Cap Take 1 capsule (50 mg total) by mouth once daily. 90 capsule 3    multivit-min/ferrous fumarate (MULTI VITAMIN ORAL) Take 1 Dose by mouth once daily.      norgestimate-ethinyl estradiol (TRI-LO-SPRINTEC) 0.18/0.215/0.25 mg-25 mcg tablet Take 1 tablet by mouth once daily. 84 tablet 0    tazarotene (TAZORAC) 0.05 % Crea cream Thin film to face qhs, take night off if irritation develops 60 g 3    triamcinolone acetonide 0.025% (KENALOG) 0.025 % cream Apply topically 2 (two) times daily. For underarm rash. 30 g 0    clindamycin (CLEOCIN T) 1 % Swab Apply topically 2 (two) times daily. For folliculitis. (Patient not taking: Reported on 2020) 60 each 5    clindamycin phosphate 1% (CLINDAGEL) 1 % gel AAA of face qd for acne. (Patient not taking: Reported on 2020) 60 g 3    ezetimibe (ZETIA) 10 mg tablet Take 1 tablet (10 mg total) by mouth once daily. (Patient not taking: Reported on 2020) 90 tablet 3    iron, carbonyl, (ICAR) 15 mg/1.25 mL Susp Take by mouth.      lamoTRIgine 200 mg TbDL       mupirocin (BACTROBAN) 2 % ointment Apply topically 3 (three) times daily. For broken skin. (Patient not taking: Reported on 2020) 30 g 1    plant stanol mireya (CHOLEST OFF ORAL) Take 1 Dose by mouth once daily.       No current facility-administered medications on file prior to visit.        OB History        0    Para   0  "   Term   0       0    AB   0    Living   0       SAB   0    TAB   0    Ectopic   0    Multiple   0    Live Births   0                 Social History     Socioeconomic History    Marital status: Single     Spouse name: Not on file    Number of children: Not on file    Years of education: Not on file    Highest education level: Not on file   Occupational History    Not on file   Social Needs    Financial resource strain: Not on file    Food insecurity     Worry: Not on file     Inability: Not on file    Transportation needs     Medical: Not on file     Non-medical: Not on file   Tobacco Use    Smoking status: Never Smoker    Smokeless tobacco: Never Used   Substance and Sexual Activity    Alcohol use: Yes     Comment: only on special occasions    Drug use: No    Sexual activity: Yes     Partners: Male     Birth control/protection: Condom   Lifestyle    Physical activity     Days per week: Not on file     Minutes per session: Not on file    Stress: Not on file   Relationships    Social connections     Talks on phone: Not on file     Gets together: Not on file     Attends [employer] service: Not on file     Active member of club or organization: Not on file     Attends meetings of clubs or organizations: Not on file     Relationship status: Not on file   Other Topics Concern    Are you pregnant or think you may be? Not Asked    Breast-feeding Not Asked   Social History Narrative    Lives with Mom, Dad.Sister is in college.1 dog, 1 cat, 1 rabbitIn 10th grade. School is "ok".       Family History   Problem Relation Age of Onset    Hypertension Mother     Heart disease Mother     Arthritis Mother     Cholelithiasis Mother     Hypertension Father     Asthma Sister     Endometriosis Sister     Anemia Sister     Hypertension Maternal Grandmother     Heart disease Maternal Grandfather     Breast cancer Paternal Grandmother     Myasthenia gravis Paternal Grandmother     Myasthenia gravis " Paternal Grandfather     Diabetes Paternal Grandfather     Irritable bowel syndrome Maternal Aunt     Ulcerative colitis Maternal Uncle     Ovarian cancer Other          Past medical and surgical history reviewed.   I have reviewed the patient's medical history in detail and updated the computerized patient record.        Review of System:   General: no chills, fever, night sweats, weight gain or weight loss  Psychological: no depression or suicidal ideation  Breasts: no new or changing breast lumps, nipple discharge or masses.  Respiratory: no cough, shortness of breath, or wheezing  Cardiovascular: no chest pain or dyspnea on exertion  Gastrointestinal: no abdominal pain, change in bowel habits, or black or bloody stools  Genito-Urinary: no incontinence, urinary frequency/urgency or vulvar/vaginal symptoms, pelvic pain or abnormal vaginal bleeding.  Musculoskeletal: no gait disturbance or muscular weakness      Physical Exam:   /70   LMP 06/30/2020 (Exact Date)   Constitutional: She appears alert and responsive. She appears well-developed, well-groomed, and well-nourished. No distress. OverWeight   HENT:   Head: Normocephalic and atraumatic.   Eyes: Conjunctivae and EOM are normal. No scleral icterus.   Neck: Symmetrical. Normal range of motion. Neck supple. No tracheal deviation present. THYROID: without masses or tenderness.  Cardiovascular: Normal rate, no rhythm abnormality noted. Extremities without swelling or edema, warm.    Pulmonary/Chest: Normal respiratory Effort. No distress or retractions. She exhibits no tenderness.  Breasts: are symmetrical.   Right breast exhibits no inverted nipple, no mass, no nipple discharge, no skin change and no tenderness.   Left breast exhibits no inverted nipple, no mass, no nipple discharge, no skin change and no tenderness.  Abdominal: Soft. She exhibits no distension, hernias or masses. There is no tenderness. No enlargement of liver edge or spleen.  There is  no rebound and no guarding.   Genitourinary:    External rectal exam shows no thrombosed external hemorrhoids, no lesions.     Pelvic exam was performed with patient supine.   No labial fusion, and symmetrical.    There is no rash, lesion or injury on the right labia.   There is no rash, lesion or injury on the left labia.   No bleeding and no signs of injury around the vaginal introitus, urethral meatus is normal size and without prolapse or lesions, urethra well supported. The cervix is visualized with no discharge, lesions or friability.   No vaginal discharge found.   No significant Cystocele, Enterocele or rectocele, and cervix and uterus well supported.   Bimanual exam:   The urethra is normal to palpation and there are no palpable vaginal wall masses.   Uterus is not deviated, not enlarged, not fixed, normal shape and not tender.   Cervix exhibits no motion tenderness.    Right adnexum displays no mass or nodularity and no tenderness.   Left adnexum displays no mass or nodularity and no tenderness.  Musculoskeletal: Normal range of motion.   Lymphadenopathy: No inguinal adenopathy present.   Neurological: She is alert and oriented to person, place, and time. Coordination normal.   Skin: Skin is warm and dry. She is not diaphoretic. No rashes, lesions or ulcers.   Psychiatric: She has a normal mood and affect, oriented to person, place, and time.      Assessment:   Normal annual GYN exam  1. Pap smear for cervical cancer screening  Liquid-Based Pap Smear, Screening    HPV High Risk Genotypes, PCR   ? Hydradenitis - not classic    Plan:   PAP  Refill oral contraceptive pills   Change to EES based abx for skin infections.  Follow up in 1 year.  Patient informed will be contacted with results within 2 weeks. Encouraged to please call back or email if she has not heard from us by then.

## 2020-07-29 LAB
HPV HR 12 DNA SPEC QL NAA+PROBE: NEGATIVE
HPV16 AG SPEC QL: NEGATIVE
HPV18 DNA SPEC QL NAA+PROBE: NEGATIVE

## 2020-07-30 LAB
FINAL PATHOLOGIC DIAGNOSIS: NORMAL
Lab: NORMAL

## 2020-09-21 ENCOUNTER — OFFICE VISIT (OUTPATIENT)
Dept: URGENT CARE | Facility: CLINIC | Age: 25
End: 2020-09-21
Payer: COMMERCIAL

## 2020-09-21 VITALS
HEIGHT: 61 IN | BODY MASS INDEX: 51.54 KG/M2 | WEIGHT: 273 LBS | SYSTOLIC BLOOD PRESSURE: 132 MMHG | HEART RATE: 111 BPM | DIASTOLIC BLOOD PRESSURE: 86 MMHG | OXYGEN SATURATION: 98 % | RESPIRATION RATE: 12 BRPM | TEMPERATURE: 98 F

## 2020-09-21 DIAGNOSIS — R05.9 COUGH: ICD-10-CM

## 2020-09-21 DIAGNOSIS — U07.1 COVID-19 VIRUS DETECTED: ICD-10-CM

## 2020-09-21 DIAGNOSIS — U07.1 COVID-19 VIRUS DETECTED: Primary | ICD-10-CM

## 2020-09-21 LAB
CTP QC/QA: YES
SARS-COV-2 RDRP RESP QL NAA+PROBE: POSITIVE

## 2020-09-21 PROCEDURE — 99214 OFFICE O/P EST MOD 30 MIN: CPT | Mod: S$GLB,,, | Performed by: PHYSICIAN ASSISTANT

## 2020-09-21 PROCEDURE — U0002: ICD-10-PCS | Mod: QW,S$GLB,, | Performed by: PHYSICIAN ASSISTANT

## 2020-09-21 PROCEDURE — U0002 COVID-19 LAB TEST NON-CDC: HCPCS | Mod: QW,S$GLB,, | Performed by: PHYSICIAN ASSISTANT

## 2020-09-21 PROCEDURE — 99214 PR OFFICE/OUTPT VISIT, EST, LEVL IV, 30-39 MIN: ICD-10-PCS | Mod: S$GLB,,, | Performed by: PHYSICIAN ASSISTANT

## 2020-09-21 RX ORDER — BENZONATATE 100 MG/1
100 CAPSULE ORAL EVERY 6 HOURS PRN
Qty: 60 CAPSULE | Refills: 1 | Status: SHIPPED | OUTPATIENT
Start: 2020-09-21 | End: 2020-11-03

## 2020-09-21 RX ORDER — ALBUTEROL SULFATE 90 UG/1
2 AEROSOL, METERED RESPIRATORY (INHALATION) EVERY 6 HOURS PRN
Qty: 18 G | Refills: 0 | Status: SHIPPED | OUTPATIENT
Start: 2020-09-21 | End: 2025-01-03

## 2020-09-21 RX ORDER — AZELASTINE 1 MG/ML
1 SPRAY, METERED NASAL 2 TIMES DAILY
Qty: 30 ML | Refills: 0 | Status: SHIPPED | OUTPATIENT
Start: 2020-09-21 | End: 2021-06-01

## 2020-09-21 NOTE — PATIENT INSTRUCTIONS
Your test was POSITIVE for COVID-19 (coronavirus).       Please isolate yourself at home.  You may leave home and/or return to work once the following conditions are met:    If you were not hospitalized and are not severely immunocompromised*:   More than 10 days since symptoms first appeared AND   More than 24 hours fever free without medications AND   Symptoms have improved     If you were hospitalized OR are severely immunocompromised*:   More than 20 days since symptoms first appeared   More than 24 hrs hours fever free without medications   Symptoms have improved    If you had no symptoms but tested postivepositive:   More than 10 days since the date of the first positive test (20 days if severely immunocompromised).   If you develop symptoms, then use the guidelines above.     *Definition of severely immunocompromised:  - Current chemotherapy for cancer  - Untreated HIV with CD4 count less than 200  - Combined primary immunodeficiency disorder  - Prednisone more than 20 mg per day for more than 14 days  - Post-transplant patients    Additional instructions:   Separate yourself from other people and animals in your home.   Call ahead before visiting your doctor.   Wear a facemask when around others.   Cover your coughs and sneezes.   Wash your hands often with soap and water; hand  can be used, too.   Avoid sharing personal household items.   Wipe down surfaces used daily.   Monitor your symptoms. Seek prompt medical attention if your illness is worsening (e.g., difficulty breathing).    Before seeking care, call your healthcare provider.   If you have a medical emergency and need to call 911, notify the dispatch personnel that you have, or are being evaluated for COVID-19. If possible, put on a facemask before emergency medical services arrive.      Contact Tracing    As one of the next steps, you will receive a call or text from the [place] Department of Health ([facility]) COVID-19  Tracing Team. See the contact information below so you know not to ignore the health departments call. It is important that you contact them back immediately so they can help.      Contact Tracer Number:  [phone]  Caller ID for most carriers: [facility]     What is contact tracing?  · Contact tracing is a process that helps identify everyone who has been in close contact with an infected person. Contact tracers let those people know they may have been exposed and guide them on next steps. Confidentiality is important for everyone; no one will be told who may have exposed them to the virus.  · Your involvement is important. The more we know about where and how this virus is spreading, the better chance we have at stopping it from spreading further.  What does exposure mean?  · Exposure means you have been within 6 feet for more than 15 minutes with a person who has or had COVID-19.  What kind of questions do the contact tracers ask?  · A contact tracer will confirm your basic contact information including name, address, phone number, and next of kin, as well as asking about any symptoms you may have had. Theyll also ask you how you think you may have gotten sick, such as places where you may have been exposed to the virus, and people you were with. Those names will never be shared with anyone outside of that call, and will only be used to help trace and stop the spread of the virus.   I have privacy concerns. How will the state use my information?  · Your privacy about your health is important. All calls are completed using call centers that use the appropriate health privacy protection measures (HIPAA compliance), meaning that your patient information is safe. No one will ever ask you any questions related to immigration status. Your health comes first.   Do I have to participate?  · You do not have to participate, but we strongly encourage you to. Contact tracing can help us catch and control new  outbreaks as theyre developing to keep your friends and family safe.   What if I dont hear from anyone?  · If you dont receive a call within 24 hours, you can call the number above right away to inquire about your status. That line is open from 8:00 am - 8:00 p.m., 7 days a week.  Contact tracing saves lives! Together, we have the power to beat this virus and keep our loved ones and neighbors safe.    For more information see CDC link below.      https://www.cdc.gov/coronavirus/2019-ncov/hcp/guidance-prevent-spread.html#precautions        Sources:  [facility], [facility] and [facility]           Sincerely,     SINDHU Augustine

## 2020-09-21 NOTE — PROGRESS NOTES
"Subjective:       Patient ID: Terra Hamilton is a 24 y.o. female.    Vitals:  height is 5' 1" (1.549 m) and weight is 123.8 kg (273 lb). Her temperature is 98 °F (36.7 °C). Her blood pressure is 132/86 and her pulse is 111 (abnormal). Her respiration is 12 and oxygen saturation is 98%.     Chief Complaint: Cough    She has been have a cough, congestion, and trouble breathing for 8 days. Her mom tested positive for covid yesterdsay.    Cough  This is a new problem. The current episode started 1 to 4 weeks ago. The problem occurs constantly. The cough is productive of sputum. Associated symptoms include nasal congestion, rhinorrhea and shortness of breath. Pertinent negatives include no chest pain, chills, ear congestion, ear pain, fever, headaches, myalgias, postnasal drip, rash, sore throat or wheezing. Nothing aggravates the symptoms. Treatments tried: benadryl. The treatment provided moderate relief. Her past medical history is significant for bronchitis and environmental allergies. There is no history of asthma, COPD, emphysema or pneumonia.       Constitution: Negative for chills and fever.   HENT: Negative for ear pain, postnasal drip and sore throat.    Cardiovascular: Negative for chest pain and palpitations.   Respiratory: Positive for cough and shortness of breath. Negative for wheezing.    Gastrointestinal: Negative for nausea, vomiting and diarrhea.   Musculoskeletal: Negative for muscle cramps and muscle ache.   Skin: Negative for rash and hives.   Allergic/Immunologic: Positive for environmental allergies. Negative for hives.   Neurological: Negative for dizziness and headaches.       Objective:      Physical Exam   Constitutional: She is oriented to person, place, and time. She appears well-developed. She is cooperative.  Non-toxic appearance. She does not appear ill. No distress.   HENT:   Head: Normocephalic and atraumatic.   Ears:   Right Ear: Hearing and external ear normal.   Left Ear: " Hearing and external ear normal.   Nose: Nose normal. No mucosal edema, rhinorrhea or nasal deformity. No epistaxis. Right sinus exhibits no maxillary sinus tenderness and no frontal sinus tenderness. Left sinus exhibits no maxillary sinus tenderness and no frontal sinus tenderness.   Mouth/Throat: Uvula is midline, oropharynx is clear and moist and mucous membranes are normal. Mucous membranes are moist. No trismus in the jaw. Normal dentition. No uvula swelling. No oropharyngeal exudate, posterior oropharyngeal edema or posterior oropharyngeal erythema. Oropharynx is clear.   Eyes: Conjunctivae and lids are normal. No scleral icterus.   Neck: Trachea normal, full passive range of motion without pain and phonation normal. Neck supple. No neck rigidity. No edema and no erythema present.   Cardiovascular: Normal rate, regular rhythm, normal heart sounds and normal pulses.   Pulmonary/Chest: Effort normal and breath sounds normal. No respiratory distress. She has no decreased breath sounds. She has no wheezes. She has no rhonchi. She has no rales.   Abdominal: Normal appearance.   Musculoskeletal: Normal range of motion.         General: No deformity.   Neurological: She is alert and oriented to person, place, and time. She exhibits normal muscle tone. Coordination normal.   Skin: Skin is warm, dry, intact, not diaphoretic and not pale. Psychiatric: Her speech is normal and behavior is normal. Mood, judgment and thought content normal.   Nursing note and vitals reviewed.        Assessment:       1. COVID-19 virus detected    2. Cough        Plan:         COVID-19 virus detected    Cough  -     POCT COVID-19 Rapid Screening    Results for orders placed or performed in visit on 09/21/20   POCT COVID-19 Rapid Screening   Result Value Ref Range    POC Rapid COVID Positive (A) Negative     Acceptable Yes        Other orders  -     benzonatate (TESSALON PERLES) 100 MG capsule; Take 1 capsule (100 mg total) by  mouth every 6 (six) hours as needed.  Dispense: 60 capsule; Refill: 1  -     albuterol (VENTOLIN HFA) 90 mcg/actuation inhaler; Inhale 2 puffs into the lungs every 6 (six) hours as needed for Wheezing. Rescue  Dispense: 18 g; Refill: 0  -     azelastine (ASTELIN) 137 mcg (0.1 %) nasal spray; 1 spray (137 mcg total) by Nasal route 2 (two) times daily.  Dispense: 30 mL; Refill: 0    Patient Instructions   Your test was POSITIVE for COVID-19 (coronavirus).       Please isolate yourself at home.  You may leave home and/or return to work once the following conditions are met:    If you were not hospitalized and are not severely immunocompromised*:   More than 10 days since symptoms first appeared AND   More than 24 hours fever free without medications AND   Symptoms have improved     If you were hospitalized OR are severely immunocompromised*:   More than 20 days since symptoms first appeared   More than 24 hrs hours fever free without medications   Symptoms have improved    If you had no symptoms but tested postivepositive:   More than 10 days since the date of the first positive test (20 days if severely immunocompromised).   If you develop symptoms, then use the guidelines above.     *Definition of severely immunocompromised:  - Current chemotherapy for cancer  - Untreated HIV with CD4 count less than 200  - Combined primary immunodeficiency disorder  - Prednisone more than 20 mg per day for more than 14 days  - Post-transplant patients    Additional instructions:   Separate yourself from other people and animals in your home.   Call ahead before visiting your doctor.   Wear a facemask when around others.   Cover your coughs and sneezes.   Wash your hands often with soap and water; hand  can be used, too.   Avoid sharing personal household items.   Wipe down surfaces used daily.   Monitor your symptoms. Seek prompt medical attention if your illness is worsening (e.g., difficulty breathing).     Before seeking care, call your healthcare provider.   If you have a medical emergency and need to call 911, notify the dispatch personnel that you have, or are being evaluated for COVID-19. If possible, put on a facemask before emergency medical services arrive.      Contact Tracing    As one of the next steps, you will receive a call or text from the [place] Department of Health ([facility]) COVID-19 Tracing Team. See the contact information below so you know not to ignore the health departments call. It is important that you contact them back immediately so they can help.      Contact Tracer Number:  [phone]  Caller ID for most carriers: [place] Dept Health     What is contact tracing?  · Contact tracing is a process that helps identify everyone who has been in close contact with an infected person. Contact tracers let those people know they may have been exposed and guide them on next steps. Confidentiality is important for everyone; no one will be told who may have exposed them to the virus.  · Your involvement is important. The more we know about where and how this virus is spreading, the better chance we have at stopping it from spreading further.  What does exposure mean?  · Exposure means you have been within 6 feet for more than 15 minutes with a person who has or had COVID-19.  What kind of questions do the contact tracers ask?  · A contact tracer will confirm your basic contact information including name, address, phone number, and next of kin, as well as asking about any symptoms you may have had. Theyll also ask you how you think you may have gotten sick, such as places where you may have been exposed to the virus, and people you were with. Those names will never be shared with anyone outside of that call, and will only be used to help trace and stop the spread of the virus.   I have privacy concerns. How will the state use my information?  · Your privacy about your health is important. All calls  are completed using call centers that use the appropriate health privacy protection measures (HIPAA compliance), meaning that your patient information is safe. No one will ever ask you any questions related to immigration status. Your health comes first.   Do I have to participate?  · You do not have to participate, but we strongly encourage you to. Contact tracing can help us catch and control new outbreaks as theyre developing to keep your friends and family safe.   What if I dont hear from anyone?  · If you dont receive a call within 24 hours, you can call the number above right away to inquire about your status. That line is open from 8:00 am - 8:00 p.m., 7 days a week.  Contact tracing saves lives! Together, we have the power to beat this virus and keep our loved ones and neighbors safe.    For more information see CDC link below.      https://www.cdc.gov/coronavirus/2019-ncov/hcp/guidance-prevent-spread.html#precautions        Sources:  [facility], [place] [facility] and [facility]           Sincerely,     SINDHU Augustine

## 2020-09-21 NOTE — LETTER
[address] [address], [address] ? [place] [address] ? Phone [phone] ? Fax [phone]           Return to Work/School    Patient: Terra Hamilton  YOB: 1995   Date: 09/21/2020      To Whom It May Concern:     Terra Hamilton was in contact with/seen in my office on 09/21/2020. COVID-19 is present in our communities across the [facility]. Not all patients are eligible or appropriate to be tested. In this situation, your employee meets the following criteria:     Terra Hamilton has met the criteria for COVID-19 testing and has a POSITIVE result. She can return to work once they are asymptomatic for 24 hours without the use of fever reducing medications AND at least ten days from the start of symptoms (or from the first positive result if they have no symptoms).      If you have any questions or concerns, or if I can be of further assistance, please do not hesitate to contact me.     Sincerely,    SINDHU Augustine

## 2020-10-31 ENCOUNTER — PATIENT OUTREACH (OUTPATIENT)
Dept: ADMINISTRATIVE | Facility: OTHER | Age: 25
End: 2020-10-31

## 2020-10-31 NOTE — PROGRESS NOTES
Health Maintenance Due   Topic Date Due    HIV Screening  12/06/2011    HPV Vaccines (3 - 3-dose series) 07/22/2016    Influenza Vaccine (1) 08/01/2020     Updates were requested from care everywhere.  Chart was reviewed for overdue Proactive [facility] Encounters (KRISTY) topics (CRS, Breast Cancer Screening, Eye exam)  Health Maintenance has been updated.  LINKS immunization registry triggered.  Immunizations were reconciled.

## 2020-11-03 ENCOUNTER — OFFICE VISIT (OUTPATIENT)
Dept: DERMATOLOGY | Facility: CLINIC | Age: 25
End: 2020-11-03
Payer: COMMERCIAL

## 2020-11-03 VITALS — BODY MASS INDEX: 51.58 KG/M2 | RESPIRATION RATE: 20 BRPM | HEIGHT: 61 IN

## 2020-11-03 DIAGNOSIS — B07.8 COMMON WART: ICD-10-CM

## 2020-11-03 DIAGNOSIS — L73.0 ACNE KELOIDALIS NUCHAE: Primary | ICD-10-CM

## 2020-11-03 PROCEDURE — 3008F BODY MASS INDEX DOCD: CPT | Mod: CPTII,S$GLB,, | Performed by: DERMATOLOGY

## 2020-11-03 PROCEDURE — 99213 PR OFFICE/OUTPT VISIT, EST, LEVL III, 20-29 MIN: ICD-10-PCS | Mod: 25,S$GLB,, | Performed by: DERMATOLOGY

## 2020-11-03 PROCEDURE — 17110 PR DESTRUCTION BENIGN LESIONS UP TO 14: ICD-10-PCS | Mod: S$GLB,,, | Performed by: DERMATOLOGY

## 2020-11-03 PROCEDURE — 3008F PR BODY MASS INDEX (BMI) DOCUMENTED: ICD-10-PCS | Mod: CPTII,S$GLB,, | Performed by: DERMATOLOGY

## 2020-11-03 PROCEDURE — 99999 PR PBB SHADOW E&M-EST. PATIENT-LVL III: ICD-10-PCS | Mod: PBBFAC,,, | Performed by: DERMATOLOGY

## 2020-11-03 PROCEDURE — 17110 DESTRUCTION B9 LES UP TO 14: CPT | Mod: S$GLB,,, | Performed by: DERMATOLOGY

## 2020-11-03 PROCEDURE — 99213 OFFICE O/P EST LOW 20 MIN: CPT | Mod: 25,S$GLB,, | Performed by: DERMATOLOGY

## 2020-11-03 PROCEDURE — 99999 PR PBB SHADOW E&M-EST. PATIENT-LVL III: CPT | Mod: PBBFAC,,, | Performed by: DERMATOLOGY

## 2020-11-03 RX ORDER — BETAMETHASONE VALERATE 0.1 %
LOTION (ML) TOPICAL
Qty: 60 ML | Refills: 3 | Status: SHIPPED | OUTPATIENT
Start: 2020-11-03 | End: 2021-07-13

## 2020-11-03 NOTE — PROGRESS NOTES
Subjective:       Patient ID:  Terra Hamilton is a 24 y.o. female who presents for   Chief Complaint   Patient presents with    Genital Warts     Pt presents with new complaint.  Last O/V with JACOBIan Lopez 1/29/20    C/o wart to left index finger onset 2 months ago - no treatment provided to this lesion. C/o rash on back of scalp.  Pt previously treated by provider 5/2018 for diagnosis acne keloidalis nuchae.  Pt reports JACOB Lopez has been treating her scalp with topicals (clinda and BP wash) but reports only the steroid injections provided by Dr. Caldwell seem to help.  Overall seems improved from the Summer when it was flared.     No Phx NMSC  No Fhx melanoma          Review of Systems   Constitutional: Negative for fever and chills.   HENT: Negative for nosebleeds.    Respiratory: Negative for cough.    Gastrointestinal: Negative for nausea and vomiting.   Skin: Positive for activity-related sunscreen use. Negative for itching, rash, dry skin, daily sunscreen use and recent sunburn.   Hematologic/Lymphatic: Does not bruise/bleed easily.        Objective:    Physical Exam   Constitutional: She appears well-developed and well-nourished. No distress.   Neurological: She is alert and oriented to person, place, and time. She is not disoriented.   Psychiatric: She has a normal mood and affect.   Skin:   Areas Examined (abnormalities noted in diagram):   Scalp / Hair Palpated and Inspected  Head / Face Inspection Performed  Neck Inspection Performed  Chest / Axilla Inspection Performed  Back Inspection Performed  RUE Inspected  LUE Inspection Performed                       Diagram Legend     Erythematous scaling macule/papule c/w actinic keratosis       Vascular papule c/w angioma      Pigmented verrucoid papule/plaque c/w seborrheic keratosis      Yellow umbilicated papule c/w sebaceous hyperplasia      Irregularly shaped tan macule c/w lentigo     1-2 mm smooth white papules consistent with Milia      Movable  subcutaneous cyst with punctum c/w epidermal inclusion cyst      Subcutaneous movable cyst c/w pilar cyst      Firm pink to brown papule c/w dermatofibroma      Pedunculated fleshy papule(s) c/w skin tag(s)      Evenly pigmented macule c/w junctional nevus     Mildly variegated pigmented, slightly irregular-bordered macule c/w mildly atypical nevus      Flesh colored to evenly pigmented papule c/w intradermal nevus       Pink pearly papule/plaque c/w basal cell carcinoma      Erythematous hyperkeratotic cursted plaque c/w SCC      Surgical scar with no sign of skin cancer recurrence      Open and closed comedones      Inflammatory papules and pustules      Verrucoid papule consistent consistent with wart     Erythematous eczematous patches and plaques     Dystrophic onycholytic nail with subungual debris c/w onychomycosis     Umbilicated papule    Erythematous-base heme-crusted tan verrucoid plaque consistent with inflamed seborrheic keratosis     Erythematous Silvery Scaling Plaque c/w Psoriasis     See annotation      Assessment / Plan:        Acne keloidalis nuchae  Continue clinda qday  Consider ILK if not improving with topicals  -     betamethasone valerate 0.1% (VALISONE) 0.1 % Lotn; AAA scalp bid  Dispense: 60 mL; Refill: 3    Common wart  Cryosurgery procedure note:    Verbal consent from the patient is obtained. Liquid nitrogen cryosurgery is applied to 1 verruca with prior paring, as detailed in the physical exam, to produce a freeze injury. 2 consecutive freeze thaw cycles are applied to each verruca. The patient is aware that blisters (possibly blood blisters) may form.    Discussed risk scarring and hypopigmentation and possible need for repeat treatments               Follow up in about 6 months (around 5/3/2021).

## 2020-11-04 ENCOUNTER — PATIENT MESSAGE (OUTPATIENT)
Dept: FAMILY MEDICINE | Facility: CLINIC | Age: 25
End: 2020-11-04

## 2020-11-04 DIAGNOSIS — F39 MOOD DISORDER: Primary | ICD-10-CM

## 2020-11-04 NOTE — TELEPHONE ENCOUNTER
I signed the referral.  Please help her schedule or at least instruct her on who to contact to schedule.  Also please make certain that she is not having any emergent symptoms or concerns.

## 2020-11-10 ENCOUNTER — OFFICE VISIT (OUTPATIENT)
Dept: NEUROLOGY | Facility: CLINIC | Age: 25
End: 2020-11-10
Payer: COMMERCIAL

## 2020-11-10 VITALS
TEMPERATURE: 98 F | BODY MASS INDEX: 49.93 KG/M2 | SYSTOLIC BLOOD PRESSURE: 112 MMHG | HEIGHT: 61 IN | RESPIRATION RATE: 16 BRPM | WEIGHT: 264.44 LBS | DIASTOLIC BLOOD PRESSURE: 76 MMHG | HEART RATE: 89 BPM

## 2020-11-10 DIAGNOSIS — E28.2 PCOS (POLYCYSTIC OVARIAN SYNDROME): ICD-10-CM

## 2020-11-10 DIAGNOSIS — G40.309 GENERALIZED EPILEPSY: Primary | ICD-10-CM

## 2020-11-10 PROCEDURE — 3008F BODY MASS INDEX DOCD: CPT | Mod: CPTII,S$GLB,, | Performed by: PSYCHIATRY & NEUROLOGY

## 2020-11-10 PROCEDURE — 99204 PR OFFICE/OUTPT VISIT, NEW, LEVL IV, 45-59 MIN: ICD-10-PCS | Mod: S$GLB,,, | Performed by: PSYCHIATRY & NEUROLOGY

## 2020-11-10 PROCEDURE — 99999 PR PBB SHADOW E&M-EST. PATIENT-LVL V: ICD-10-PCS | Mod: PBBFAC,,, | Performed by: PSYCHIATRY & NEUROLOGY

## 2020-11-10 PROCEDURE — 99999 PR PBB SHADOW E&M-EST. PATIENT-LVL V: CPT | Mod: PBBFAC,,, | Performed by: PSYCHIATRY & NEUROLOGY

## 2020-11-10 PROCEDURE — 3008F PR BODY MASS INDEX (BMI) DOCUMENTED: ICD-10-PCS | Mod: CPTII,S$GLB,, | Performed by: PSYCHIATRY & NEUROLOGY

## 2020-11-10 PROCEDURE — 99204 OFFICE O/P NEW MOD 45 MIN: CPT | Mod: S$GLB,,, | Performed by: PSYCHIATRY & NEUROLOGY

## 2020-11-10 NOTE — PROGRESS NOTES
"    Date: 11/10/2020    Patient ID: Terra Hamilton is a 24 y.o. female.    Referring Provider:  Wagner Elizabeth MD    Chief Complaint: Seizures      History of Present Illness:  Ms. Hamilton is a 24 y.o. female who presents referred by Wagner Elizabeth MD today for evaluation of epilepsy. I also reviewed her     She had onset of seizures in 2010/2011. She thinks that 2007 was actually her first seizure though. She recalls being in class and "blanked out". She woke up to seeing everyone move around to go to another class. In 7th and 8th grade, her friends would notice that she was zoning out. She recalls that she would feel like her heart dropped into her stomach and she would see white. She knew it was happening in the moment and couldn't talk or do anything about it. She would lose awareness. Her mother noted that one pupil would get big and one small. She would just stop motionless. No eye fluttering, lip smacking, or automatisms. She has never had a convulsion. She notices lack of sleep and stress as triggers. Strobe lights bother her.     When she was diagnosed, she was started on lamictal and ethosuximide at the same time. She has never been on any other medications. She was followed by Dr. Mcguire and then Dr. Elizabeth. [facility] 2014 showed "generalized burst of epileptic activity".     Her last seizure was about 6 years ago. She would have a numbing sensation travel up her head and she would feel disoriented in 2019. She states the numbness would come from both ears and then travel up the back of her head. This would last about 10-15 seconds. She felt a little dizzy briefly one time. Dr. Elizabeth felt this was a migraine. They last one of these that she had was about 1 month ago. Before that, it was in March.     She is on lamictal 200 mg BID. Her last lamictal level was years ago. She was told it was low. She was previously on ethosuximide as well but was weaned off that in November " "2018. She notes her last EEG she still had "signs of seizures" so they stayed on the lamictal. This was in December 2018. She has never had a MRI brain.      Her maternal cousin has epilepsy--he has convulsions. She notes her parents told her she was a sick baby and she cried a lot. She had big tonsils and adenoids and they were told she did not get adequate oxygen to the brain. She had a minor concussion with softball hitting her head in high school. Her mother also said as a baby she stared so wonders if she was having seizures as a baby too.     Review of Systems:   14 systems reviewed - All other systems were reviewed and negative except as mentioned in the HPI    Allergies:  Review of patient's allergies indicates:  No Known Allergies    Current Medications:  Current Outpatient Medications   Medication Sig Dispense Refill    albuterol (VENTOLIN HFA) 90 mcg/actuation inhaler Inhale 2 puffs into the lungs every 6 (six) hours as needed for Wheezing. Rescue 18 g 0    azelastine (ASTELIN) 137 mcg (0.1 %) nasal spray 1 spray (137 mcg total) by Nasal route 2 (two) times daily. 30 mL 0    benzoyl peroxide (BP WASH) 10 % external wash Apply topically once daily. Rinse completely.  May bleach clothing 227 g 11    betamethasone valerate 0.1% (VALISONE) 0.1 % Lotn AAA scalp bid 60 mL 3    clindamycin phosphate 1% (CLINDAGEL) 1 % gel AAA of face qd for acne. 60 g 3    clobetasol (TEMOVATE) 0.05 % external solution AAA of scalp bid prn.  Strong steroid- do not apply to face or folds of skin. 25 mL 0    erythromycin 250 mg EC capsule Take 1 capsule (250 mg total) by mouth once daily. 30 capsule 6    ezetimibe (ZETIA) 10 mg tablet Take 1 tablet (10 mg total) by mouth once daily. 90 tablet 3    fish oil-omega-3 fatty acids 300-1,000 mg capsule Take 1 capsule by mouth once daily.      ibuprofen (ADVIL,MOTRIN) 800 MG tablet Take one po bid to tid with food 12 tablet 0    lamotrigine (LAMICTAL) 200 MG tablet One twice " daily (Patient taking differently: Take 200 mg by mouth 2 (two) times daily. One twice daily) 60 tablet 0    LINZESS 145 mcg Cap capsule TAKE 1 CAPSULE (145 MCG TOTAL) BY MOUTH ONCE DAILY. 30 capsule 5    metFORMIN (GLUCOPHAGE-XR) 500 MG XR 24hr tablet TAKE 2 TABLETS (1,000 MG TOTAL) BY MOUTH DAILY WITH DINNER OR EVENING MEAL. 180 tablet 1    minocycline (MINOCIN,DYNACIN) 50 MG Cap Take 1 capsule (50 mg total) by mouth once daily. 90 capsule 3    mupirocin (BACTROBAN) 2 % ointment Apply topically 3 (three) times daily. For broken skin. 30 g 1    norgestimate-ethinyl estradioL (TRI-LO-SPRINTEC) 0.18/0.215/0.25 mg-25 mcg tablet Take 1 tablet by mouth once daily. 84 tablet 3    tazarotene (TAZORAC) 0.05 % Crea cream Thin film to face qhs, take night off if irritation develops 60 g 3    triamcinolone acetonide 0.025% (KENALOG) 0.025 % cream Apply topically 2 (two) times daily. For underarm rash. 30 g 0    ezetimibe (ZETIA ORAL) Zetia      lamoTRIgine 200 mg TbDL       multivit-min/ferrous fumarate (MULTI VITAMIN ORAL) Take 1 Dose by mouth once daily.       No current facility-administered medications for this visit.        Past Medical History:  Past Medical History:   Diagnosis Date    Focal seizures     History of polycystic ovarian disease     Seizures     2013    Speech delay        Past Surgical History:  Past Surgical History:   Procedure Laterality Date    BUNIONECTOMY Right 6/1/2018    Procedure: BUNIONECTOMY;  Surgeon: Lm Obrien DPM;  Location: [facility];  Service: Podiatry;  Laterality: Right;    CORRECTION OF HAMMER TOE Right 6/1/2018    Procedure: CORRECTION, HAMMER TOE;  Surgeon: Lm Obrien DPM;  Location: [facility];  Service: Podiatry;  Laterality: Right;    FOOT ARTHROPLASTY Right 6/1/2018    Procedure: orif subtalar joint with Hyprocure implant ;  Surgeon: Lm Obrien DPM;  Location: [facility];  Service: Podiatry;  Laterality: Right;    FOOT SURGERY       "TONSILLECTOMY, ADENOIDECTOMY, BILATERAL MYRINGOTOMY AND TUBES      WISDOM TOOTH EXTRACTION         Family History:  family history includes Anemia in her sister; Arthritis in her mother; Asthma in her sister; Breast cancer in her paternal grandmother; Cholelithiasis in her mother; Diabetes in her paternal grandfather; Endometriosis in her sister; Heart disease in her maternal grandfather and mother; Hypertension in her father, maternal grandmother, and mother; Irritable bowel syndrome in her maternal aunt; Myasthenia gravis in her paternal grandfather and paternal grandmother; Ovarian cancer in her other; Ulcerative colitis in her maternal uncle.    Social History:   reports that she has never smoked. She has never used smokeless tobacco. She reports current alcohol use. She reports that she does not use drugs.    Physical Exam:  Vitals:    11/10/20 0800   BP: 112/76   Pulse: 89   Resp: 16   Temp: 98.4 °F (36.9 °C)   Weight: 120 kg (264 lb 7.1 oz)   Height: 5' 1" (1.549 m)   PainSc: 0-No pain     Body mass index is 49.97 kg/m².  General: Well developed, well nourished.  No acute distress.  Eyes: no ocular hemorrhages  Musculoskeletal: No obvious joint deformities, moves all extremities well.  Peripheral vascular: No edema noted    Neurological Exam:  Mental status: Awake, alert, and oriented to person, place, and time. Recent and remote memory appear to be intact. Attention, concentration, and fund of knowledge regarding recent events normal.   Speech/Language: No dysarthria or aphasia on conversation.   Cranial nerves: Visual fields full. Pupils equal round and reactive to light, extraocular movements intact, facial strength and sensation intact bilaterally, palate and tongue not examined due to COVID-19 precautions, hearing grossly intact bilaterally. Shoulder shrug normal bilaterally.   Motor: 5 out of 5 strength throughout the upper and lower extremities bilaterally. Normal bulk and tone.   Sensation: Intact to " light touch and vibration bilaterally.  DTR: 2+ at the knees and biceps bilaterally.  Coordination: Finger-nose-finger testing and rapid alternating movements normal bilaterally. No tremor.   Gait: Normal gait        Data:  I have personally reviewed the referring provider's notes, labs, & imaging made available to me today.       Labs:  CBC:   Lab Results   Component Value Date    WBC 9.47 08/12/2019    HGB 12.8 08/12/2019    HCT 39.5 08/12/2019     (H) 08/12/2019    MCV 93 08/12/2019    RDW 12.6 08/12/2019     BMP:   Lab Results   Component Value Date     06/01/2020    K 3.9 06/01/2020     06/01/2020    CO2 22 (L) 06/01/2020    BUN 10 06/01/2020    CREATININE 0.8 06/01/2020    GLU 80 06/01/2020    CALCIUM 9.4 06/01/2020     LFTS;   Lab Results   Component Value Date    PROT 7.0 06/01/2020    ALBUMIN 3.4 (L) 06/01/2020    BILITOT 0.3 06/01/2020    AST 16 06/01/2020    ALKPHOS 64 06/01/2020    ALT 14 06/01/2020     COAGS:   Lab Results   Component Value Date    INR 1.0 08/12/2019     FLP:   Lab Results   Component Value Date    CHOL 211 (H) 06/01/2020    HDL 57 06/01/2020    LDLCALC 122.0 06/01/2020    TRIG 160 (H) 06/01/2020    CHOLHDL 27.0 06/01/2020         Imaging:  MRI brain ordered    Assessment and Plan:  Ms. Hamilton is a 24 y.o. female referred to me by Wagner Elizabeth MD for evaluation of epilepsy. She has reported generalized discharges on her EEG. Will repeat EEG as it has been 2 years since she had one and perform MRI brain as she has never had this done. We will continue lamictal 200 mg BID for now and consider increasing her dose pending trough level. These numbing episodes are nonspecific but sound very short to be migrainous phenomenon. They also don't sound classic for epileptic phenomenon though.     Patient was counseled on seizure safety including driving laws, water safety, and operation of machinery. We reviewed the importance of adequate sleep, compliance with medication,  and limiting medications that may lower the seizure threshold. We reviewed the risks of pregnancy with medications and epilepsy. I discussed that the birth control she takes for PCOS interacts with lamictal and if she changes this/comes off of it in the future, we may need to lower her lamictal dose.     Generalized epilepsy  -     EEG,w/awake & asleep record; Future  -     Lamotrigine level; Future; Expected date: 11/10/2020  -     MRI Brain Epilepsy Without Contrast; Future; Expected date: 11/10/2020  -     Comprehensive metabolic panel; Future; Expected date: 11/10/2020    PCOS (polycystic ovarian syndrome)

## 2020-11-10 NOTE — PATIENT INSTRUCTIONS
You can try magensium oxide 400 mg daily for migraine aura.     During a seizure:  - Ensure the person is in a safe place, remove hard or sharp objects from the area  - Turn the person on their side  - Never force anything into their mouth  - Keep on eye on the time, if the jerking/shaking/tensing of the muscles lasts > 5 minutes, call 911.    After a seizure:  - Allow them to lie quietly, gently call them to see if they wake up  - If there is injury or if they are not waking up within 5 minutes, call 911    Safety:  - Take showers, not baths  - Take care when around bodies of water (swimming pools, lakes, etc) and wear protective equipment. Do not swim alone  - Use equipment with automatic shutoff safety features  - Do not climb ladders or use heavy machinery until seizure-free for 6 months  - Use the back burners when cooking  - If you have children, change diapers on the floor and avoid holding them while taking stairs  - Do not drive until seizure-free for 6 months    For women:  - Take folic acid supplement daily (4 mg daily recommended)  - Know that birth control can affect your medication and your medication may make birth control less effective  - If you do become pregnant or are thinking of becoming pregnant, be sure to talk to me  - It is important to continue taking your seizure medication while pregnant and we need to monitor you much more closely during pregnancy    Triggers:  - Be sure to take you medication as scheduled without missed doses  - Be sure to get 8 hours of sleep each night  - Certain medications to avoid:   - Benadryl (diphenhydramine)   - Tramadol (Ultram)   - Certain antibiotics in the fluoroquinolone class (levaquin or cipro)   - Wellbutrin    - Chantix   - Alcohol   - Other illegal drugs or stimulant medications  - Herbal supplements to avoid that can lower the seizure threshold:   - Asafoetida, Borage, Ephedra, Ergot, Eucalyptus, Evening primrose, Ginkgo biloba, Ginseng, Pennyroyal,  Goran, Wm, Star anise, Star fruit, Wormwood, and Yohimbine

## 2020-11-10 NOTE — LETTER
November 10, 2020      Wagner Elizabeth MD  [address] [address] [address]           [place] [address] [place] [address]  Phone: [phone]  Fax: [phone]          Patient: Terra Hamilton   MR Number: [id number]   YOB: 1995   Date of Visit: 11/10/2020       Dear Dr. Wagner Elizabeth:    Thank you for referring Terra Hamilton to me for evaluation. Attached you will find relevant portions of my assessment and plan of care.    If you have questions, please do not hesitate to call me. I look forward to following Terra Hamilton along with you.    Sincerely,    Mireille Salinas MD    Enclosure  CC:  No Recipients    If you would like to receive this communication electronically, please contact [email or web address] or [phone] to request more information on [employer] Link access.    For providers and/or their staff who would like to refer a patient to [facility], please contact us through our one-stop-shop provider referral line, [facility], at [phone].    If you feel you have received this communication in error or would no longer like to receive these types of communications, please e-mail [email or web address]

## 2020-11-17 ENCOUNTER — PATIENT MESSAGE (OUTPATIENT)
Dept: DERMATOLOGY | Facility: CLINIC | Age: 25
End: 2020-11-17

## 2020-11-24 ENCOUNTER — OFFICE VISIT (OUTPATIENT)
Dept: PSYCHIATRY | Facility: CLINIC | Age: 25
End: 2020-11-24
Payer: COMMERCIAL

## 2020-11-24 DIAGNOSIS — F43.22 ADJUSTMENT DISORDER WITH ANXIETY: Primary | ICD-10-CM

## 2020-11-24 PROCEDURE — 90791 PR PSYCHIATRIC DIAGNOSTIC EVALUATION: ICD-10-PCS | Mod: S$GLB,,, | Performed by: PSYCHOLOGIST

## 2020-11-24 PROCEDURE — 90791 PSYCH DIAGNOSTIC EVALUATION: CPT | Mod: S$GLB,,, | Performed by: PSYCHOLOGIST
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
## 2020-11-25 ENCOUNTER — LAB VISIT (OUTPATIENT)
Dept: LAB | Facility: HOSPITAL | Age: 25
End: 2020-11-25
Attending: PSYCHIATRY & NEUROLOGY
Payer: COMMERCIAL

## 2020-11-25 DIAGNOSIS — G40.309 GENERALIZED EPILEPSY: ICD-10-CM

## 2020-11-25 LAB
ALBUMIN SERPL BCP-MCNC: 3.4 G/DL (ref 3.5–5.2)
ALP SERPL-CCNC: 65 U/L (ref 55–135)
ALT SERPL W/O P-5'-P-CCNC: 25 U/L (ref 10–44)
ANION GAP SERPL CALC-SCNC: 9 MMOL/L (ref 8–16)
AST SERPL-CCNC: 19 U/L (ref 10–40)
BILIRUB SERPL-MCNC: 0.4 MG/DL (ref 0.1–1)
BUN SERPL-MCNC: 13 MG/DL (ref 6–20)
CALCIUM SERPL-MCNC: 8.7 MG/DL (ref 8.7–10.5)
CHLORIDE SERPL-SCNC: 108 MMOL/L (ref 95–110)
CO2 SERPL-SCNC: 22 MMOL/L (ref 23–29)
CREAT SERPL-MCNC: 0.8 MG/DL (ref 0.5–1.4)
EST. GFR  (AFRICAN AMERICAN): >60 ML/MIN/1.73 M^2
EST. GFR  (NON AFRICAN AMERICAN): >60 ML/MIN/1.73 M^2
GLUCOSE SERPL-MCNC: 85 MG/DL (ref 70–110)
POTASSIUM SERPL-SCNC: 4.6 MMOL/L (ref 3.5–5.1)
PROT SERPL-MCNC: 6.7 G/DL (ref 6–8.4)
SODIUM SERPL-SCNC: 139 MMOL/L (ref 136–145)

## 2020-11-25 PROCEDURE — 80175 DRUG SCREEN QUAN LAMOTRIGINE: CPT

## 2020-11-25 PROCEDURE — 36415 COLL VENOUS BLD VENIPUNCTURE: CPT | Mod: PO

## 2020-11-25 PROCEDURE — 80053 COMPREHEN METABOLIC PANEL: CPT

## 2020-11-30 ENCOUNTER — TELEPHONE (OUTPATIENT)
Dept: NEUROLOGY | Facility: CLINIC | Age: 25
End: 2020-11-30

## 2020-11-30 ENCOUNTER — PATIENT MESSAGE (OUTPATIENT)
Dept: NEUROLOGY | Facility: CLINIC | Age: 25
End: 2020-11-30

## 2020-11-30 DIAGNOSIS — G40.909 EPILEPSY WITHOUT STATUS EPILEPTICUS, NOT INTRACTABLE: ICD-10-CM

## 2020-11-30 LAB — LAMOTRIGINE SERPL-MCNC: 2.2 UG/ML (ref 2–15)

## 2020-11-30 RX ORDER — LAMOTRIGINE 200 MG/1
200 TABLET ORAL 2 TIMES DAILY
Qty: 60 TABLET | Refills: 11 | Status: SHIPPED | OUTPATIENT
Start: 2020-11-30 | End: 2021-06-01 | Stop reason: SDUPTHER

## 2020-11-30 RX ORDER — LAMOTRIGINE 25 MG/1
50 TABLET ORAL 2 TIMES DAILY
Qty: 120 TABLET | Refills: 11 | Status: SHIPPED | OUTPATIENT
Start: 2020-11-30 | End: 2021-01-08

## 2020-11-30 NOTE — TELEPHONE ENCOUNTER
Lamictal trough level is low at 2.2. Will increase lamictal dose to 250 mg twice daily. Can increase further if the tingling episodes continue.

## 2020-12-02 ENCOUNTER — OFFICE VISIT (OUTPATIENT)
Dept: PSYCHIATRY | Facility: CLINIC | Age: 25
End: 2020-12-02
Payer: COMMERCIAL

## 2020-12-02 DIAGNOSIS — F43.22 ADJUSTMENT DISORDER WITH ANXIETY: Primary | ICD-10-CM

## 2020-12-02 PROCEDURE — 90837 PSYTX W PT 60 MINUTES: CPT | Mod: S$GLB,,, | Performed by: PSYCHOLOGIST

## 2020-12-02 PROCEDURE — 99999 PR PBB SHADOW E&M-EST. PATIENT-LVL II: ICD-10-PCS | Mod: PBBFAC,,, | Performed by: PSYCHOLOGIST

## 2020-12-02 PROCEDURE — 90837 PR PSYCHOTHERAPY W/PATIENT, 60 MIN: ICD-10-PCS | Mod: S$GLB,,, | Performed by: PSYCHOLOGIST

## 2020-12-02 PROCEDURE — 99999 PR PBB SHADOW E&M-EST. PATIENT-LVL II: CPT | Mod: PBBFAC,,, | Performed by: PSYCHOLOGIST

## 2020-12-02 NOTE — PROGRESS NOTES
"Individual Psychotherapy (PhD/LCSW)    11/23/2020    Site:  [facility], [place], operated by [facility]         Therapeutic Intervention: Met with patient.  Outpatient - Behavior modifying psychotherapy 60 min - CPT code 08344    Chief complaint/reason for encounter: Anxiety; Interpersonal conflict    Interval history and content of current session: Pt arrived on time for second session with the undersigned. Patient reported upcoming change in employment with higher pay and more consistency with career goals. Explored history of invalidation and conflict with father. Processed feelings, including "hurt, betrayed, alone, and burdensome." Discussed current impact of emotional invalidation, including withholding vulnerable information and fear of not being heard by others. Also discussed relational dynamic that results in pt not getting needs met. Provided pt with [employer] handout for interpersonal effectiveness to complete. Agreed to complete handout at home and discuss in next session.    Mental Status Exam      Appearance: age appropriate, overweight  Grooming: appropriate to situation  Arousal: alert, awake  Behavior/Cooperation: friendly and cooperative  Speech: normal tone, normal rate, normal pitch, normal volume  Language: appropriate english vocabulary  Mood: euthymic  Affect: congruent with content of speech  Thought Process: normal and logical  Thought Content: normal, no suicidality, no homicidality, delusions, or paranoia  Associations: no loose associations noted  Orientation: grossly intact  Memory: Grossly intact  Fund of Knowledge: appropriate for education level  Attention Span/Concentration: Normal  Cognition: grossly intact  Insight: fair  Judgment: fair    Treatment plan:  · Target symptoms: anxiety , adjustment, interpersonal conflict  · Why chosen therapy is appropriate versus another modality: relevant to diagnosis, patient responds to this modality, evidence based practice  · Outcome monitoring methods: " self-report  · Therapeutic intervention type: insight oriented psychotherapy, behavior modifying psychotherapy    Risk parameters:  Patient reports no suicidal ideation  Patient reports no homicidal ideation  Patient reports no self-injurious behavior  Patient reports no violent behavior    Verbal deficits: None    Patient's response to intervention:  The patient's response to intervention is accepting.    Progress toward goals and other mental status changes:  The patient's progress toward goals is good.    Diagnosis:   Adjustment disorder with anxiety    Plan:  Continue in individual therapy. Will clarify relational values, review handout homework, and introduce progressive muscle relaxation in next session.    Return to clinic: 1 week    Length of Service (minutes): 60

## 2020-12-10 ENCOUNTER — OFFICE VISIT (OUTPATIENT)
Dept: PSYCHIATRY | Facility: CLINIC | Age: 25
End: 2020-12-10
Payer: COMMERCIAL

## 2020-12-10 DIAGNOSIS — F43.22 ADJUSTMENT DISORDER WITH ANXIETY: Primary | ICD-10-CM

## 2020-12-10 PROCEDURE — 90837 PR PSYCHOTHERAPY W/PATIENT, 60 MIN: ICD-10-PCS | Mod: S$GLB,,, | Performed by: PSYCHOLOGIST

## 2020-12-10 PROCEDURE — 90837 PSYTX W PT 60 MINUTES: CPT | Mod: S$GLB,,, | Performed by: PSYCHOLOGIST

## 2020-12-10 PROCEDURE — 99999 PR PBB SHADOW E&M-EST. PATIENT-LVL I: ICD-10-PCS | Mod: PBBFAC,,, | Performed by: PSYCHOLOGIST

## 2020-12-10 PROCEDURE — 99999 PR PBB SHADOW E&M-EST. PATIENT-LVL I: CPT | Mod: PBBFAC,,, | Performed by: PSYCHOLOGIST

## 2020-12-10 NOTE — PROGRESS NOTES
Individual Psychotherapy (PhD)    12/10/2020    Site:  [facility]         Therapeutic Intervention: Met with patient.  Outpatient - Behavior modifying psychotherapy 60 min - CPT code 31149    Chief complaint/reason for encounter: Anxiety; Interpersonal conflict    Interval history and content of current session: Pt arrived on time for third session with the undersigned. Explored pt's unease about recent change in employment, and pt endorsed feelings of anxiety and fear. Pt reported significant improvement in relations with family. Discussed pt's completion of [employer] handout. Pt completed the handout following a minor altercation with her mother and following a discussion about work with her entire family. Provided feedback about ways to validate others' feelings better in communication and to describe situations with more objectivity. Pt reported that she found the handout and the undersigned's feedback helpful.     Mental Status Exam      Appearance: age appropriate, overweight  Grooming: appropriate to situation  Arousal: alert, awake  Behavior/Cooperation: friendly and cooperative  Speech: normal tone, normal rate, normal pitch, normal volume  Language: appropriate english vocabulary  Mood: euthymic  Affect: congruent with content of speech  Thought Process: normal and logical  Thought Content: normal, no suicidality, no homicidality, delusions, or paranoia  Associations: no loose associations noted  Orientation: grossly intact  Memory: Grossly intact  Fund of Knowledge: appropriate for education level  Attention Span/Concentration: Normal  Cognition: grossly intact  Insight: fair  Judgment: fair    Treatment plan:  · Target symptoms: anxiety , adjustment, interpersonal conflict  · Why chosen therapy is appropriate versus another modality: relevant to diagnosis, patient responds to this modality, evidence based practice  · Outcome monitoring methods: self-report  · Therapeutic intervention type: insight  oriented psychotherapy, behavior modifying psychotherapy    Risk parameters:  Patient reports no suicidal ideation  Patient reports no homicidal ideation  Patient reports no self-injurious behavior  Patient reports no violent behavior    Verbal deficits: None    Patient's response to intervention:  The patient's response to intervention is accepting.    Progress toward goals and other mental status changes:  The patient's progress toward goals is good.    Diagnosis:   Adjustment disorder with anxiety    Plan:  Continue in individual therapy. Will clarify relational values and introduce progressive muscle relaxation in next session.    Return to clinic: 1 month (Pt will be on vacation next week and next available time for pt's schedule is after the new year.)    Length of Service (minutes): 60

## 2020-12-11 ENCOUNTER — HOSPITAL ENCOUNTER (OUTPATIENT)
Dept: RADIOLOGY | Facility: HOSPITAL | Age: 25
Discharge: HOME | End: 2020-12-11
Attending: PSYCHIATRY & NEUROLOGY
Payer: COMMERCIAL

## 2020-12-11 DIAGNOSIS — G40.309 GENERALIZED EPILEPSY: ICD-10-CM

## 2020-12-11 PROCEDURE — 70551 MRI BRAIN EPILEPSY WITHOUT CONTRAST: ICD-10-PCS | Mod: 26,,, | Performed by: RADIOLOGY

## 2020-12-11 PROCEDURE — 70551 MRI BRAIN STEM W/O DYE: CPT | Mod: 26,,, | Performed by: RADIOLOGY

## 2020-12-11 PROCEDURE — 70551 MRI BRAIN STEM W/O DYE: CPT | Mod: TC,PO

## 2021-01-07 ENCOUNTER — OFFICE VISIT (OUTPATIENT)
Dept: PSYCHIATRY | Facility: CLINIC | Age: 26
End: 2021-01-07
Payer: COMMERCIAL

## 2021-01-07 DIAGNOSIS — F43.22 ADJUSTMENT DISORDER WITH ANXIETY: Primary | ICD-10-CM

## 2021-01-07 PROCEDURE — 90837 PR PSYCHOTHERAPY W/PATIENT, 60 MIN: ICD-10-PCS | Mod: S$GLB,,, | Performed by: PSYCHOLOGIST

## 2021-01-07 PROCEDURE — 99999 PR PBB SHADOW E&M-EST. PATIENT-LVL I: CPT | Mod: PBBFAC,,, | Performed by: PSYCHOLOGIST

## 2021-01-07 PROCEDURE — 99999 PR PBB SHADOW E&M-EST. PATIENT-LVL I: ICD-10-PCS | Mod: PBBFAC,,, | Performed by: PSYCHOLOGIST

## 2021-01-07 PROCEDURE — 90837 PSYTX W PT 60 MINUTES: CPT | Mod: S$GLB,,, | Performed by: PSYCHOLOGIST

## 2021-01-08 ENCOUNTER — PATIENT MESSAGE (OUTPATIENT)
Dept: NEUROLOGY | Facility: CLINIC | Age: 26
End: 2021-01-08

## 2021-01-08 DIAGNOSIS — G40.909 NONINTRACTABLE EPILEPSY WITHOUT STATUS EPILEPTICUS, UNSPECIFIED EPILEPSY TYPE: Primary | ICD-10-CM

## 2021-01-08 RX ORDER — LAMOTRIGINE 25 MG/1
25 TABLET ORAL 2 TIMES DAILY
Qty: 60 TABLET | Refills: 11 | Status: SHIPPED | OUTPATIENT
Start: 2021-01-08 | End: 2021-06-01

## 2021-01-19 ENCOUNTER — OFFICE VISIT (OUTPATIENT)
Dept: FAMILY MEDICINE | Facility: CLINIC | Age: 26
End: 2021-01-19
Payer: COMMERCIAL

## 2021-01-19 ENCOUNTER — LAB VISIT (OUTPATIENT)
Dept: LAB | Facility: HOSPITAL | Age: 26
End: 2021-01-19
Attending: INTERNAL MEDICINE
Payer: COMMERCIAL

## 2021-01-19 VITALS
HEIGHT: 61 IN | HEART RATE: 108 BPM | SYSTOLIC BLOOD PRESSURE: 122 MMHG | OXYGEN SATURATION: 99 % | WEIGHT: 267.63 LBS | BODY MASS INDEX: 50.53 KG/M2 | DIASTOLIC BLOOD PRESSURE: 62 MMHG

## 2021-01-19 DIAGNOSIS — R10.11 RUQ ABDOMINAL PAIN: ICD-10-CM

## 2021-01-19 DIAGNOSIS — R16.0 HEPATOMEGALY: ICD-10-CM

## 2021-01-19 DIAGNOSIS — R10.9 ACUTE RIGHT FLANK PAIN: ICD-10-CM

## 2021-01-19 DIAGNOSIS — K76.0 FATTY LIVER: ICD-10-CM

## 2021-01-19 DIAGNOSIS — E78.49 OTHER HYPERLIPIDEMIA: ICD-10-CM

## 2021-01-19 DIAGNOSIS — R93.5 ABNORMAL US (ULTRASOUND) OF ABDOMEN: ICD-10-CM

## 2021-01-19 DIAGNOSIS — R10.9 ACUTE RIGHT FLANK PAIN: Primary | ICD-10-CM

## 2021-01-19 DIAGNOSIS — R10.11 RIGHT UPPER QUADRANT ABDOMINAL PAIN: ICD-10-CM

## 2021-01-19 DIAGNOSIS — K80.20 CALCULUS OF GALLBLADDER WITHOUT CHOLECYSTITIS WITHOUT OBSTRUCTION: ICD-10-CM

## 2021-01-19 DIAGNOSIS — Z83.79 FAMILY HISTORY OF GALLSTONES: ICD-10-CM

## 2021-01-19 DIAGNOSIS — E66.01 MORBID OBESITY: ICD-10-CM

## 2021-01-19 PROCEDURE — 3008F PR BODY MASS INDEX (BMI) DOCUMENTED: ICD-10-PCS | Mod: CPTII,S$GLB,, | Performed by: INTERNAL MEDICINE

## 2021-01-19 PROCEDURE — 81001 URINALYSIS AUTO W/SCOPE: CPT

## 2021-01-19 PROCEDURE — 99999 PR PBB SHADOW E&M-EST. PATIENT-LVL IV: CPT | Mod: PBBFAC,,, | Performed by: INTERNAL MEDICINE

## 2021-01-19 PROCEDURE — 99214 PR OFFICE/OUTPT VISIT, EST, LEVL IV, 30-39 MIN: ICD-10-PCS | Mod: S$GLB,,, | Performed by: INTERNAL MEDICINE

## 2021-01-19 PROCEDURE — 99999 PR PBB SHADOW E&M-EST. PATIENT-LVL IV: ICD-10-PCS | Mod: PBBFAC,,, | Performed by: INTERNAL MEDICINE

## 2021-01-19 PROCEDURE — 99214 OFFICE O/P EST MOD 30 MIN: CPT | Mod: S$GLB,,, | Performed by: INTERNAL MEDICINE

## 2021-01-19 PROCEDURE — 3008F BODY MASS INDEX DOCD: CPT | Mod: CPTII,S$GLB,, | Performed by: INTERNAL MEDICINE

## 2021-01-20 LAB
BACTERIA #/AREA URNS AUTO: ABNORMAL /HPF
BILIRUB UR QL STRIP: NEGATIVE
CLARITY UR REFRACT.AUTO: ABNORMAL
COLOR UR AUTO: YELLOW
GLUCOSE UR QL STRIP: NEGATIVE
HGB UR QL STRIP: NEGATIVE
KETONES UR QL STRIP: NEGATIVE
LEUKOCYTE ESTERASE UR QL STRIP: NEGATIVE
MICROSCOPIC COMMENT: ABNORMAL
NITRITE UR QL STRIP: NEGATIVE
PH UR STRIP: 6 [PH] (ref 5–8)
PROT UR QL STRIP: NEGATIVE
RBC #/AREA URNS AUTO: 2 /HPF (ref 0–4)
SP GR UR STRIP: 1.01 (ref 1–1.03)
SQUAMOUS #/AREA URNS AUTO: 4 /HPF
URN SPEC COLLECT METH UR: ABNORMAL
WBC #/AREA URNS AUTO: 6 /HPF (ref 0–5)

## 2021-01-22 ENCOUNTER — LAB VISIT (OUTPATIENT)
Dept: LAB | Facility: HOSPITAL | Age: 26
End: 2021-01-22
Attending: INTERNAL MEDICINE
Payer: COMMERCIAL

## 2021-01-22 DIAGNOSIS — R16.0 HEPATOMEGALY: ICD-10-CM

## 2021-01-22 DIAGNOSIS — E66.01 MORBID OBESITY: ICD-10-CM

## 2021-01-22 DIAGNOSIS — R10.11 RUQ ABDOMINAL PAIN: ICD-10-CM

## 2021-01-22 DIAGNOSIS — E78.49 OTHER HYPERLIPIDEMIA: ICD-10-CM

## 2021-01-22 DIAGNOSIS — R93.5 ABNORMAL US (ULTRASOUND) OF ABDOMEN: ICD-10-CM

## 2021-01-22 DIAGNOSIS — R10.9 ACUTE RIGHT FLANK PAIN: ICD-10-CM

## 2021-01-22 LAB
ALBUMIN SERPL BCP-MCNC: 3.5 G/DL (ref 3.5–5.2)
ALP SERPL-CCNC: 66 U/L (ref 55–135)
ALT SERPL W/O P-5'-P-CCNC: 21 U/L (ref 10–44)
AMYLASE SERPL-CCNC: 61 U/L (ref 20–110)
ANION GAP SERPL CALC-SCNC: 11 MMOL/L (ref 8–16)
AST SERPL-CCNC: 18 U/L (ref 10–40)
BASOPHILS # BLD AUTO: 0.05 K/UL (ref 0–0.2)
BASOPHILS NFR BLD: 0.6 % (ref 0–1.9)
BILIRUB SERPL-MCNC: 0.3 MG/DL (ref 0.1–1)
BUN SERPL-MCNC: 11 MG/DL (ref 6–20)
CALCIUM SERPL-MCNC: 9.1 MG/DL (ref 8.7–10.5)
CHLORIDE SERPL-SCNC: 105 MMOL/L (ref 95–110)
CHOLEST SERPL-MCNC: 219 MG/DL (ref 120–199)
CHOLEST/HDLC SERPL: 4.1 {RATIO} (ref 2–5)
CO2 SERPL-SCNC: 24 MMOL/L (ref 23–29)
CREAT SERPL-MCNC: 0.8 MG/DL (ref 0.5–1.4)
DIFFERENTIAL METHOD: ABNORMAL
EOSINOPHIL # BLD AUTO: 0.1 K/UL (ref 0–0.5)
EOSINOPHIL NFR BLD: 1.4 % (ref 0–8)
ERYTHROCYTE [DISTWIDTH] IN BLOOD BY AUTOMATED COUNT: 13.7 % (ref 11.5–14.5)
EST. GFR  (AFRICAN AMERICAN): >60 ML/MIN/1.73 M^2
EST. GFR  (NON AFRICAN AMERICAN): >60 ML/MIN/1.73 M^2
ESTIMATED AVG GLUCOSE: 88 MG/DL (ref 68–131)
GLUCOSE SERPL-MCNC: 78 MG/DL (ref 70–110)
HBA1C MFR BLD HPLC: 4.7 % (ref 4–5.6)
HCT VFR BLD AUTO: 39.6 % (ref 37–48.5)
HDLC SERPL-MCNC: 53 MG/DL (ref 40–75)
HDLC SERPL: 24.2 % (ref 20–50)
HGB BLD-MCNC: 12.2 G/DL (ref 12–16)
IMM GRANULOCYTES # BLD AUTO: 0.02 K/UL (ref 0–0.04)
IMM GRANULOCYTES NFR BLD AUTO: 0.2 % (ref 0–0.5)
LDLC SERPL CALC-MCNC: 134 MG/DL (ref 63–159)
LIPASE SERPL-CCNC: 37 U/L (ref 4–60)
LYMPHOCYTES # BLD AUTO: 2.6 K/UL (ref 1–4.8)
LYMPHOCYTES NFR BLD: 31.7 % (ref 18–48)
MCH RBC QN AUTO: 28.8 PG (ref 27–31)
MCHC RBC AUTO-ENTMCNC: 30.8 G/DL (ref 32–36)
MCV RBC AUTO: 94 FL (ref 82–98)
MONOCYTES # BLD AUTO: 0.4 K/UL (ref 0.3–1)
MONOCYTES NFR BLD: 4.3 % (ref 4–15)
NEUTROPHILS # BLD AUTO: 5.1 K/UL (ref 1.8–7.7)
NEUTROPHILS NFR BLD: 61.8 % (ref 38–73)
NONHDLC SERPL-MCNC: 166 MG/DL
NRBC BLD-RTO: 0 /100 WBC
PLATELET # BLD AUTO: 316 K/UL (ref 150–350)
PMV BLD AUTO: 11.4 FL (ref 9.2–12.9)
POTASSIUM SERPL-SCNC: 4.2 MMOL/L (ref 3.5–5.1)
PROT SERPL-MCNC: 7.1 G/DL (ref 6–8.4)
RBC # BLD AUTO: 4.23 M/UL (ref 4–5.4)
SODIUM SERPL-SCNC: 140 MMOL/L (ref 136–145)
TRIGL SERPL-MCNC: 160 MG/DL (ref 30–150)
TSH SERPL DL<=0.005 MIU/L-ACNC: 1.12 UIU/ML (ref 0.4–4)
WBC # BLD AUTO: 8.32 K/UL (ref 3.9–12.7)

## 2021-01-22 PROCEDURE — 36415 COLL VENOUS BLD VENIPUNCTURE: CPT | Mod: PN

## 2021-01-22 PROCEDURE — 80061 LIPID PANEL: CPT

## 2021-01-22 PROCEDURE — 85025 COMPLETE CBC W/AUTO DIFF WBC: CPT

## 2021-01-22 PROCEDURE — 83036 HEMOGLOBIN GLYCOSYLATED A1C: CPT

## 2021-01-22 PROCEDURE — 82150 ASSAY OF AMYLASE: CPT

## 2021-01-22 PROCEDURE — 83690 ASSAY OF LIPASE: CPT

## 2021-01-22 PROCEDURE — 84443 ASSAY THYROID STIM HORMONE: CPT

## 2021-01-22 PROCEDURE — 80053 COMPREHEN METABOLIC PANEL: CPT

## 2021-01-29 ENCOUNTER — HOSPITAL ENCOUNTER (OUTPATIENT)
Dept: RADIOLOGY | Facility: HOSPITAL | Age: 26
Discharge: HOME | End: 2021-01-29
Attending: INTERNAL MEDICINE
Payer: COMMERCIAL

## 2021-01-29 DIAGNOSIS — R10.11 RUQ ABDOMINAL PAIN: ICD-10-CM

## 2021-01-29 DIAGNOSIS — R16.0 HEPATOMEGALY: ICD-10-CM

## 2021-01-29 DIAGNOSIS — R10.11 RIGHT UPPER QUADRANT ABDOMINAL PAIN: ICD-10-CM

## 2021-01-29 DIAGNOSIS — K80.20 CALCULUS OF GALLBLADDER WITHOUT CHOLECYSTITIS WITHOUT OBSTRUCTION: ICD-10-CM

## 2021-01-29 DIAGNOSIS — R93.5 ABNORMAL US (ULTRASOUND) OF ABDOMEN: ICD-10-CM

## 2021-01-29 DIAGNOSIS — R10.9 ACUTE RIGHT FLANK PAIN: ICD-10-CM

## 2021-01-29 DIAGNOSIS — E66.01 MORBID OBESITY: ICD-10-CM

## 2021-01-29 PROCEDURE — 74176 CT RENAL STONE STUDY ABD PELVIS WO: ICD-10-PCS | Mod: 26,,, | Performed by: RADIOLOGY

## 2021-01-29 PROCEDURE — 76700 US EXAM ABDOM COMPLETE: CPT | Mod: 26,,, | Performed by: RADIOLOGY

## 2021-01-29 PROCEDURE — 76700 US EXAM ABDOM COMPLETE: CPT | Mod: TC,PO

## 2021-01-29 PROCEDURE — 74176 CT ABD & PELVIS W/O CONTRAST: CPT | Mod: TC,PO

## 2021-01-29 PROCEDURE — 74176 CT ABD & PELVIS W/O CONTRAST: CPT | Mod: 26,,, | Performed by: RADIOLOGY

## 2021-01-29 PROCEDURE — 76700 US ABDOMEN COMPLETE: ICD-10-PCS | Mod: 26,,, | Performed by: RADIOLOGY

## 2021-01-31 ENCOUNTER — TELEPHONE (OUTPATIENT)
Dept: FAMILY MEDICINE | Facility: CLINIC | Age: 26
End: 2021-01-31

## 2021-02-02 ENCOUNTER — OFFICE VISIT (OUTPATIENT)
Dept: FAMILY MEDICINE | Facility: CLINIC | Age: 26
End: 2021-02-02
Payer: COMMERCIAL

## 2021-02-02 VITALS
OXYGEN SATURATION: 98 % | SYSTOLIC BLOOD PRESSURE: 118 MMHG | HEIGHT: 61 IN | WEIGHT: 269.63 LBS | DIASTOLIC BLOOD PRESSURE: 74 MMHG | BODY MASS INDEX: 50.91 KG/M2 | HEART RATE: 112 BPM

## 2021-02-02 DIAGNOSIS — Z01.89 ENCOUNTER FOR LABORATORY TEST: ICD-10-CM

## 2021-02-02 DIAGNOSIS — E78.2 MIXED HYPERLIPIDEMIA: ICD-10-CM

## 2021-02-02 DIAGNOSIS — K59.09 CHRONIC CONSTIPATION WITH OVERFLOW: ICD-10-CM

## 2021-02-02 DIAGNOSIS — R10.9 RIGHT FLANK PAIN: Primary | ICD-10-CM

## 2021-02-02 DIAGNOSIS — R16.0 HEPATOMEGALY: ICD-10-CM

## 2021-02-02 DIAGNOSIS — E66.01 MORBID OBESITY: ICD-10-CM

## 2021-02-02 DIAGNOSIS — K76.0 FATTY LIVER: ICD-10-CM

## 2021-02-02 DIAGNOSIS — K80.20 CALCULUS OF GALLBLADDER WITHOUT CHOLECYSTITIS WITHOUT OBSTRUCTION: ICD-10-CM

## 2021-02-02 DIAGNOSIS — K56.49 IMPACTION OF THE BOWELS: ICD-10-CM

## 2021-02-02 PROCEDURE — 3008F PR BODY MASS INDEX (BMI) DOCUMENTED: ICD-10-PCS | Mod: CPTII,S$GLB,, | Performed by: INTERNAL MEDICINE

## 2021-02-02 PROCEDURE — 99999 PR PBB SHADOW E&M-EST. PATIENT-LVL IV: CPT | Mod: PBBFAC,,, | Performed by: INTERNAL MEDICINE

## 2021-02-02 PROCEDURE — 99214 OFFICE O/P EST MOD 30 MIN: CPT | Mod: S$GLB,,, | Performed by: INTERNAL MEDICINE

## 2021-02-02 PROCEDURE — 99214 PR OFFICE/OUTPT VISIT, EST, LEVL IV, 30-39 MIN: ICD-10-PCS | Mod: S$GLB,,, | Performed by: INTERNAL MEDICINE

## 2021-02-02 PROCEDURE — 3008F BODY MASS INDEX DOCD: CPT | Mod: CPTII,S$GLB,, | Performed by: INTERNAL MEDICINE

## 2021-02-02 PROCEDURE — 99999 PR PBB SHADOW E&M-EST. PATIENT-LVL IV: ICD-10-PCS | Mod: PBBFAC,,, | Performed by: INTERNAL MEDICINE

## 2021-02-04 ENCOUNTER — OFFICE VISIT (OUTPATIENT)
Dept: PSYCHIATRY | Facility: CLINIC | Age: 26
End: 2021-02-04
Payer: COMMERCIAL

## 2021-02-04 DIAGNOSIS — F43.22 ADJUSTMENT DISORDER WITH ANXIETY: Primary | ICD-10-CM

## 2021-02-04 PROCEDURE — 99999 PR PBB SHADOW E&M-EST. PATIENT-LVL I: CPT | Mod: PBBFAC,,, | Performed by: PSYCHOLOGIST

## 2021-02-04 PROCEDURE — 90837 PSYTX W PT 60 MINUTES: CPT | Mod: S$GLB,,, | Performed by: PSYCHOLOGIST

## 2021-02-04 PROCEDURE — 99999 PR PBB SHADOW E&M-EST. PATIENT-LVL I: ICD-10-PCS | Mod: PBBFAC,,, | Performed by: PSYCHOLOGIST

## 2021-02-04 PROCEDURE — 90837 PR PSYCHOTHERAPY W/PATIENT, 60 MIN: ICD-10-PCS | Mod: S$GLB,,, | Performed by: PSYCHOLOGIST

## 2021-02-12 ENCOUNTER — PATIENT MESSAGE (OUTPATIENT)
Dept: BARIATRICS | Facility: CLINIC | Age: 26
End: 2021-02-12

## 2021-03-04 ENCOUNTER — DOCUMENTATION ONLY (OUTPATIENT)
Dept: PSYCHIATRY | Facility: CLINIC | Age: 26
End: 2021-03-04

## 2021-03-04 ENCOUNTER — PATIENT MESSAGE (OUTPATIENT)
Dept: PSYCHIATRY | Facility: CLINIC | Age: 26
End: 2021-03-04

## 2021-05-25 ENCOUNTER — TELEPHONE (OUTPATIENT)
Dept: NEUROLOGY | Facility: CLINIC | Age: 26
End: 2021-05-25

## 2021-05-25 DIAGNOSIS — G40.909 NONINTRACTABLE EPILEPSY WITHOUT STATUS EPILEPTICUS, UNSPECIFIED EPILEPSY TYPE: Primary | ICD-10-CM

## 2021-05-26 ENCOUNTER — PATIENT MESSAGE (OUTPATIENT)
Dept: NEUROLOGY | Facility: CLINIC | Age: 26
End: 2021-05-26

## 2021-05-28 ENCOUNTER — LAB VISIT (OUTPATIENT)
Dept: LAB | Facility: HOSPITAL | Age: 26
End: 2021-05-28
Attending: PSYCHIATRY & NEUROLOGY
Payer: COMMERCIAL

## 2021-05-28 DIAGNOSIS — G40.909 NONINTRACTABLE EPILEPSY WITHOUT STATUS EPILEPTICUS, UNSPECIFIED EPILEPSY TYPE: ICD-10-CM

## 2021-05-28 PROCEDURE — 36415 COLL VENOUS BLD VENIPUNCTURE: CPT | Mod: PN | Performed by: PSYCHIATRY & NEUROLOGY

## 2021-05-28 PROCEDURE — 80175 DRUG SCREEN QUAN LAMOTRIGINE: CPT | Performed by: PSYCHIATRY & NEUROLOGY

## 2021-06-01 ENCOUNTER — LAB VISIT (OUTPATIENT)
Dept: LAB | Facility: HOSPITAL | Age: 26
End: 2021-06-01
Attending: FAMILY MEDICINE
Payer: COMMERCIAL

## 2021-06-01 ENCOUNTER — OFFICE VISIT (OUTPATIENT)
Dept: FAMILY MEDICINE | Facility: CLINIC | Age: 26
End: 2021-06-01
Payer: COMMERCIAL

## 2021-06-01 ENCOUNTER — PATIENT MESSAGE (OUTPATIENT)
Dept: NEUROLOGY | Facility: HOSPITAL | Age: 26
End: 2021-06-01

## 2021-06-01 ENCOUNTER — PATIENT MESSAGE (OUTPATIENT)
Dept: NEUROLOGY | Facility: CLINIC | Age: 26
End: 2021-06-01

## 2021-06-01 VITALS
HEIGHT: 61 IN | SYSTOLIC BLOOD PRESSURE: 126 MMHG | HEART RATE: 84 BPM | WEIGHT: 270.63 LBS | TEMPERATURE: 98 F | BODY MASS INDEX: 51.1 KG/M2 | DIASTOLIC BLOOD PRESSURE: 76 MMHG | RESPIRATION RATE: 14 BRPM

## 2021-06-01 DIAGNOSIS — R55 SYNCOPE, UNSPECIFIED SYNCOPE TYPE: Primary | ICD-10-CM

## 2021-06-01 DIAGNOSIS — Z00.00 ROUTINE HEALTH MAINTENANCE: ICD-10-CM

## 2021-06-01 DIAGNOSIS — E28.2 PCOS (POLYCYSTIC OVARIAN SYNDROME): ICD-10-CM

## 2021-06-01 DIAGNOSIS — R55 SYNCOPE, UNSPECIFIED SYNCOPE TYPE: ICD-10-CM

## 2021-06-01 DIAGNOSIS — G40.909 NONINTRACTABLE EPILEPSY WITHOUT STATUS EPILEPTICUS, UNSPECIFIED EPILEPSY TYPE: ICD-10-CM

## 2021-06-01 DIAGNOSIS — G40.909 EPILEPSY WITHOUT STATUS EPILEPTICUS, NOT INTRACTABLE: ICD-10-CM

## 2021-06-01 LAB
25(OH)D3+25(OH)D2 SERPL-MCNC: 28 NG/ML (ref 30–96)
ALBUMIN SERPL BCP-MCNC: 3.7 G/DL (ref 3.5–5.2)
ALP SERPL-CCNC: 76 U/L (ref 55–135)
ALT SERPL W/O P-5'-P-CCNC: 27 U/L (ref 10–44)
ANION GAP SERPL CALC-SCNC: 13 MMOL/L (ref 8–16)
AST SERPL-CCNC: 20 U/L (ref 10–40)
B-HCG UR QL: NEGATIVE
BILIRUB SERPL-MCNC: 0.4 MG/DL (ref 0.1–1)
BUN SERPL-MCNC: 9 MG/DL (ref 6–20)
CALCIUM SERPL-MCNC: 9.6 MG/DL (ref 8.7–10.5)
CHLORIDE SERPL-SCNC: 107 MMOL/L (ref 95–110)
CO2 SERPL-SCNC: 20 MMOL/L (ref 23–29)
CREAT SERPL-MCNC: 0.9 MG/DL (ref 0.5–1.4)
CTP QC/QA: YES
ERYTHROCYTE [DISTWIDTH] IN BLOOD BY AUTOMATED COUNT: 13.9 % (ref 11.5–14.5)
EST. GFR  (AFRICAN AMERICAN): >60 ML/MIN/1.73 M^2
EST. GFR  (NON AFRICAN AMERICAN): >60 ML/MIN/1.73 M^2
ESTIMATED AVG GLUCOSE: 94 MG/DL (ref 68–131)
ESTRADIOL SERPL-MCNC: 92 PG/ML
FSH SERPL-ACNC: 7.6 MIU/ML
GLUCOSE SERPL-MCNC: 73 MG/DL (ref 70–110)
HBA1C MFR BLD: 4.9 % (ref 4–5.6)
HCT VFR BLD AUTO: 40.1 % (ref 37–48.5)
HGB BLD-MCNC: 12.8 G/DL (ref 12–16)
INSULIN COLLECTION INTERVAL: NORMAL
INSULIN SERPL-ACNC: 13 UU/ML
IRON SERPL-MCNC: 91 UG/DL (ref 30–160)
LAMOTRIGINE SERPL-MCNC: 4.6 UG/ML (ref 2–15)
LH SERPL-ACNC: 8.6 MIU/ML
MAGNESIUM SERPL-MCNC: 1.9 MG/DL (ref 1.6–2.6)
MCH RBC QN AUTO: 28.4 PG (ref 27–31)
MCHC RBC AUTO-ENTMCNC: 31.9 G/DL (ref 32–36)
MCV RBC AUTO: 89 FL (ref 82–98)
PLATELET # BLD AUTO: 292 K/UL (ref 150–450)
PMV BLD AUTO: 10.8 FL (ref 9.2–12.9)
POTASSIUM SERPL-SCNC: 4.1 MMOL/L (ref 3.5–5.1)
PROT SERPL-MCNC: 7.6 G/DL (ref 6–8.4)
RBC # BLD AUTO: 4.51 M/UL (ref 4–5.4)
SODIUM SERPL-SCNC: 140 MMOL/L (ref 136–145)
T4 FREE SERPL-MCNC: 1.02 NG/DL (ref 0.71–1.51)
TSH SERPL DL<=0.005 MIU/L-ACNC: 1.21 UIU/ML (ref 0.4–4)
WBC # BLD AUTO: 8.68 K/UL (ref 3.9–12.7)

## 2021-06-01 PROCEDURE — 80053 COMPREHEN METABOLIC PANEL: CPT | Performed by: FAMILY MEDICINE

## 2021-06-01 PROCEDURE — 81025 POCT URINE PREGNANCY: ICD-10-PCS | Mod: S$GLB,,, | Performed by: FAMILY MEDICINE

## 2021-06-01 PROCEDURE — 1126F PR PAIN SEVERITY QUANTIFIED, NO PAIN PRESENT: ICD-10-PCS | Mod: S$GLB,,, | Performed by: FAMILY MEDICINE

## 2021-06-01 PROCEDURE — 83036 HEMOGLOBIN GLYCOSYLATED A1C: CPT | Performed by: FAMILY MEDICINE

## 2021-06-01 PROCEDURE — 83540 ASSAY OF IRON: CPT | Performed by: FAMILY MEDICINE

## 2021-06-01 PROCEDURE — 82306 VITAMIN D 25 HYDROXY: CPT | Performed by: FAMILY MEDICINE

## 2021-06-01 PROCEDURE — 93005 EKG 12-LEAD: ICD-10-PCS | Mod: S$GLB,,, | Performed by: FAMILY MEDICINE

## 2021-06-01 PROCEDURE — 82670 ASSAY OF TOTAL ESTRADIOL: CPT | Performed by: FAMILY MEDICINE

## 2021-06-01 PROCEDURE — 83735 ASSAY OF MAGNESIUM: CPT | Performed by: FAMILY MEDICINE

## 2021-06-01 PROCEDURE — 82607 VITAMIN B-12: CPT | Performed by: FAMILY MEDICINE

## 2021-06-01 PROCEDURE — 99214 PR OFFICE/OUTPT VISIT, EST, LEVL IV, 30-39 MIN: ICD-10-PCS | Mod: S$GLB,,, | Performed by: FAMILY MEDICINE

## 2021-06-01 PROCEDURE — 93010 ELECTROCARDIOGRAM REPORT: CPT | Mod: S$GLB,,, | Performed by: INTERNAL MEDICINE

## 2021-06-01 PROCEDURE — 3008F PR BODY MASS INDEX (BMI) DOCUMENTED: ICD-10-PCS | Mod: CPTII,S$GLB,, | Performed by: FAMILY MEDICINE

## 2021-06-01 PROCEDURE — 83002 ASSAY OF GONADOTROPIN (LH): CPT | Performed by: FAMILY MEDICINE

## 2021-06-01 PROCEDURE — 84439 ASSAY OF FREE THYROXINE: CPT | Performed by: FAMILY MEDICINE

## 2021-06-01 PROCEDURE — 99999 PR PBB SHADOW E&M-EST. PATIENT-LVL IV: CPT | Mod: PBBFAC,,, | Performed by: FAMILY MEDICINE

## 2021-06-01 PROCEDURE — 3008F BODY MASS INDEX DOCD: CPT | Mod: CPTII,S$GLB,, | Performed by: FAMILY MEDICINE

## 2021-06-01 PROCEDURE — 84443 ASSAY THYROID STIM HORMONE: CPT | Performed by: FAMILY MEDICINE

## 2021-06-01 PROCEDURE — 93010 EKG 12-LEAD: ICD-10-PCS | Mod: S$GLB,,, | Performed by: INTERNAL MEDICINE

## 2021-06-01 PROCEDURE — 93005 ELECTROCARDIOGRAM TRACING: CPT | Mod: S$GLB,,, | Performed by: FAMILY MEDICINE

## 2021-06-01 PROCEDURE — 85027 COMPLETE CBC AUTOMATED: CPT | Performed by: FAMILY MEDICINE

## 2021-06-01 PROCEDURE — 1126F AMNT PAIN NOTED NONE PRSNT: CPT | Mod: S$GLB,,, | Performed by: FAMILY MEDICINE

## 2021-06-01 PROCEDURE — 83001 ASSAY OF GONADOTROPIN (FSH): CPT | Performed by: FAMILY MEDICINE

## 2021-06-01 PROCEDURE — 99999 PR PBB SHADOW E&M-EST. PATIENT-LVL IV: ICD-10-PCS | Mod: PBBFAC,,, | Performed by: FAMILY MEDICINE

## 2021-06-01 PROCEDURE — 36415 COLL VENOUS BLD VENIPUNCTURE: CPT | Mod: PN | Performed by: FAMILY MEDICINE

## 2021-06-01 PROCEDURE — 99214 OFFICE O/P EST MOD 30 MIN: CPT | Mod: S$GLB,,, | Performed by: FAMILY MEDICINE

## 2021-06-01 PROCEDURE — 81025 URINE PREGNANCY TEST: CPT | Mod: S$GLB,,, | Performed by: FAMILY MEDICINE

## 2021-06-01 PROCEDURE — 83525 ASSAY OF INSULIN: CPT | Performed by: FAMILY MEDICINE

## 2021-06-01 RX ORDER — LAMOTRIGINE 200 MG/1
300 TABLET ORAL 2 TIMES DAILY
Qty: 90 TABLET | Refills: 11 | Status: SHIPPED | OUTPATIENT
Start: 2021-06-01 | End: 2022-02-01

## 2021-06-02 LAB — VIT B12 SERPL-MCNC: 343 PG/ML (ref 210–950)

## 2021-06-09 ENCOUNTER — TELEPHONE (OUTPATIENT)
Dept: NEUROLOGY | Facility: CLINIC | Age: 26
End: 2021-06-09

## 2021-06-10 ENCOUNTER — TELEPHONE (OUTPATIENT)
Dept: NEUROLOGY | Facility: CLINIC | Age: 26
End: 2021-06-10

## 2021-06-21 DIAGNOSIS — E28.2 PCOS (POLYCYSTIC OVARIAN SYNDROME): ICD-10-CM

## 2021-06-24 RX ORDER — METFORMIN HYDROCHLORIDE 500 MG/1
1000 TABLET, EXTENDED RELEASE ORAL
Qty: 180 TABLET | Refills: 1 | Status: SHIPPED | OUTPATIENT
Start: 2021-06-24 | End: 2022-05-26

## 2021-07-02 ENCOUNTER — HOSPITAL ENCOUNTER (OUTPATIENT)
Dept: CARDIOLOGY | Facility: HOSPITAL | Age: 26
Discharge: HOME | End: 2021-07-02
Attending: FAMILY MEDICINE
Payer: COMMERCIAL

## 2021-07-02 VITALS — WEIGHT: 270 LBS | BODY MASS INDEX: 50.98 KG/M2 | HEIGHT: 61 IN | HEART RATE: 110 BPM

## 2021-07-02 DIAGNOSIS — Z00.00 ROUTINE HEALTH MAINTENANCE: ICD-10-CM

## 2021-07-02 DIAGNOSIS — R55 SYNCOPE, UNSPECIFIED SYNCOPE TYPE: ICD-10-CM

## 2021-07-02 PROCEDURE — 93306 ECHO (CUPID ONLY): ICD-10-PCS | Mod: 26,,, | Performed by: INTERNAL MEDICINE

## 2021-07-02 PROCEDURE — 93306 TTE W/DOPPLER COMPLETE: CPT | Mod: 26,,, | Performed by: INTERNAL MEDICINE

## 2021-07-02 PROCEDURE — 93306 TTE W/DOPPLER COMPLETE: CPT | Mod: PO

## 2021-07-05 LAB
ASCENDING AORTA: 2.23 CM
AV INDEX (PROSTH): 1
AV MEAN GRADIENT: 3 MMHG
AV PEAK GRADIENT: 6 MMHG
AV VALVE AREA: 3.09 CM2
AV VELOCITY RATIO: 0.81
BSA FOR ECHO PROCEDURE: 2.3 M2
CV ECHO LV RWT: 0.48 CM
DOP CALC AO PEAK VEL: 1.26 M/S
DOP CALC AO VTI: 22.67 CM
DOP CALC LVOT AREA: 3.1 CM2
DOP CALC LVOT DIAMETER: 1.98 CM
DOP CALC LVOT PEAK VEL: 1.02 M/S
DOP CALC LVOT STROKE VOLUME: 70.01 CM3
DOP CALCLVOT PEAK VEL VTI: 22.75 CM
E WAVE DECELERATION TIME: 138.77 MSEC
E/A RATIO: 0.47
E/E' RATIO: 3.33 M/S
ECHO LV POSTERIOR WALL: 1.07 CM (ref 0.6–1.1)
EJECTION FRACTION: 60 %
FRACTIONAL SHORTENING: 25 % (ref 28–44)
INTERVENTRICULAR SEPTUM: 1.04 CM (ref 0.6–1.1)
IVRT: 105.61 MSEC
LA MAJOR: 4.02 CM
LA MINOR: 3.96 CM
LA WIDTH: 3.34 CM
LEFT ATRIUM SIZE: 3 CM
LEFT ATRIUM VOLUME INDEX: 15.8 ML/M2
LEFT ATRIUM VOLUME: 33.98 CM3
LEFT INTERNAL DIMENSION IN SYSTOLE: 3.32 CM (ref 2.1–4)
LEFT VENTRICLE DIASTOLIC VOLUME INDEX: 41.16 ML/M2
LEFT VENTRICLE DIASTOLIC VOLUME: 88.5 ML
LEFT VENTRICLE MASS INDEX: 75 G/M2
LEFT VENTRICLE SYSTOLIC VOLUME INDEX: 20.9 ML/M2
LEFT VENTRICLE SYSTOLIC VOLUME: 44.87 ML
LEFT VENTRICULAR INTERNAL DIMENSION IN DIASTOLE: 4.42 CM (ref 3.5–6)
LEFT VENTRICULAR MASS: 160.42 G
LV LATERAL E/E' RATIO: 2.92 M/S
LV SEPTAL E/E' RATIO: 3.89 M/S
MV A" WAVE DURATION": 13.42 MSEC
MV PEAK A VEL: 0.75 M/S
MV PEAK E VEL: 0.35 M/S
MV STENOSIS PRESSURE HALF TIME: 40.24 MS
MV VALVE AREA P 1/2 METHOD: 5.47 CM2
PISA TR MAX VEL: 2.27 M/S
PULM VEIN S/D RATIO: 0.96
PV PEAK D VEL: 0.51 M/S
PV PEAK S VEL: 0.49 M/S
RA MAJOR: 4.05 CM
RA PRESSURE: 3 MMHG
RA WIDTH: 2.48 CM
RIGHT VENTRICULAR END-DIASTOLIC DIMENSION: 3.31 CM
RV TISSUE DOPPLER FREE WALL SYSTOLIC VELOCITY 1 (APICAL 4 CHAMBER VIEW): 14.96 CM/S
SINUS: 2.38 CM
STJ: 2.21 CM
TDI LATERAL: 0.12 M/S
TDI SEPTAL: 0.09 M/S
TDI: 0.11 M/S
TR MAX PG: 21 MMHG
TRICUSPID ANNULAR PLANE SYSTOLIC EXCURSION: 1.68 CM
TV REST PULMONARY ARTERY PRESSURE: 24 MMHG

## 2021-07-07 ENCOUNTER — PATIENT OUTREACH (OUTPATIENT)
Dept: ADMINISTRATIVE | Facility: OTHER | Age: 26
End: 2021-07-07

## 2021-07-08 ENCOUNTER — TELEPHONE (OUTPATIENT)
Dept: NEUROLOGY | Facility: CLINIC | Age: 26
End: 2021-07-08

## 2021-07-08 ENCOUNTER — OFFICE VISIT (OUTPATIENT)
Dept: NEUROLOGY | Facility: CLINIC | Age: 26
End: 2021-07-08
Payer: COMMERCIAL

## 2021-07-08 VITALS
TEMPERATURE: 98 F | RESPIRATION RATE: 20 BRPM | HEART RATE: 104 BPM | DIASTOLIC BLOOD PRESSURE: 70 MMHG | BODY MASS INDEX: 50.94 KG/M2 | SYSTOLIC BLOOD PRESSURE: 111 MMHG | HEIGHT: 61 IN | WEIGHT: 269.81 LBS

## 2021-07-08 DIAGNOSIS — G40.909 NONINTRACTABLE EPILEPSY WITHOUT STATUS EPILEPTICUS, UNSPECIFIED EPILEPSY TYPE: Primary | ICD-10-CM

## 2021-07-08 PROCEDURE — 99999 PR PBB SHADOW E&M-EST. PATIENT-LVL V: CPT | Mod: PBBFAC,,, | Performed by: PSYCHIATRY & NEUROLOGY

## 2021-07-08 PROCEDURE — 3008F PR BODY MASS INDEX (BMI) DOCUMENTED: ICD-10-PCS | Mod: CPTII,S$GLB,, | Performed by: PSYCHIATRY & NEUROLOGY

## 2021-07-08 PROCEDURE — 99214 OFFICE O/P EST MOD 30 MIN: CPT | Mod: S$GLB,,, | Performed by: PSYCHIATRY & NEUROLOGY

## 2021-07-08 PROCEDURE — 1126F AMNT PAIN NOTED NONE PRSNT: CPT | Mod: S$GLB,,, | Performed by: PSYCHIATRY & NEUROLOGY

## 2021-07-08 PROCEDURE — 1126F PR PAIN SEVERITY QUANTIFIED, NO PAIN PRESENT: ICD-10-PCS | Mod: S$GLB,,, | Performed by: PSYCHIATRY & NEUROLOGY

## 2021-07-08 PROCEDURE — 99214 PR OFFICE/OUTPT VISIT, EST, LEVL IV, 30-39 MIN: ICD-10-PCS | Mod: S$GLB,,, | Performed by: PSYCHIATRY & NEUROLOGY

## 2021-07-08 PROCEDURE — 99999 PR PBB SHADOW E&M-EST. PATIENT-LVL V: ICD-10-PCS | Mod: PBBFAC,,, | Performed by: PSYCHIATRY & NEUROLOGY

## 2021-07-08 PROCEDURE — 3008F BODY MASS INDEX DOCD: CPT | Mod: CPTII,S$GLB,, | Performed by: PSYCHIATRY & NEUROLOGY

## 2021-07-09 ENCOUNTER — TELEPHONE (OUTPATIENT)
Dept: NEUROLOGY | Facility: CLINIC | Age: 26
End: 2021-07-09

## 2021-07-10 ENCOUNTER — LAB VISIT (OUTPATIENT)
Dept: LAB | Facility: HOSPITAL | Age: 26
End: 2021-07-10
Attending: PSYCHIATRY & NEUROLOGY
Payer: COMMERCIAL

## 2021-07-10 DIAGNOSIS — G40.909 NONINTRACTABLE EPILEPSY WITHOUT STATUS EPILEPTICUS, UNSPECIFIED EPILEPSY TYPE: ICD-10-CM

## 2021-07-10 PROCEDURE — 80175 DRUG SCREEN QUAN LAMOTRIGINE: CPT | Performed by: PSYCHIATRY & NEUROLOGY

## 2021-07-10 PROCEDURE — 36415 COLL VENOUS BLD VENIPUNCTURE: CPT | Mod: PO | Performed by: PSYCHIATRY & NEUROLOGY

## 2021-07-13 ENCOUNTER — PATIENT MESSAGE (OUTPATIENT)
Dept: NEUROLOGY | Facility: CLINIC | Age: 26
End: 2021-07-13

## 2021-07-13 ENCOUNTER — OFFICE VISIT (OUTPATIENT)
Dept: FAMILY MEDICINE | Facility: CLINIC | Age: 26
End: 2021-07-13
Payer: COMMERCIAL

## 2021-07-13 VITALS
BODY MASS INDEX: 51.65 KG/M2 | OXYGEN SATURATION: 99 % | SYSTOLIC BLOOD PRESSURE: 112 MMHG | HEART RATE: 103 BPM | DIASTOLIC BLOOD PRESSURE: 78 MMHG | WEIGHT: 273.38 LBS

## 2021-07-13 DIAGNOSIS — G40.909 EPILEPSY: ICD-10-CM

## 2021-07-13 DIAGNOSIS — G40.409 OTHER GENERALIZED EPILEPSY, NOT INTRACTABLE, WITHOUT STATUS EPILEPTICUS: Primary | ICD-10-CM

## 2021-07-13 DIAGNOSIS — Z23 ENCOUNTER FOR IMMUNIZATION: Primary | ICD-10-CM

## 2021-07-13 PROCEDURE — 1126F PR PAIN SEVERITY QUANTIFIED, NO PAIN PRESENT: ICD-10-PCS | Mod: S$GLB,,, | Performed by: FAMILY MEDICINE

## 2021-07-13 PROCEDURE — 3008F PR BODY MASS INDEX (BMI) DOCUMENTED: ICD-10-PCS | Mod: CPTII,S$GLB,, | Performed by: FAMILY MEDICINE

## 2021-07-13 PROCEDURE — 99999 PR PBB SHADOW E&M-EST. PATIENT-LVL III: ICD-10-PCS | Mod: PBBFAC,,, | Performed by: FAMILY MEDICINE

## 2021-07-13 PROCEDURE — 3008F BODY MASS INDEX DOCD: CPT | Mod: CPTII,S$GLB,, | Performed by: FAMILY MEDICINE

## 2021-07-13 PROCEDURE — 1126F AMNT PAIN NOTED NONE PRSNT: CPT | Mod: S$GLB,,, | Performed by: FAMILY MEDICINE

## 2021-07-13 PROCEDURE — 99999 PR PBB SHADOW E&M-EST. PATIENT-LVL III: CPT | Mod: PBBFAC,,, | Performed by: FAMILY MEDICINE

## 2021-07-13 PROCEDURE — 99213 PR OFFICE/OUTPT VISIT, EST, LEVL III, 20-29 MIN: ICD-10-PCS | Mod: S$GLB,,, | Performed by: FAMILY MEDICINE

## 2021-07-13 PROCEDURE — 99213 OFFICE O/P EST LOW 20 MIN: CPT | Mod: S$GLB,,, | Performed by: FAMILY MEDICINE

## 2021-07-14 ENCOUNTER — TELEPHONE (OUTPATIENT)
Dept: NEUROLOGY | Facility: CLINIC | Age: 26
End: 2021-07-14

## 2021-07-14 LAB — LAMOTRIGINE SERPL-MCNC: 6 UG/ML (ref 2–15)

## 2021-07-23 ENCOUNTER — TELEPHONE (OUTPATIENT)
Dept: NEUROLOGY | Facility: CLINIC | Age: 26
End: 2021-07-23

## 2021-07-23 ENCOUNTER — PATIENT MESSAGE (OUTPATIENT)
Dept: NEUROLOGY | Facility: CLINIC | Age: 26
End: 2021-07-23

## 2021-07-29 ENCOUNTER — TELEPHONE (OUTPATIENT)
Dept: NEUROLOGY | Facility: CLINIC | Age: 26
End: 2021-07-29

## 2021-08-24 ENCOUNTER — PATIENT MESSAGE (OUTPATIENT)
Dept: OBSTETRICS AND GYNECOLOGY | Facility: CLINIC | Age: 26
End: 2021-08-24

## 2021-08-26 ENCOUNTER — PATIENT OUTREACH (OUTPATIENT)
Dept: ADMINISTRATIVE | Facility: OTHER | Age: 26
End: 2021-08-26

## 2021-08-26 ENCOUNTER — TELEPHONE (OUTPATIENT)
Dept: NEUROLOGY | Facility: CLINIC | Age: 26
End: 2021-08-26

## 2021-09-15 ENCOUNTER — TELEPHONE (OUTPATIENT)
Dept: NEUROLOGY | Facility: CLINIC | Age: 26
End: 2021-09-15
Payer: COMMERCIAL

## 2021-10-04 ENCOUNTER — TELEPHONE (OUTPATIENT)
Dept: NEUROLOGY | Facility: CLINIC | Age: 26
End: 2021-10-04

## 2021-11-02 ENCOUNTER — TELEPHONE (OUTPATIENT)
Dept: NEUROLOGY | Facility: CLINIC | Age: 26
End: 2021-11-02
Payer: COMMERCIAL

## 2021-11-11 ENCOUNTER — TELEPHONE (OUTPATIENT)
Dept: NEUROLOGY | Facility: CLINIC | Age: 26
End: 2021-11-11
Payer: COMMERCIAL

## 2021-12-20 ENCOUNTER — TELEPHONE (OUTPATIENT)
Dept: FAMILY MEDICINE | Facility: CLINIC | Age: 26
End: 2021-12-20
Payer: COMMERCIAL

## 2021-12-21 ENCOUNTER — OFFICE VISIT (OUTPATIENT)
Dept: URGENT CARE | Facility: CLINIC | Age: 26
End: 2021-12-21
Payer: COMMERCIAL

## 2021-12-21 VITALS
BODY MASS INDEX: 49.09 KG/M2 | OXYGEN SATURATION: 98 % | WEIGHT: 260 LBS | SYSTOLIC BLOOD PRESSURE: 114 MMHG | HEART RATE: 98 BPM | RESPIRATION RATE: 16 BRPM | TEMPERATURE: 98 F | DIASTOLIC BLOOD PRESSURE: 77 MMHG | HEIGHT: 61 IN

## 2021-12-21 DIAGNOSIS — R05.9 COUGH: Primary | ICD-10-CM

## 2021-12-21 DIAGNOSIS — J06.9 VIRAL URI: ICD-10-CM

## 2021-12-21 LAB
CTP QC/QA: YES
SARS-COV-2 RDRP RESP QL NAA+PROBE: NEGATIVE

## 2021-12-21 PROCEDURE — U0002 COVID-19 LAB TEST NON-CDC: HCPCS | Mod: QW,S$GLB,, | Performed by: EMERGENCY MEDICINE

## 2021-12-21 PROCEDURE — 99214 PR OFFICE/OUTPT VISIT, EST, LEVL IV, 30-39 MIN: ICD-10-PCS | Mod: S$GLB,CS,, | Performed by: EMERGENCY MEDICINE

## 2021-12-21 PROCEDURE — U0002: ICD-10-PCS | Mod: QW,S$GLB,, | Performed by: EMERGENCY MEDICINE

## 2021-12-21 PROCEDURE — 99214 OFFICE O/P EST MOD 30 MIN: CPT | Mod: S$GLB,CS,, | Performed by: EMERGENCY MEDICINE

## 2022-04-04 ENCOUNTER — TELEPHONE (OUTPATIENT)
Dept: DERMATOLOGY | Facility: CLINIC | Age: 27
End: 2022-04-04
Payer: COMMERCIAL

## 2022-04-04 NOTE — TELEPHONE ENCOUNTER
----- Message from Christian Floyd sent at 4/4/2022 11:22 AM CDT -----  Contact: Mariajose  Type:  Sooner Appointment Request  Caller is requesting a sooner appointment.  Caller declined first available appointment listed below.  Caller will not accept being placed on the waitlist and is requesting a message be sent to doctor.  Name of Caller:  Patient estrella Billy  When is the first available appointment?  11/01/22  Symptoms:  acne   Best Call Back Number:  [phone]  Additional Information:  Pt mom requesting a call back for sooner appt. Pt mom denied first available for 11/01/22.

## 2022-04-05 ENCOUNTER — OFFICE VISIT (OUTPATIENT)
Dept: DERMATOLOGY | Facility: CLINIC | Age: 27
End: 2022-04-05
Payer: COMMERCIAL

## 2022-04-05 DIAGNOSIS — L70.0 ACNE VULGARIS: Primary | ICD-10-CM

## 2022-04-05 PROCEDURE — 1159F MED LIST DOCD IN RCRD: CPT | Mod: CPTII,95,, | Performed by: STUDENT IN AN ORGANIZED HEALTH CARE EDUCATION/TRAINING PROGRAM

## 2022-04-05 PROCEDURE — 1160F RVW MEDS BY RX/DR IN RCRD: CPT | Mod: CPTII,95,, | Performed by: STUDENT IN AN ORGANIZED HEALTH CARE EDUCATION/TRAINING PROGRAM

## 2022-04-05 PROCEDURE — 99214 PR OFFICE/OUTPT VISIT, EST, LEVL IV, 30-39 MIN: ICD-10-PCS | Mod: 95,,, | Performed by: STUDENT IN AN ORGANIZED HEALTH CARE EDUCATION/TRAINING PROGRAM

## 2022-04-05 PROCEDURE — 1160F PR REVIEW ALL MEDS BY PRESCRIBER/CLIN PHARMACIST DOCUMENTED: ICD-10-PCS | Mod: CPTII,95,, | Performed by: STUDENT IN AN ORGANIZED HEALTH CARE EDUCATION/TRAINING PROGRAM

## 2022-04-05 PROCEDURE — 1159F PR MEDICATION LIST DOCUMENTED IN MEDICAL RECORD: ICD-10-PCS | Mod: CPTII,95,, | Performed by: STUDENT IN AN ORGANIZED HEALTH CARE EDUCATION/TRAINING PROGRAM

## 2022-04-05 PROCEDURE — 99214 OFFICE O/P EST MOD 30 MIN: CPT | Mod: 95,,, | Performed by: STUDENT IN AN ORGANIZED HEALTH CARE EDUCATION/TRAINING PROGRAM

## 2022-04-05 RX ORDER — DOXYCYCLINE 100 MG/1
1 TABLET ORAL 2 TIMES DAILY
Qty: 28 TABLET | Refills: 0 | Status: SHIPPED | OUTPATIENT
Start: 2022-04-05 | End: 2022-08-09 | Stop reason: ALTCHOICE

## 2022-04-05 RX ORDER — CLASCOTERONE 1 G/100G
1 CREAM TOPICAL 2 TIMES DAILY
Qty: 60 G | Refills: 2 | Status: SHIPPED | OUTPATIENT
Start: 2022-04-05 | End: 2022-08-09 | Stop reason: ALTCHOICE

## 2022-04-05 NOTE — PROGRESS NOTES
Patient Information  Name: Terra Hamilton  : 1995  MRN: [id number]     Referring Physician:  Dr. Juares ref. provider found   Primary Care Physician:  Dr. Roman Man MD   Date of Visit: 2022      Subjective:       Terra Hamilton is a 26 y.o. female who presents for acne    HPI  The patient location is: [place]  The chief complaint leading to consultation is: acne    Visit type: audiovisual    Face to Face time with patient: 10 min  12 minutes of total time spent on the encounter, which includes face to face time and non-face to face time preparing to see the patient (eg, review of tests), Obtaining and/or reviewing separately obtained history, Documenting clinical information in the electronic or other health record, Independently interpreting results (not separately reported) and communicating results to the patient/family/caregiver, or Care coordination (not separately reported).     Each patient to whom he or she provides medical services by telemedicine is:  (1) informed of the relationship between the physician and patient and the respective role of any other health care provider with respect to management of the patient; and (2) notified that he or she may decline to receive medical services by telemedicine and may withdraw from such care at any time.    Notes:   Patient with new complaint of lesion(s)  Location: face  Duration: years  Symptoms: red bumps  Relieving factors/Previous treatments: spironolactone with side effects so no longer taking this. Clindamycin gel, minocycline, tazorac without improvement    Patient with hx of PCOS.    Patient was last seen:Visit date not found     Prior notes by myself reviewed.   Clinical documentation obtained by nursing staff reviewed.    Review of Systems   Skin: Negative for itching and rash.        Objective:    Physical Exam   Constitutional: She appears well-developed and well-nourished. No distress.   Neurological: She is alert and  oriented to person, place, and time. She is not disoriented.   Psychiatric: She has a normal mood and affect.   Skin:   Areas Examined (abnormalities noted in diagram):   Head / Face Inspection Performed  Neck Inspection Performed              Diagram Legend     Erythematous scaling macule/papule c/w actinic keratosis       Vascular papule c/w angioma      Pigmented verrucoid papule/plaque c/w seborrheic keratosis      Yellow umbilicated papule c/w sebaceous hyperplasia      Irregularly shaped tan macule c/w lentigo     1-2 mm smooth white papules consistent with Milia      Movable subcutaneous cyst with punctum c/w epidermal inclusion cyst      Subcutaneous movable cyst c/w pilar cyst      Firm pink to brown papule c/w dermatofibroma      Pedunculated fleshy papule(s) c/w skin tag(s)      Evenly pigmented macule c/w junctional nevus     Mildly variegated pigmented, slightly irregular-bordered macule c/w mildly atypical nevus      Flesh colored to evenly pigmented papule c/w intradermal nevus       Pink pearly papule/plaque c/w basal cell carcinoma      Erythematous hyperkeratotic cursted plaque c/w SCC      Surgical scar with no sign of skin cancer recurrence      Open and closed comedones      Inflammatory papules and pustules      Verrucoid papule consistent consistent with wart     Erythematous eczematous patches and plaques     Dystrophic onycholytic nail with subungual debris c/w onychomycosis     Umbilicated papule    Erythematous-base heme-crusted tan verrucoid plaque consistent with inflamed seborrheic keratosis     Erythematous Silvery Scaling Plaque c/w Psoriasis     See annotation            [] Data reviewed  [] Independent review of test  [] Management discussed with another provider    Assessment / Plan:        Acne vulgaris  -     clascoterone (WINLEVI) 1 % Crea; Apply 1 application topically 2 (two) times daily.  Dispense: 60 g; Refill: 2  -     doxycycline monohydrate 100 mg Tab; Take 1 tablet (100  mg total) by mouth 2 (two) times a day.  Dispense: 28 tablet; Refill: 0  Discussed benefits and risks of doxycyline therapy including but not limited to GI discomfort, esophageal irritation/ulceration, and increased sun sensitivity. Patient was counseled to take medicine with meals and at least 1 hour before lying down.              LOS NUMBER AND COMPLEXITY OF PROBLEMS    COMPLEXITY OF DATA RISK TOTAL TIME (m)   13483  70592 [] 1 self-limited or minor problem [x] Minimal to none [] No treatment recommended or patient to monitor 15-29  10-19   34009  30246 Low  [] 2 or > self limited or minor problems  [] 1 stable chronic illness  [] 1 acute, uncomplicated illness or injury Limited (2)  [] Prior external notes from each unique source  [] Review result of each unique test  [] Order each unique test []  Low  OTC medications, minor skin biopsy 30-44  20-29   83153  07028 Moderate  [x]  1 or > chronic illness with progression, exacerbation or SE of treatment  []  2 or more stable chronic illnesses  []  1 acute illness with systemic symptoms  []  1 acute complicated injury  []  1 undiagnosed new problem with uncertain prognosis Moderate (1/3 below)  []  3 or more data items        *Now includes assessment requiring independent historian  []  Independent interpretation of a test  []  Discuss management/test with another provider Moderate  [x]  Prescription drug mgmt  []  Minor surgery with risk discussed  []  Mgmt limited by social determinates 45-59  30-39   69841  94702 High  []  1 or more chronic illness with severe exacerbation, progression or SE of treatment  []  1 acute or chronic illness/injury that poses a threat to life or bodily function Extensive (2/3 below)  []  3 or more data items        *Now includes assessment requiring independent historian.  []  Independent interpretation of a test  []  Discuss management/test with another provider High  []  Major surgery with risk discussed  []  Drug therapy requiring  intensive monitoring for toxicity  []  Hospitalization  []  Decision for DNR 60-74  40-54      No follow-ups on file.    Maliha Gasca MD, FAAD  [facility]

## 2022-05-26 ENCOUNTER — OFFICE VISIT (OUTPATIENT)
Dept: FAMILY MEDICINE | Facility: CLINIC | Age: 27
End: 2022-05-26
Payer: COMMERCIAL

## 2022-05-26 ENCOUNTER — TELEPHONE (OUTPATIENT)
Dept: FAMILY MEDICINE | Facility: CLINIC | Age: 27
End: 2022-05-26
Payer: COMMERCIAL

## 2022-05-26 DIAGNOSIS — G40.909 NONINTRACTABLE EPILEPSY WITHOUT STATUS EPILEPTICUS, UNSPECIFIED EPILEPSY TYPE: ICD-10-CM

## 2022-05-26 DIAGNOSIS — J06.9 UPPER RESPIRATORY TRACT INFECTION, UNSPECIFIED TYPE: Primary | ICD-10-CM

## 2022-05-26 PROCEDURE — 99213 OFFICE O/P EST LOW 20 MIN: CPT | Mod: 95,,, | Performed by: PHYSICIAN ASSISTANT

## 2022-05-26 PROCEDURE — 99213 PR OFFICE/OUTPT VISIT, EST, LEVL III, 20-29 MIN: ICD-10-PCS | Mod: 95,,, | Performed by: PHYSICIAN ASSISTANT

## 2022-05-26 RX ORDER — LEVOCETIRIZINE DIHYDROCHLORIDE 5 MG/1
5 TABLET, FILM COATED ORAL NIGHTLY
Qty: 30 TABLET | Refills: 0 | Status: SHIPPED | OUTPATIENT
Start: 2022-05-26 | End: 2022-08-09 | Stop reason: ALTCHOICE

## 2022-05-26 RX ORDER — METHYLPREDNISOLONE 4 MG/1
TABLET ORAL
Qty: 1 EACH | Refills: 0 | Status: SHIPPED | OUTPATIENT
Start: 2022-05-26 | End: 2022-08-09

## 2022-05-26 RX ORDER — PROMETHAZINE HYDROCHLORIDE AND DEXTROMETHORPHAN HYDROBROMIDE 6.25; 15 MG/5ML; MG/5ML
5 SYRUP ORAL 3 TIMES DAILY PRN
Qty: 180 ML | Refills: 0 | Status: SHIPPED | OUTPATIENT
Start: 2022-05-26 | End: 2022-06-05

## 2022-05-26 NOTE — PROGRESS NOTES
Subjective:       Patient ID: Terra Hamilton is a 26 y.o. female.    Chief Complaint: URI    The patient location is: [place], [place]  The chief complaint leading to consultation is: URI    Visit type: audiovisual    Face to Face time with patient: 20 minutes of total time spent on the encounter, which includes face to face time and non-face to face time preparing to see the patient (eg, review of tests), Obtaining and/or reviewing separately obtained history, Documenting clinical information in the electronic or other health record, Independently interpreting results (not separately reported) and communicating results to the patient/family/caregiver, or Care coordination (not separately reported).         Each patient to whom he or she provides medical services by telemedicine is:  (1) informed of the relationship between the physician and patient and the respective role of any other health care provider with respect to management of the patient; and (2) notified that he or she may decline to receive medical services by telemedicine and may withdraw from such care at any time.    Notes: Patient is a 27 yo female with a history of seizures. She reports cold symptoms.     Cough  This is a new problem. The current episode started in the past 7 days. The problem has been gradually worsening. The problem occurs every few minutes. The cough is non-productive. Associated symptoms include nasal congestion, postnasal drip, rhinorrhea and a sore throat. Pertinent negatives include no chest pain or shortness of breath. Nothing aggravates the symptoms. She has tried OTC cough suppressant and rest for the symptoms. The treatment provided mild relief. Her past medical history is significant for bronchitis. There is no history of asthma, bronchiectasis, COPD, emphysema, environmental allergies or pneumonia.     Past Medical History:   Diagnosis Date    Focal seizures     History of polycystic ovarian disease      Seizures     2013    Speech delay      Has not had a seizure in a year.     Review of Systems   Constitutional: Negative for activity change and appetite change.   HENT: Positive for postnasal drip, rhinorrhea and sore throat.    Respiratory: Positive for cough. Negative for chest tightness and shortness of breath.    Cardiovascular: Negative for chest pain.   Gastrointestinal: Negative for abdominal pain.   Allergic/Immunologic: Negative for environmental allergies.         Objective:      Physical Exam  Constitutional:       General: She is not in acute distress.     Appearance: Normal appearance. She is ill-appearing. She is not toxic-appearing or diaphoretic.   Pulmonary:      Effort: Pulmonary effort is normal.   Neurological:      Mental Status: She is alert.   Psychiatric:         Mood and Affect: Mood normal.         Assessment:       Problem List Items Addressed This Visit     Epilepsy      Other Visit Diagnoses     Upper respiratory tract infection, unspecified type    -  Primary          Plan:       Upper respiratory tract infection, unspecified type  -     POCT COVID-19 Rapid Screening    Nonintractable epilepsy without status epilepticus, unspecified epilepsy type  stable  Other orders  -     methylPREDNISolone (MEDROL DOSEPACK) 4 mg tablet; use as directed  Dispense: 1 each; Refill: 0  -     levocetirizine (XYZAL) 5 MG tablet; Take 1 tablet (5 mg total) by mouth every evening.  Dispense: 30 tablet; Refill: 0  -     promethazine-dextromethorphan (PROMETHAZINE-DM) 6.25-15 mg/5 mL Syrp; Take 5 mLs by mouth 3 (three) times daily as needed (cough).  Dispense: 180 mL; Refill: 0

## 2022-05-26 NOTE — PATIENT INSTRUCTIONS
Terra Hamilton    I would like you to get a COVID test done if this is ok with you. I put in an order so that your can go get one done in the [facility] or [facility].

## 2022-05-26 NOTE — TELEPHONE ENCOUNTER
----- Message from Breanna Quevedo, Patient Care Assistant sent at 5/26/2022  9:09 AM CDT -----  Regarding: same day  Contact: jam matthews's mother  Type:  Same Day Appointment Request    Caller is requesting a same day appointment.  Caller declined first available appointment listed below.      Name of Caller:  jam matthews's mother   When is the first available appointment?  2022  Symptoms:  coughing and sinus congestion  Best Call Back Number:  [phone]     Additional Information: please call jam matthews's mother to advise. Thanks!

## 2022-05-26 NOTE — TELEPHONE ENCOUNTER
Patient just calling to confirm appt time, she was confused at first. Pt understands appt is today at 320.

## 2022-05-26 NOTE — TELEPHONE ENCOUNTER
----- Message from Yoli Wilcox sent at 5/26/2022 12:34 PM CDT -----  Type:  Patient Returning Call    Who Called:pt     Who Left Message for Patient: nurse     Does the patient know what this is regarding?:yes in regarding virtual visit     Would the patient rather a call back or a response via [facility]? Call back     Best Call Back Number:[phone] ([place])     Additional Information:

## 2022-06-29 ENCOUNTER — TELEPHONE (OUTPATIENT)
Dept: BARIATRICS | Facility: CLINIC | Age: 27
End: 2022-06-29
Payer: COMMERCIAL

## 2022-06-29 NOTE — PROGRESS NOTES
Subjective:       Patient ID: Terra Hamilton is a 26 y.o. female.    Chief Complaint: Consult    CC: Weight    New pt to me, referred by Aaareferral Self  No address on file , with Patient Active Problem List:     Viral gastroenteritis     Chronic constipation with overflow     Epilepsy     Plantar wart     Pes valgus     Hallux abducto valgus     Hallux valgus of right foot     PCOS- metformin in past. Was not helping her PCOS sx     Lab Results       Component                Value               Date                       HGBA1C                   4.9                 06/01/2021                 HGBA1C                   4.7                 01/22/2021                 HGBA1C                   4.6                 10/14/2019            Lab Results       Component                Value               Date                       LDLCALC                  134.0               01/22/2021                 CREATININE               0.9                 06/01/2021               Current attempts at weight loss:  Goes to gym 1-2 times a week with walking, elliptical and some weights.     Previous diet attempts: Optavia.     History of medication for loss:   checked today.  Denies.     Heaviest weight: 284#    Lightest weight: 216#    Goal weight: 200# to start.       Last eye exam:      Not recent. No known eye problems.       Provider:    Typical eating patterns:  Working and in school. Lives with mom, dad, and sister. Dad does cooking. Has issues with textures, does not like vegetables.   Breakfast:  Oatmeal- flavored. Blueberry muffin. Coffee delite or banana boat smoothie from [employer]. Weekends: may skip. Cereal toast, biscuit or eggs.     Lunch: chicken or turkey. Turkey sandwich with chips or goldfish. Grilled or fried chicken with mac and cheese or fries.     Dinner: Similar to lunch.     Snacks: gold fish. Jello.     Beverages: mocha velvet ice. Water. Sports drinks. Soda. ETOH- rum or vodka with juice or soda x 2-5 drinks 2  "times a month    Willingness to change:  6/10    Cardiac studies:     BMR: 1607    PBF:  55.6%    Review of Systems      Objective:     Blood pressure 117/67, pulse 90, temperature 98.9 °F (37.2 °C), height 5' 2.5" (1.588 m), weight 128.9 kg (284 lb 3.2 oz), last menstrual period 06/25/2022, SpO2 98 %.     Physical Exam  Vitals reviewed.   Constitutional:       General: She is not in acute distress.     Appearance: She is well-developed.      Comments: With severe obesity     HENT:      Head: Normocephalic and atraumatic.   Eyes:      General: No scleral icterus.     Pupils: Pupils are equal, round, and reactive to light.   Neck:      Thyroid: No thyromegaly.   Cardiovascular:      Rate and Rhythm: Normal rate.      Heart sounds: Normal heart sounds. No murmur heard.    No friction rub. No gallop.   Pulmonary:      Effort: Pulmonary effort is normal. No respiratory distress.      Breath sounds: Normal breath sounds. No wheezing.   Abdominal:      General: Bowel sounds are normal. There is no distension.      Palpations: Abdomen is soft.      Tenderness: There is no abdominal tenderness.   Musculoskeletal:         General: Normal range of motion.      Cervical back: Normal range of motion and neck supple.   Skin:     General: Skin is warm and dry.      Findings: No erythema.      Comments: acneic   Neurological:      Mental Status: She is alert and oriented to person, place, and time.      Cranial Nerves: No cranial nerve deficit.   Psychiatric:         Behavior: Behavior normal.         Judgment: Judgment normal.         Assessment:       Problem List Items Addressed This Visit     Epilepsy      Other Visit Diagnoses     Class 3 severe obesity with serious comorbidity and body mass index (BMI) of 50.0 to 59.9 in adult, unspecified obesity type    -  Primary    PCOS (polycystic ovarian syndrome)        Relevant Medications    semaglutide (OZEMPIC) 0.25 mg or 0.5 mg(2 mg/1.5 mL) pen injector          Plan:         "     1. Class 3 severe obesity with serious comorbidity and body mass index (BMI) of 50.0 to 59.9 in adult, unspecified obesity type  Start Ozempic once a week. Start with 0.25mg once a week x 4 weeks, then 0.5 mg weekly.       Decrease portions as soon as you start Ozempic. Avoid fried, greasy, fatty foods.     Some nausea in the first 2 weeks is not unusual.     If you get pain across the upper abdomen and around to your back, please call the office.       [email or web address] for coupon/videos.       No soda, sweet tea, juices or lemonade. All drinks should be 5 calories or less.       Look into food aversions therapist.       Increase low impact activity as tolerated.  Avoid high impact activity, very heavy lifting or other exercise regimens that may cause discomfort.     1200 josefina pb meal planner    2. Nonintractable epilepsy without status epilepticus, unspecified epilepsy type  Avoid diethylpropion and contrave    3. PCOS (polycystic ovarian syndrome)  Failed metformin  - semaglutide (OZEMPIC) 0.25 mg or 0.5 mg(2 mg/1.5 mL) pen injector; Inject 0.5 mg into the skin every 7 days.  Dispense: 3 pen; Refill: 0

## 2022-06-30 ENCOUNTER — OFFICE VISIT (OUTPATIENT)
Dept: BARIATRICS | Facility: CLINIC | Age: 27
End: 2022-06-30

## 2022-06-30 VITALS
WEIGHT: 284.19 LBS | OXYGEN SATURATION: 98 % | TEMPERATURE: 99 F | DIASTOLIC BLOOD PRESSURE: 67 MMHG | HEIGHT: 63 IN | HEART RATE: 90 BPM | BODY MASS INDEX: 50.36 KG/M2 | SYSTOLIC BLOOD PRESSURE: 117 MMHG

## 2022-06-30 DIAGNOSIS — E66.01 CLASS 3 SEVERE OBESITY WITH SERIOUS COMORBIDITY AND BODY MASS INDEX (BMI) OF 50.0 TO 59.9 IN ADULT, UNSPECIFIED OBESITY TYPE: Primary | ICD-10-CM

## 2022-06-30 DIAGNOSIS — E28.2 PCOS (POLYCYSTIC OVARIAN SYNDROME): ICD-10-CM

## 2022-06-30 DIAGNOSIS — G40.909 NONINTRACTABLE EPILEPSY WITHOUT STATUS EPILEPTICUS, UNSPECIFIED EPILEPSY TYPE: ICD-10-CM

## 2022-06-30 PROCEDURE — 99999 PR PBB SHADOW E&M-EST. PATIENT-LVL IV: ICD-10-PCS | Mod: PBBFAC,,, | Performed by: INTERNAL MEDICINE

## 2022-06-30 PROCEDURE — 99214 PR OFFICE/OUTPT VISIT, EST, LEVL IV, 30-39 MIN: ICD-10-PCS | Mod: S$GLB,,, | Performed by: INTERNAL MEDICINE

## 2022-06-30 PROCEDURE — 99999 PR PBB SHADOW E&M-EST. PATIENT-LVL IV: CPT | Mod: PBBFAC,,, | Performed by: INTERNAL MEDICINE

## 2022-06-30 PROCEDURE — 99214 OFFICE O/P EST MOD 30 MIN: CPT | Mod: S$GLB,,, | Performed by: INTERNAL MEDICINE

## 2022-06-30 RX ORDER — SEMAGLUTIDE 1.34 MG/ML
0.5 INJECTION, SOLUTION SUBCUTANEOUS
Qty: 3 PEN | Refills: 0 | Status: SHIPPED | OUTPATIENT
Start: 2022-06-30 | End: 2022-08-09

## 2022-06-30 NOTE — PATIENT INSTRUCTIONS
"Start Ozempic once a week. Start with 0.25mg once a week x 4 weeks, then 0.5 mg weekly.       Decrease portions as soon as you start Ozempic. Avoid fried, greasy, fatty foods.     Some nausea in the first 2 weeks is not unusual.     If you get pain across the upper abdomen and around to your back, please call the office.       [email or web address] for coupon/videos.       No soda, sweet tea, juices or lemonade. All drinks should be 5 calories or less.       Look into food aversions therapist.       Increase low impact activity as tolerated.  Avoid high impact activity, very heavy lifting or other exercise regimens that may cause discomfort.                                       1200 calorie  Meal Plan  STARCHES 80 CALORIES PER SERVING 15g CARB, 3g PROTEIN, 1g FAT  Servings per day   bread   tortilla   crackers   cooked cereals   dry cereals   pasta   rice   corn   popcorn   potato (small)   potato, mashed   sweet potato   squash, winter   cooked beans, peas, lentils (add 1 meat exchange)   1 slice   1 (6")   4-6 (3/4 oz)   1/2 cup   3/4 cup   1/2 cup   1/3 cup  1/2 cup   1 small light bag  1 (3 oz)   1/2 cup   1/3 cup   1 cup   1/2 cup Most starches are a good source of B vitamins   Choose whole grain foods such as 100% whole wheat bread and flour, brown rice, tortillas, etc. for nutrients and fiber.   Combine beans (starch & meat) with grains (starch) for their complimentary proteins and fiber   Combine grains (starch) with milk (milk) or cheese (meat) to compliment proteins     3   FRUIT 60 CALORIES PER SERVING 15g CARB    fresh fruit   banana  melon (cubes)   berries  canned fruit   dried fruit    1 small   1/2  ½ cup   ¾ cup  ½ cup   ¼ cup    Choose whole fruits for fiber   No fruit juices   2   DAIRY  CALORIES PER SERVING 12g CARB, 8g PROTEIN, 0-8g FAT    milk   yogurt  Protein soy or almond milk 1 cup   1 cup   1 cup Use unsweetened almond or soy milk with added protein  Avoid chocolate or flavored " milk  Avoid yogurt with more than 8 gms of sugar. Gms of protein should be higher than grams of sugar               2   MEAT AND SUBSTITUES  CALORIES PER SERVING 7g Protein, 0-13g FAT    Meat,Seafood and fish   cheese   cottage cheese   egg   peanut butter   tofu or tempeh  cooked beans, peas, lentils (add 1 starch)  Quinoa (add 1 starch)   Nuts and seeds (½ serving protein + 1 fat)  Nutritional yeast  Morning Star grillers 1 oz     1 oz  1/4 cup     1   1.5 Tbsp   4 oz (1/2 cup)   1/2 cup              ¼ cup            2 tablespoons    1 gregory or ½ cup      Choose fish, seafood and lower fat cheeses  Limit frying or adding fat.      9   FATS 45 CALORIES PER SERVING     oil   mayonnaise   cream cheese   salad dressing   peanuts   avocado   butter or margarine    1 tsp   1 tsp   1 Tbsp   1 Tbsp   10   1/8   1 tsp Eat less fat.   Eat less saturated fat such as animal fat found in fatter meat, cheese, and butter. Also eat less hydrogenated fat.      2-3   VEGETABLES 25 CALORIES PER SERVING 5 g CARB, 2g PROTEIN    raw vegetables   cooked vegetables   tomato or vegetable juice 1 cup   1/2 cup     1/2 cup Choose dark green leafy and deep yellow vegetables such as spinach, zuchinni, squash, mushrooms, cauliflower ,broccoli, carrots, and peppers.   Unlimited         1200 CALORIE MEAL PLAN  Eat 3 small meals per day with 1-2 small snacks to keep you full throughout the day  Aim for at least 15 gms of protein at breakfast, at least 25 grams at lunch and at least 25 grams of protein at dinner.  Snacks should contain at least 5 grams of protein  Limit starches to 1 serving per meal or snack.  Keep your carbs whole grain or whole wheat  Limit your intake of refined sugar including sugary beverages ie sweet tea, lemonade, fruit punch             Fruit Protein Dairy Starch Fats Calories   Breakfast 1 2 1 1  340   Lunch  3  1 1 290   Dinner 1 4  1 1 405   Snack   1 1  170   Total 2 9 2 4 2 1205     Sample breakfasts:  2  scrambled eggs (use spray), 1 cup Protein Silk soy milk, 1 slice whole wheat toast, 1 small orange  Omelet made with 1 egg, 1-ounce low fat cheese and non-starchy veggies, 1 low fat plain yogurt with ½ banana  Plain yogurt with ¾ cup unsweetened cold cereal and ½ cup fresh fruit salad, 2 hardboiled eggs  Sample lunches:  ½ whole wheat bagel topped with 3 ounces of low fat cheese melted in oven with a wild green salad with 1 TBSP dressing  ¾ cup cooked beans with 6 whole grain crackers, 10 roasted peanuts  ½ medium baked potato with 3 ounces of low fat cheddar cheese and 1 TBSP  sour cream  1 small wheat tortilla brushed with 1 tsp olive oil then brushed with pizza sauce and 4 ounces low fat cheese, sliced mushrooms and green peppers broiled until cheese is melted.  ½ cup chopped melon    Sample Dinners:  4 ounces of tuna pan seared in 1 tsp olive oil, ½ baked small sweet potato and 2 small plums  ½ cup chick peas, 1 cup cauliflower sauteed with 1 tbsp chopped onion, 1 tsp oil, and 2 tsp flores powder. Add low sodium vegetable stock to desired consistency. Serve with ½ cup brown rice  Red beans seasoned with onions and bell peppers served over cauliflower rice  1 cup cooked green or brown lentils served with ½ cup cooked quinoa and chopped tomatoes and cucumbers and 1 tbsp feta cheese  Sample Snacks:  1 cup of Protein Silk Soy milk with 3 squares moris crackers  3/4 ounce Triscuits with 1 ounce cubed cheese  Chobani Triple Zero yogurt with ¾ cup unsweetened cereal  ½ cup edamame (shelled)        Meal Ideas for Regular Bariatric Diet  *Recipes and products available at www.bariatriceating.com      Breakfast: (15-20g protein)    - Egg white omelet: 2 egg whites or ½ cup Egg Beaters. (Optional proteins: cheese, shrimp, black beans, chicken, sliced turkey) (Optional veggies: tomatoes, salsa, spinach, mushrooms, onions, green peppers, or small slice avocado)     - Egg and sausage: 1 egg or ¼ cup Egg Beaters (any variety),  with 1 gregory or 2 links of Turkey sausage or Veggie breakfast sausage ([employer] or iQuantifi.com)    - Crust-less breakfast quiche: To make a glass pie dish, mix 4oz part skim Ricotta, 1 cup skim milk, and 2 eggs as your base. Add protein: shredded cheese, sliced lean ham or turkey, turkey antonio/sausage. Add veggies: tomato, onion, green onion, mushroom, green pepper, spinach, etc.    - Yogurt parfait: Mix 1 - 6oz container Dannon Light N Fit vanilla yogurt, with ¼ cup Kashi Go Lean cereal    - Cottage cheese and fruit: ½ cup part-skim cottage cheese or ricotta cheese topped with fresh fruit or sugar free preserves     - Careyjuwan Silva's Vanilla Egg custard* (add 2 Tbsp instant coffee granules to make Cappuccino Custard*)    - Hi-Protein café latte (skim milk, decaf coffee, 1 scoop protein powder). Optional to add Sugar free syrup or extract flavoring.    Lunch: (20-30g protein)    - ½ cup Black bean soup (Homemade or Progresso), with ¼ cup shredded low-fat cheese. Top with chopped tomato or fresh salsa.     - Lean deli turkey breast and low-fat sliced cheese, mustard or light castano to moisten, rolled up together, or wrapped in a [place] lettuce leaf    - Chicken salad made from dinner leftovers, moisten with low-fat salad dressing or light castano. Also try leftover salmon, shrimp, tuna or boiled eggs. Serve ½ cup over dark green salad    - Fat-free canned refried beans, topped with ¼ cup shredded low-fat cheese. Top with chopped tomato or fresh salsa.     - Greek salad: Top mixed greens with 1-2oz grilled chicken, tomatoes, red onions, 2-3 kalamata olives, and sprinkle lightly with feta cheese. Spritz with Balsamic vinegar to taste.     - Crust-less lunch quiche: To make a glass pie dish, mix 4oz part skim Ricotta, 1 cup skim milk, and 2 eggs as your base. Add protein: shredded cheese, sliced lean ham or turkey, shrimp, chicken. Add veggies: tomato, onion, green onion, mushroom, green pepper, spinach, artichoke,  broccoli, etc.    - Pizza bake: tomato sauce, low-fat shredded mozzarella and turkey pepperoni or [place] antonio. Add any veggies.    - Cucumber crab bites: Spread ¼ cup crab dip (lump crabmeat + light cream cheese and green onions) over sliced cucumber.     - Chicken with light spinach and artichoke dip*: Puree in : 6oz cooked and drained spinach, 2 cloves garlic, 1 can cannelloni beans, ½ cup chopped green onions, 1 can drained artichoke hearts (not marinated in oil), lemon juice and basil. Mix in 2oz chopped up chicken.    Supper: (20-30g protein)    - Serve grilled fish over dark green salad tossed with low-fat dressing, served with grilled asparagus king     - Rotisserie chicken salad: served with sliced strawberries, walnuts, fat-free feta cheese crumbles and 1 tbsp Pierces Own Light Raspberry [place] Vinaigrette    - Shrimp cocktail: Dip cold boiled shrimp in homemade low-sugar cocktail sauce (1/2 cup Beckie One Carb ketchup, 2 tbsp horseradish, 1/4 tsp hot sauce, 1 tsp Worcestershire sauce, 1 tbsp freshly-squeezed lemon juice). Serve with dark green salad, walnuts, and crumbled blue cheese drizzled with olive oil and Balsamic vinegar    - Tuna Melt: Spread tuna salad onto 2 thick slices of tomato. Top with low-fat cheese and broil until cheese is melted. May also be made with chicken salad of shrimp salad. [place] with different types of cheeses.    - Homemade low-fat Chili using extra lean ground beef or ground turkey. Top with shredded cheese and salsa as desired. May add dollop fat-free sour cream if desired    - Dinner Omelet with shrimp or chicken and onion, green peppers and chives.    - No noodle lasagna: Use sliced zucchini or eggplant in place of noodles.  Layer with part skim ricotta cheese and low sugar meat sauce (use very lean ground beef or ground turkey).    - Mexican chicken bake: Bake chunks of chicken breast or thigh with taco seasoning, Pace brand enchilada sauce, green  onions and low-fat cheese. Serve with ¼ cup black beans or fat free refried beans topped with chopped tomatoes or salsa.    - Victor Hugo frozen meatballs, simmered in Classico Marinara sauce. Different flavors of salsa or spaghetti sauce create different dishes! Sprinkle with parmesan cheese. Serve with grilled or steamed veggies, or a dark green salad.    - Simmer boneless skinless chicken thigh chunks in Classico Marinara sauce or roasted salsa until tender with chopped onion, bell pepper, garlic, mushrooms, spinach, etc.     - Hamburger, without the bun, dressed the way you like. Served with grilled or steamed veggies.    - Eggplant parmesan: Bake slices of eggplant at 350 degrees for 15 minutes. Layer tomato sauce, sliced eggplant and low-fat mozzarella cheese in a baking dish and cover with foil. Bake 30-40 more minutes or until bubbly. Uncover and bake at 400 degrees for about 15 more minutes, or until top is slightly crisp.    - Fish tacos: grilled/baked white fish, wrapped in Ermias lettuce leaf, topped with salsa, shredded low-fat cheese, and light coleslaw.    Snacks: (100-200 calories; >5g protein)    - 1 low-fat cheese stick with 8 cherry tomatoes or 1 serving fresh fruit  - 4 thin slices fat-free turkey breast and 1 slice low-fat cheese  - 4 thin slices fat-free honey ham with wedge of melon  - 1/4 cup unsalted nuts with ½ cup fruit  - 6-oz container Dannon Light n Fit vanilla yogurt, topped with 1oz unsalted nuts         - apple, celery or baby carrots spread with 2 Tbsp natural peanut butter or almond butter   - apple slices with 1 oz slice low-fat cheese  - celery, cucumber, bell pepper or baby carrots dipped in ¼ cup hummus bean spread or light spinach and artichoke dip (*recipe in lunch section)  - 100 calorie bag microwave light popcorn with 3 tbsp grated parmesan cheese  - Ronny Links Beef Steak - 14g protein! (similar to beef jerky)  - 2 wedges Laughing Cow - Light Herb & Garlic Cheese with  sliced cucumber or green bell pepper  - 1/2 cup low-fat cottage cheese with ¼ cup fruit or ¼ cup salsa  - RTD Protein drinks: Atkins, Low Carb Slim Fast, EAS light, Muscle Milk Light, etc.  - Homemade Protein drinks: GNC Soy95, Isopure, Nectar, UNJURY, Whey Gourmet, etc. Mix 1 scoop powder with 8oz skim/1% milk or light soymilk.  - Protein bars: Atkins, EAS, Pure Protein, Think Thin, Detour, etc. Must have 0-4 grams sugar - Read the label.    Takeout Options: No more than twice/week  Deli - Salads (no pasta or rice), meats, cheeses. Roasted chicken. Lox (salmon)    Mexican - Platters which don't include tortillas, chips, or rice. Go easy on the beans. Example: Fajitas without the tortillas. Ask the  not to bring chips to the table if they are too tempting.    Greek - Meat or fish and vegetable, but no bread or rice. Including hummus, baba ganoush, etc, is OK. Most sit-down Greek restaurants can provide you with cucumber slices for dipping instead of santi bread.    Fast Food (Avoid as much as possible) - Salads (no croutons and limit salad dressing to 2 tbsp), grilled chicken sandwich without the bun and ask for no castano. Shanells low fat chili or Taco Bell pintos and cheese.    BBQ - The meats are fine if you ask for sauces on the side, but most of the traditional side dishes are loaded with carbs. Alli slaw, baked beans and BBQ sauce are typically made with sugar.    Chinese - Nothing deep-fried, no rice or noodles. Many Chinese sauces have starch and sugar in them, so you'll have to use your judgement. If you find that these sauces trigger cravings, or cause Dumping, you can ask for the sauce to be made without sugar or just use soy sauce.

## 2022-08-03 ENCOUNTER — PATIENT MESSAGE (OUTPATIENT)
Dept: BARIATRICS | Facility: CLINIC | Age: 27
End: 2022-08-03
Payer: COMMERCIAL

## 2022-08-09 ENCOUNTER — OFFICE VISIT (OUTPATIENT)
Dept: BARIATRICS | Facility: CLINIC | Age: 27
End: 2022-08-09

## 2022-08-09 VITALS
HEIGHT: 63 IN | WEIGHT: 280.88 LBS | SYSTOLIC BLOOD PRESSURE: 109 MMHG | OXYGEN SATURATION: 96 % | HEART RATE: 91 BPM | BODY MASS INDEX: 49.77 KG/M2 | DIASTOLIC BLOOD PRESSURE: 57 MMHG

## 2022-08-09 DIAGNOSIS — E66.01 CLASS 3 SEVERE OBESITY WITH SERIOUS COMORBIDITY AND BODY MASS INDEX (BMI) OF 50.0 TO 59.9 IN ADULT, UNSPECIFIED OBESITY TYPE: Primary | ICD-10-CM

## 2022-08-09 DIAGNOSIS — E28.2 PCOS (POLYCYSTIC OVARIAN SYNDROME): ICD-10-CM

## 2022-08-09 PROCEDURE — 99999 PR PBB SHADOW E&M-EST. PATIENT-LVL IV: ICD-10-PCS | Mod: PBBFAC,,, | Performed by: INTERNAL MEDICINE

## 2022-08-09 PROCEDURE — 99999 PR PBB SHADOW E&M-EST. PATIENT-LVL IV: CPT | Mod: PBBFAC,,, | Performed by: INTERNAL MEDICINE

## 2022-08-09 PROCEDURE — 99213 PR OFFICE/OUTPT VISIT, EST, LEVL III, 20-29 MIN: ICD-10-PCS | Mod: S$GLB,,, | Performed by: INTERNAL MEDICINE

## 2022-08-09 PROCEDURE — 99213 OFFICE O/P EST LOW 20 MIN: CPT | Mod: S$GLB,,, | Performed by: INTERNAL MEDICINE

## 2022-08-09 NOTE — PATIENT INSTRUCTIONS
Start Ozempic 1 mg once a week.    Decrease portions as soon as you start Ozempic. Avoid fried, greasy, fatty foods.     Some nausea in the first 2 weeks is not unusual.     If you get pain across the upper abdomen and around to your back, please call the office.       Www.[employer] for coupon/videos.       No soda, sweet tea, juices or lemonade. All drinks should be 5 calories or less.       Look into food aversions therapist.       Increase low impact activity as tolerated.  Avoid high impact activity, very heavy lifting or other exercise regimens that may cause discomfort.     1200 josefina pb meal planner given previously.     We have been notified of upcoming changes to the "[employer]sSET employee health plan that may reduce your access to semaglutide (also known as Ozempic or Rybelsus). As the [facility] employee plan also does not offer coverage for the formulation of semaglutide approved for weight loss, Wegovy, we will have to transition you off of the medication. The options available for patients coming off of semaglutide will be evaluated individually as your new care plan is formulated. Some patients may not have a better, accessible medical treatment option.   If you have concerns about these changes in coverage, you may want to contact the insurance plan and human resource department to let them know how this personally affects you.     [facility]   [phone] ([facility])   [facility]  [phone] (option 6)      Tips for preparing for hurricane season:    If you are on weight loss medications, please take your medication with you in case of evacuation. Injectable medications should be transported with an ice pack if unopened or room temperature if you have started to use them.   Hurricane supplies do not necessarily need to be junk food or high in carbs or sugar. Shelf stable and healthy choices to include in your supplies  could include:  Canned/packets of tuna or chicken  Apples, oranges, banana, pears  Beef or turkey jerky  Sugar free pudding  Pickle, olives, dill relish (mix with the tuna or chicken!)  Low sodium or no salt added canned vegetables  If you get bread, get lite bread (40 calories a slice)  0 sugar sports drinks  Water  String cheese will be okay for a few days without refrigeration or in an ice chest.   Peanut or other nut butter.   Parmesan crisps  Pork skins  Protein shakes (20-30g protein, less than 5 g sugar)  Protein bars (15 or more g protein, less than 5 g sugar)  Don't forget disposable forks, spoons, plates in your supplies  If evacuated, manage stress by taking walks, reading or meditating.   If eating out make better choices when possible.   Salads with a lean protein and limited dressing, cheese or other toppings  Grilled chicken sandwich or burger without the bun.   Skip the fries!  Order water or unsweetened tea instead of soda  Grocery stores with a deli, salad/food bar can be a good quick and affordable option to replace fast food

## 2022-08-09 NOTE — PROGRESS NOTES
"Subjective:       Patient ID: Terra Hamilton is a 26 y.o. female.    Chief Complaint: Follow-up    CC: Weight  Pt here today for follow-up. Has lost 4 lbs (-2.5 lbs muscle. -3.1 lbs fat). Was to start 1200 josefina pb meal planner, continue exercise and started Ozempic. She does feel like her appetite is down. Denies SE.  Patient's last menstrual period was 08/01/2022. She has been trying some new things like asparagus and edamame. She is dtill eating some fried foods.     New BMR: 1605    New PBF: 55.1%      Initial:  BMR: 1607    PBF:  55.6%    Lab Results   Component Value Date    HGBA1C 4.9 06/01/2021    HGBA1C 4.7 01/22/2021    HGBA1C 4.6 10/14/2019     Lab Results   Component Value Date    LDLCALC 134.0 01/22/2021    CREATININE 0.9 06/01/2021        Review of Systems      Objective:     Blood pressure (!) 109/57, pulse 91, height 5' 2.5" (1.588 m), weight 127.4 kg (280 lb 14.4 oz), last menstrual period 08/01/2022, SpO2 96 %.     Physical Exam  Vitals reviewed.   Constitutional:       General: She is not in acute distress.     Appearance: She is well-developed.      Comments: With severe obesity     HENT:      Head: Normocephalic and atraumatic.   Eyes:      General: No scleral icterus.     Pupils: Pupils are equal, round, and reactive to light.   Neck:      Thyroid: No thyromegaly.   Cardiovascular:      Rate and Rhythm: Normal rate.      Pulmonary:      Effort: Pulmonary effort is normal. No respiratory distress.        Musculoskeletal:         General: Normal range of motion.       Skin:     General: Skin is warm and dry.      Findings: No erythema. acneic.  Neurological:      Mental Status: She is alert and oriented to person, place, and time.      Cranial Nerves: No cranial nerve deficit.   Psychiatric:         Behavior: Behavior normal.         Judgment: Judgment normal.         Assessment:       Problem List Items Addressed This Visit    None     Visit Diagnoses     Class 3 severe obesity with " serious comorbidity and body mass index (BMI) of 50.0 to 59.9 in adult, unspecified obesity type    -  Primary    PCOS (polycystic ovarian syndrome)              Plan:           Terra was seen today for follow-up.    Diagnoses and all orders for this visit:    Class 3 severe obesity with serious comorbidity and body mass index (BMI) of 50.0 to 59.9 in adult, unspecified obesity type    PCOS (polycystic ovarian syndrome)    Other orders  -     semaglutide (OZEMPIC) 1 mg/dose (4 mg/3 mL); Inject 1 mg into the skin every 7 days.       Start Ozempic 1 mg once a week.    Continue decreased portions as soon as you start Ozempic. Avoid fried, greasy, fatty foods.     Some nausea in the first 2 weeks is not unusual.     If you get pain across the upper abdomen and around to your back, please call the office.       Www.[email or web address] for coupon/videos.       No soda, sweet tea, juices or lemonade. All drinks should be 5 calories or less.     Look into food aversions therapist.     Increase low impact activity as tolerated.  Avoid high impact activity, very heavy lifting or other exercise regimens that may cause discomfort.     1200 josefina pb meal planner given previously/     Doing well trying new things.     Hurricane tips given.

## 2022-08-30 ENCOUNTER — TELEPHONE (OUTPATIENT)
Dept: FAMILY MEDICINE | Facility: CLINIC | Age: 27
End: 2022-08-30
Payer: COMMERCIAL

## 2022-08-30 ENCOUNTER — PATIENT MESSAGE (OUTPATIENT)
Dept: FAMILY MEDICINE | Facility: CLINIC | Age: 27
End: 2022-08-30
Payer: COMMERCIAL

## 2022-08-30 NOTE — TELEPHONE ENCOUNTER
----- Message from Breanna Quevedo, Patient Care Assistant sent at 8/30/2022  8:35 AM CDT -----  Regarding: same day  Contact: [phone]  Type:  Sooner Appointment Request    Caller is requesting a sooner appointment.  Caller declined first available appointment listed below.  Caller will not accept being placed on the waitlist and is requesting a message be sent to doctor.    Name of Caller:  [id number]  When is the first available appointment?  2022  Symptoms:  UTI   Best Call Back Number:  [phone]     Additional Information:  please call to advise. Thanks!

## 2022-08-30 NOTE — TELEPHONE ENCOUNTER
----- Message from Anne Cespedes sent at 8/30/2022  3:15 PM CDT -----  Contact: Patient  Type: Patient Call Back         Who called: Patient         What is the request in detail:   I have Terra Hamilton returning your missed call regarding sched and appt; please advise         Can the clinic reply by [facility]? you         Would the patient rather a call back or a response via [facility]? [facility]         Best call back number: [phone]         Additional Information:            Thank You

## 2022-09-01 ENCOUNTER — OFFICE VISIT (OUTPATIENT)
Dept: FAMILY MEDICINE | Facility: CLINIC | Age: 27
End: 2022-09-01
Payer: COMMERCIAL

## 2022-09-01 VITALS
WEIGHT: 282.63 LBS | HEART RATE: 74 BPM | BODY MASS INDEX: 52.01 KG/M2 | SYSTOLIC BLOOD PRESSURE: 128 MMHG | DIASTOLIC BLOOD PRESSURE: 68 MMHG | HEIGHT: 62 IN | TEMPERATURE: 98 F

## 2022-09-01 DIAGNOSIS — R30.0 DYSURIA: Primary | ICD-10-CM

## 2022-09-01 DIAGNOSIS — N39.0 URINARY TRACT INFECTION WITHOUT HEMATURIA, SITE UNSPECIFIED: ICD-10-CM

## 2022-09-01 LAB
BILIRUB SERPL-MCNC: ABNORMAL MG/DL
BLOOD URINE, POC: ABNORMAL
COLOR, POC UA: YELLOW
GLUCOSE UR QL STRIP: NORMAL
KETONES UR QL STRIP: ABNORMAL
LEUKOCYTE ESTERASE URINE, POC: ABNORMAL
NITRITE, POC UA: ABNORMAL
PH, POC UA: 6
PROTEIN, POC: ABNORMAL
SPECIFIC GRAVITY, POC UA: 1.01
UROBILINOGEN, POC UA: NORMAL

## 2022-09-01 PROCEDURE — 99999 PR PBB SHADOW E&M-EST. PATIENT-LVL III: CPT | Mod: PBBFAC,,, | Performed by: PHYSICIAN ASSISTANT

## 2022-09-01 PROCEDURE — 87186 SC STD MICRODIL/AGAR DIL: CPT | Performed by: PHYSICIAN ASSISTANT

## 2022-09-01 PROCEDURE — 1160F RVW MEDS BY RX/DR IN RCRD: CPT | Mod: CPTII,S$GLB,, | Performed by: PHYSICIAN ASSISTANT

## 2022-09-01 PROCEDURE — 1160F PR REVIEW ALL MEDS BY PRESCRIBER/CLIN PHARMACIST DOCUMENTED: ICD-10-PCS | Mod: CPTII,S$GLB,, | Performed by: PHYSICIAN ASSISTANT

## 2022-09-01 PROCEDURE — 99213 OFFICE O/P EST LOW 20 MIN: CPT | Mod: S$GLB,,, | Performed by: PHYSICIAN ASSISTANT

## 2022-09-01 PROCEDURE — 1159F MED LIST DOCD IN RCRD: CPT | Mod: CPTII,S$GLB,, | Performed by: PHYSICIAN ASSISTANT

## 2022-09-01 PROCEDURE — 3078F DIAST BP <80 MM HG: CPT | Mod: CPTII,S$GLB,, | Performed by: PHYSICIAN ASSISTANT

## 2022-09-01 PROCEDURE — 87086 URINE CULTURE/COLONY COUNT: CPT | Performed by: PHYSICIAN ASSISTANT

## 2022-09-01 PROCEDURE — 3008F BODY MASS INDEX DOCD: CPT | Mod: CPTII,S$GLB,, | Performed by: PHYSICIAN ASSISTANT

## 2022-09-01 PROCEDURE — 81001 POCT URINALYSIS, DIPSTICK OR TABLET REAGENT, AUTOMATED, WITH MICROSCOP: ICD-10-PCS | Mod: S$GLB,,, | Performed by: PHYSICIAN ASSISTANT

## 2022-09-01 PROCEDURE — 87088 URINE BACTERIA CULTURE: CPT | Performed by: PHYSICIAN ASSISTANT

## 2022-09-01 PROCEDURE — 3074F PR MOST RECENT SYSTOLIC BLOOD PRESSURE < 130 MM HG: ICD-10-PCS | Mod: CPTII,S$GLB,, | Performed by: PHYSICIAN ASSISTANT

## 2022-09-01 PROCEDURE — 99213 PR OFFICE/OUTPT VISIT, EST, LEVL III, 20-29 MIN: ICD-10-PCS | Mod: S$GLB,,, | Performed by: PHYSICIAN ASSISTANT

## 2022-09-01 PROCEDURE — 81001 URINALYSIS AUTO W/SCOPE: CPT | Mod: S$GLB,,, | Performed by: PHYSICIAN ASSISTANT

## 2022-09-01 PROCEDURE — 99999 PR PBB SHADOW E&M-EST. PATIENT-LVL III: ICD-10-PCS | Mod: PBBFAC,,, | Performed by: PHYSICIAN ASSISTANT

## 2022-09-01 PROCEDURE — 1159F PR MEDICATION LIST DOCUMENTED IN MEDICAL RECORD: ICD-10-PCS | Mod: CPTII,S$GLB,, | Performed by: PHYSICIAN ASSISTANT

## 2022-09-01 PROCEDURE — 3078F PR MOST RECENT DIASTOLIC BLOOD PRESSURE < 80 MM HG: ICD-10-PCS | Mod: CPTII,S$GLB,, | Performed by: PHYSICIAN ASSISTANT

## 2022-09-01 PROCEDURE — 87077 CULTURE AEROBIC IDENTIFY: CPT | Performed by: PHYSICIAN ASSISTANT

## 2022-09-01 PROCEDURE — 3008F PR BODY MASS INDEX (BMI) DOCUMENTED: ICD-10-PCS | Mod: CPTII,S$GLB,, | Performed by: PHYSICIAN ASSISTANT

## 2022-09-01 PROCEDURE — 3074F SYST BP LT 130 MM HG: CPT | Mod: CPTII,S$GLB,, | Performed by: PHYSICIAN ASSISTANT

## 2022-09-01 RX ORDER — SULFAMETHOXAZOLE AND TRIMETHOPRIM 800; 160 MG/1; MG/1
1 TABLET ORAL 2 TIMES DAILY
Qty: 20 TABLET | Refills: 0 | Status: SHIPPED | OUTPATIENT
Start: 2022-09-01 | End: 2022-09-05 | Stop reason: ALTCHOICE

## 2022-09-01 NOTE — PROGRESS NOTES
Subjective:       Patient ID: Terra Hamilton is a 26 y.o. female.    Chief Complaint: Urinary Frequency (Urine w/ odor. Urinary frequency. No burn w/ urination.)    HPI  Urgency and strong urine smell x 3 or 4 days  Last UTI 5 yrs. ago  Review of Systems   Constitutional:  Positive for activity change. Negative for appetite change, chills, diaphoresis, fatigue, fever and unexpected weight change.   HENT: Negative.     Eyes: Negative.    Respiratory: Negative.  Negative for cough and shortness of breath.    Cardiovascular: Negative.  Negative for chest pain and leg swelling.   Gastrointestinal: Negative.    Endocrine: Negative.    Genitourinary:  Positive for frequency and urgency. Negative for dysuria, flank pain and hematuria.   Musculoskeletal: Negative.    Integumentary:  Negative for rash. Negative.   Neurological: Negative.        Objective:      Physical Exam  Constitutional:       General: She is not in acute distress.     Appearance: Normal appearance. She is obese. She is not ill-appearing, toxic-appearing or diaphoretic.   HENT:      Head: Normocephalic and atraumatic.   Abdominal:      General: Abdomen is flat. There is no distension.      Palpations: Abdomen is soft. There is no mass.      Tenderness: There is no abdominal tenderness. There is no guarding or rebound.      Hernia: No hernia is present.   Musculoskeletal:         General: No swelling.      Right lower leg: No edema.      Left lower leg: No edema.   Skin:     General: Skin is warm and dry.      Findings: No rash.   Neurological:      General: No focal deficit present.      Mental Status: She is alert and oriented to person, place, and time.       Assessment:       Problem List Items Addressed This Visit    None  Visit Diagnoses       Dysuria    -  Primary    Relevant Orders    POCT urinalysis, dipstick or tablet reag (Completed)    Urine culture              Plan:       Terra was seen today for urinary frequency.    Diagnoses and  all orders for this visit:    Dysuria  -     POCT urinalysis, dipstick or tablet reag  -     Urine culture  -     sulfamethoxazole-trimethoprim 800-160mg (BACTRIM DS) 800-160 mg Tab; Take 1 tablet by mouth 2 (two) times daily. for 10 days    Urinary tract infection without hematuria, site unspecified  -     sulfamethoxazole-trimethoprim 800-160mg (BACTRIM DS) 800-160 mg Tab; Take 1 tablet by mouth 2 (two) times daily. for 10 days         Hydrate  Discussed otc's  Discussed urinary health    Dip urine positive nitrite    Urine culture confirms infection  Resistant to Septra

## 2022-09-04 LAB — BACTERIA UR CULT: ABNORMAL

## 2022-09-05 RX ORDER — NITROFURANTOIN 25; 75 MG/1; MG/1
100 CAPSULE ORAL 2 TIMES DAILY
Qty: 20 CAPSULE | Refills: 0 | Status: SHIPPED | OUTPATIENT
Start: 2022-09-05 | End: 2022-09-15

## 2022-09-26 ENCOUNTER — PATIENT MESSAGE (OUTPATIENT)
Dept: INFECTIOUS DISEASES | Facility: CLINIC | Age: 27
End: 2022-09-26
Payer: COMMERCIAL

## 2022-09-26 ENCOUNTER — PATIENT MESSAGE (OUTPATIENT)
Dept: FAMILY MEDICINE | Facility: CLINIC | Age: 27
End: 2022-09-26
Payer: COMMERCIAL

## 2022-09-26 DIAGNOSIS — Z23 NEED FOR PROPHYLACTIC VACCINATION AGAINST RABIES: Primary | ICD-10-CM

## 2022-09-28 NOTE — TELEPHONE ENCOUNTER
Please notify patient that I signed orders to the infectious disease department at [facility] 1st available to initiate her pre- exposed rabies vaccine series for [employer].  Please schedule her an appointment with provider in our Infectious Disease Department for further evaluation and treatment

## 2022-10-06 ENCOUNTER — PATIENT MESSAGE (OUTPATIENT)
Dept: BARIATRICS | Facility: CLINIC | Age: 27
End: 2022-10-06
Payer: COMMERCIAL

## 2022-10-24 RX ORDER — LAMOTRIGINE 200 MG/1
TABLET ORAL
Qty: 135 TABLET | Refills: 3 | OUTPATIENT
Start: 2022-10-24

## 2022-10-24 RX ORDER — LAMOTRIGINE 200 MG/1
300 TABLET ORAL 2 TIMES DAILY
Qty: 90 TABLET | Refills: 0 | Status: SHIPPED | OUTPATIENT
Start: 2022-10-24 | End: 2022-12-02 | Stop reason: SDUPTHER

## 2022-10-27 ENCOUNTER — TELEPHONE (OUTPATIENT)
Dept: NEUROLOGY | Facility: CLINIC | Age: 27
End: 2022-10-27
Payer: COMMERCIAL

## 2022-10-27 NOTE — TELEPHONE ENCOUNTER
----- Message from Jacky Barahona sent at 10/27/2022  2:09 PM CDT -----  Regarding: sooner appt // med refill  Type:  Sooner Appointment Request    Caller is requesting a sooner appointment.      Name of Caller:  Terra    When is the first available appointment?  No solution found before the template release date of 1/21/2023    Symptoms:  sz f/u with med refill    Best Call Back Number:  [phone]     Additional Information:

## 2022-12-02 ENCOUNTER — OFFICE VISIT (OUTPATIENT)
Dept: NEUROLOGY | Facility: CLINIC | Age: 27
End: 2022-12-02
Payer: COMMERCIAL

## 2022-12-02 VITALS
HEART RATE: 95 BPM | WEIGHT: 283.31 LBS | BODY MASS INDEX: 52.14 KG/M2 | DIASTOLIC BLOOD PRESSURE: 77 MMHG | SYSTOLIC BLOOD PRESSURE: 123 MMHG | HEIGHT: 62 IN

## 2022-12-02 DIAGNOSIS — G40.909 NONINTRACTABLE EPILEPSY WITHOUT STATUS EPILEPTICUS, UNSPECIFIED EPILEPSY TYPE: Primary | ICD-10-CM

## 2022-12-02 PROCEDURE — 1159F PR MEDICATION LIST DOCUMENTED IN MEDICAL RECORD: ICD-10-PCS | Mod: CPTII,S$GLB,, | Performed by: PSYCHIATRY & NEUROLOGY

## 2022-12-02 PROCEDURE — 3078F PR MOST RECENT DIASTOLIC BLOOD PRESSURE < 80 MM HG: ICD-10-PCS | Mod: CPTII,S$GLB,, | Performed by: PSYCHIATRY & NEUROLOGY

## 2022-12-02 PROCEDURE — 1160F RVW MEDS BY RX/DR IN RCRD: CPT | Mod: CPTII,S$GLB,, | Performed by: PSYCHIATRY & NEUROLOGY

## 2022-12-02 PROCEDURE — 3074F PR MOST RECENT SYSTOLIC BLOOD PRESSURE < 130 MM HG: ICD-10-PCS | Mod: CPTII,S$GLB,, | Performed by: PSYCHIATRY & NEUROLOGY

## 2022-12-02 PROCEDURE — 99999 PR PBB SHADOW E&M-EST. PATIENT-LVL III: CPT | Mod: PBBFAC,,, | Performed by: PSYCHIATRY & NEUROLOGY

## 2022-12-02 PROCEDURE — 99214 PR OFFICE/OUTPT VISIT, EST, LEVL IV, 30-39 MIN: ICD-10-PCS | Mod: S$GLB,,, | Performed by: PSYCHIATRY & NEUROLOGY

## 2022-12-02 PROCEDURE — 3008F PR BODY MASS INDEX (BMI) DOCUMENTED: ICD-10-PCS | Mod: CPTII,S$GLB,, | Performed by: PSYCHIATRY & NEUROLOGY

## 2022-12-02 PROCEDURE — 1160F PR REVIEW ALL MEDS BY PRESCRIBER/CLIN PHARMACIST DOCUMENTED: ICD-10-PCS | Mod: CPTII,S$GLB,, | Performed by: PSYCHIATRY & NEUROLOGY

## 2022-12-02 PROCEDURE — 99999 PR PBB SHADOW E&M-EST. PATIENT-LVL III: ICD-10-PCS | Mod: PBBFAC,,, | Performed by: PSYCHIATRY & NEUROLOGY

## 2022-12-02 PROCEDURE — 1159F MED LIST DOCD IN RCRD: CPT | Mod: CPTII,S$GLB,, | Performed by: PSYCHIATRY & NEUROLOGY

## 2022-12-02 PROCEDURE — 3074F SYST BP LT 130 MM HG: CPT | Mod: CPTII,S$GLB,, | Performed by: PSYCHIATRY & NEUROLOGY

## 2022-12-02 PROCEDURE — 99214 OFFICE O/P EST MOD 30 MIN: CPT | Mod: S$GLB,,, | Performed by: PSYCHIATRY & NEUROLOGY

## 2022-12-02 PROCEDURE — 3008F BODY MASS INDEX DOCD: CPT | Mod: CPTII,S$GLB,, | Performed by: PSYCHIATRY & NEUROLOGY

## 2022-12-02 PROCEDURE — 3078F DIAST BP <80 MM HG: CPT | Mod: CPTII,S$GLB,, | Performed by: PSYCHIATRY & NEUROLOGY

## 2022-12-02 RX ORDER — LAMOTRIGINE 200 MG/1
300 TABLET ORAL 2 TIMES DAILY
Qty: 270 TABLET | Refills: 3 | Status: SHIPPED | OUTPATIENT
Start: 2022-12-02 | End: 2023-06-26

## 2022-12-02 NOTE — PROGRESS NOTES
"  Date: 12/2/2022    Patient ID: Terra Hamilton is a 26 y.o. female.    Chief Complaint: Seizures      History of Present Illness:  Ms. Hamilton is a 26 y.o. female who presents for followup of epilepsy.      Interval history: Since our last visit last year, she continued on lamictal 300 mg BID. Level in 2021 was 6.0.     No seizures since that time. She feels well on the medication. No side effects. No dizzy spells.   \  Seizure history:  She had onset of seizures in 2010/2011. She thinks that 2007 was actually her first seizure though. She recalls being in class and "blanked out". She woke up to seeing everyone move around to go to another class. In 7th and 8th grade, her friends would notice that she was zoning out. She recalls that she would feel like her heart dropped into her stomach and she would see white. She knew it was happening in the moment and couldn't talk or do anything about it. She would lose awareness. Her mother noted that one pupil would get big and one small. She would just stop motionless. No eye fluttering, lip smacking, or automatisms. She has never had a convulsion. She notices lack of sleep and stress as triggers. Strobe lights bother her.      When she was diagnosed, she was started on lamictal and ethosuximide at the same time. She has never been on any other medications. She was followed by Dr. Mcguire and then Dr. Elizabeth. EEG in the [facility] system in 2014 showed "generalized burst of epileptic activity".      Her last seizure was about 6 years ago. She would have a numbing sensation travel up her head and she would feel disoriented in 2019. She states the numbness would come from both ears and then travel up the back of her head. This would last about 10-15 seconds. She felt a little dizzy briefly one time. Dr. Elizabeth felt this was a migraine. They last one of these that she had was about 1 month ago. Before that, it was in March.      She is on lamictal 200 mg BID. Her last " "lamictal level was years ago. She was told it was low. She was previously on ethosuximide as well but was weaned off that in November 2018. She notes her last EEG she still had "signs of seizures" so they stayed on the lamictal. This was in December 2018. She has never had a MRI brain.       Her maternal cousin has epilepsy--he has convulsions. She notes her parents told her she was a sick baby and she cried a lot. She had big tonsils and adenoids and they were told she did not get adequate oxygen to the brain. She had a minor concussion with softball hitting her head in high school. Her mother also said as a baby she stared so wonders if she was having seizures as a baby too.     Allergies:  Review of patient's allergies indicates:  No Known Allergies    Current Medications:  Current Outpatient Medications   Medication Sig Dispense Refill    albuterol (VENTOLIN HFA) 90 mcg/actuation inhaler Inhale 2 puffs into the lungs every 6 (six) hours as needed for Wheezing. Rescue 18 g 0    multivit-min/ferrous fumarate (MULTI VITAMIN ORAL) Take 1 Dose by mouth once daily.      lamoTRIgine (LAMICTAL) 200 MG tablet Take 1.5 tablets (300 mg total) by mouth 2 (two) times daily. 270 tablet 3     No current facility-administered medications for this visit.       Past Medical History:  Past Medical History:   Diagnosis Date    Focal seizures     History of polycystic ovarian disease     Seizures     2013    Speech delay        Past Surgical History:  Past Surgical History:   Procedure Laterality Date    BUNIONECTOMY Right 6/1/2018    Procedure: BUNIONECTOMY;  Surgeon: Lm Obrien DPM;  Location: [facility] OR;  Service: Podiatry;  Laterality: Right;    CORRECTION OF HAMMER TOE Right 6/1/2018    Procedure: CORRECTION, HAMMER TOE;  Surgeon: Lm Obrien DPM;  Location: [facility] OR;  Service: Podiatry;  Laterality: Right;    FOOT ARTHROPLASTY Right 6/1/2018    Procedure: orif subtalar joint with Hyprocure implant ;  Surgeon: " "Lm Obrien DPM;  Location: [facility];  Service: Podiatry;  Laterality: Right;    FOOT SURGERY      TONSILLECTOMY, ADENOIDECTOMY, BILATERAL MYRINGOTOMY AND TUBES      WISDOM TOOTH EXTRACTION         Family History:  family history includes Anemia in her sister; Arthritis in her mother; Asthma in her sister; Breast cancer in her paternal grandmother; Cholelithiasis in her mother; Diabetes in her paternal grandfather; Endometriosis in her sister; Heart disease in her maternal grandfather and mother; Hypertension in her father, maternal grandmother, and mother; Irritable bowel syndrome in her maternal aunt; Myasthenia gravis in her paternal grandfather and paternal grandmother; Ovarian cancer in an other family member; Ulcerative colitis in her maternal uncle.    Social History:   reports that she has never smoked. She has never used smokeless tobacco. She reports current alcohol use. She reports that she does not use drugs.    Physical Exam:  Vitals:    12/02/22 0941   BP: 123/77   Pulse: 95   Weight: 128.5 kg (283 lb 4.7 oz)   Height: 5' 2" (1.575 m)   PainSc: 0-No pain     Body mass index is 51.81 kg/m².    Neurological Exam:  Mental status: Awake and alert  Speech language: No dysarthria or aphasia on conversation  Cranial nerves: Face symmetric  Motor: Moves all extremities well  Coordination: No ataxia. No tremor.      Data:  I have personally reviewed other provider's notes, labs, & imaging made available to me today.     Labs:  CBC:   Lab Results   Component Value Date    WBC 8.68 06/01/2021    HGB 12.8 06/01/2021    HCT 40.1 06/01/2021     06/01/2021    MCV 89 06/01/2021    RDW 13.9 06/01/2021     BMP:   Lab Results   Component Value Date     06/01/2021    K 4.1 06/01/2021     06/01/2021    CO2 20 (L) 06/01/2021    BUN 9 06/01/2021    CREATININE 0.9 06/01/2021    GLU 73 06/01/2021    CALCIUM 9.6 06/01/2021    MG 1.9 06/01/2021     LFTS;   Lab Results   Component Value Date    PROT 7.6 " 06/01/2021    ALBUMIN 3.7 06/01/2021    BILITOT 0.4 06/01/2021    AST 20 06/01/2021    ALKPHOS 76 06/01/2021    ALT 27 06/01/2021     COAGS:   Lab Results   Component Value Date    INR 1.0 08/12/2019     FLP:   Lab Results   Component Value Date    CHOL 219 (H) 01/22/2021    HDL 53 01/22/2021    LDLCALC 134.0 01/22/2021    TRIG 160 (H) 01/22/2021    CHOLHDL 24.2 01/22/2021       Imaging:  I have personally reviewed the imaging, MRI brain is normal    Assessment and Plan:  Ms. Hamilton is a 26 y.o. female here for followup of epilepsy. She is well controlled on current dose of lamictal. Will continue lamictal 300 mg BID. Will check trough and CBC and CMP for monitoring purposes.     Nonintractable epilepsy without status epilepticus, unspecified epilepsy type  -     Comprehensive metabolic panel; Future; Expected date: 12/02/2022  -     CBC Auto Differential; Future; Expected date: 12/02/2022  -     Lamotrigine level; Future; Expected date: 12/02/2022    Other orders  -     lamoTRIgine (LAMICTAL) 200 MG tablet; Take 1.5 tablets (300 mg total) by mouth 2 (two) times daily.  Dispense: 270 tablet; Refill: 3

## 2022-12-05 ENCOUNTER — PATIENT MESSAGE (OUTPATIENT)
Dept: FAMILY MEDICINE | Facility: CLINIC | Age: 27
End: 2022-12-05
Payer: COMMERCIAL

## 2022-12-05 DIAGNOSIS — R39.15 URGENCY OF URINATION: Primary | ICD-10-CM

## 2022-12-05 DIAGNOSIS — R10.2 SUPRAPUBIC DISCOMFORT: ICD-10-CM

## 2022-12-06 ENCOUNTER — PATIENT MESSAGE (OUTPATIENT)
Dept: FAMILY MEDICINE | Facility: CLINIC | Age: 27
End: 2022-12-06
Payer: COMMERCIAL

## 2022-12-06 NOTE — TELEPHONE ENCOUNTER
Please notify patient I have placed orders in for a urinalysis with reflex to culture.  This should be as a clean-catch midstream specimen.  Please explain to her the meaning of that.  She should also schedule appointment to either be seen by me or 1 of the other providers as soon as possible in the office as well so as to be adequately evaluated.  Push fluids with a good 6-8 glasses of fluid during the day.  She can use Tylenol extra-strength for any discomfort.

## 2022-12-07 ENCOUNTER — LAB VISIT (OUTPATIENT)
Dept: LAB | Facility: HOSPITAL | Age: 27
End: 2022-12-07
Attending: PSYCHIATRY & NEUROLOGY
Payer: COMMERCIAL

## 2022-12-07 DIAGNOSIS — G40.909 NONINTRACTABLE EPILEPSY WITHOUT STATUS EPILEPTICUS, UNSPECIFIED EPILEPSY TYPE: ICD-10-CM

## 2022-12-07 LAB
BASOPHILS # BLD AUTO: 0.05 K/UL (ref 0–0.2)
BASOPHILS NFR BLD: 0.6 % (ref 0–1.9)
DIFFERENTIAL METHOD: NORMAL
EOSINOPHIL # BLD AUTO: 0.1 K/UL (ref 0–0.5)
EOSINOPHIL NFR BLD: 1 % (ref 0–8)
ERYTHROCYTE [DISTWIDTH] IN BLOOD BY AUTOMATED COUNT: 13.2 % (ref 11.5–14.5)
HCT VFR BLD AUTO: 40.8 % (ref 37–48.5)
HGB BLD-MCNC: 13.2 G/DL (ref 12–16)
IMM GRANULOCYTES # BLD AUTO: 0.02 K/UL (ref 0–0.04)
IMM GRANULOCYTES NFR BLD AUTO: 0.2 % (ref 0–0.5)
LYMPHOCYTES # BLD AUTO: 2.5 K/UL (ref 1–4.8)
LYMPHOCYTES NFR BLD: 28.6 % (ref 18–48)
MCH RBC QN AUTO: 30.6 PG (ref 27–31)
MCHC RBC AUTO-ENTMCNC: 32.4 G/DL (ref 32–36)
MCV RBC AUTO: 94 FL (ref 82–98)
MONOCYTES # BLD AUTO: 0.4 K/UL (ref 0.3–1)
MONOCYTES NFR BLD: 4.8 % (ref 4–15)
NEUTROPHILS # BLD AUTO: 5.6 K/UL (ref 1.8–7.7)
NEUTROPHILS NFR BLD: 64.8 % (ref 38–73)
NRBC BLD-RTO: 0 /100 WBC
PLATELET # BLD AUTO: 336 K/UL (ref 150–450)
PMV BLD AUTO: 11.2 FL (ref 9.2–12.9)
RBC # BLD AUTO: 4.32 M/UL (ref 4–5.4)
WBC # BLD AUTO: 8.7 K/UL (ref 3.9–12.7)

## 2022-12-07 PROCEDURE — 36415 COLL VENOUS BLD VENIPUNCTURE: CPT | Mod: PN | Performed by: PSYCHIATRY & NEUROLOGY

## 2022-12-07 PROCEDURE — 80053 COMPREHEN METABOLIC PANEL: CPT | Performed by: PSYCHIATRY & NEUROLOGY

## 2022-12-07 PROCEDURE — 80175 DRUG SCREEN QUAN LAMOTRIGINE: CPT | Performed by: PSYCHIATRY & NEUROLOGY

## 2022-12-07 PROCEDURE — 85025 COMPLETE CBC W/AUTO DIFF WBC: CPT | Performed by: PSYCHIATRY & NEUROLOGY

## 2022-12-08 LAB
ALBUMIN SERPL BCP-MCNC: 3.9 G/DL (ref 3.5–5.2)
ALP SERPL-CCNC: 59 U/L (ref 55–135)
ALT SERPL W/O P-5'-P-CCNC: 15 U/L (ref 10–44)
ANION GAP SERPL CALC-SCNC: 12 MMOL/L (ref 8–16)
AST SERPL-CCNC: 16 U/L (ref 10–40)
BILIRUB SERPL-MCNC: 0.4 MG/DL (ref 0.1–1)
BUN SERPL-MCNC: 9 MG/DL (ref 6–20)
CALCIUM SERPL-MCNC: 9.2 MG/DL (ref 8.7–10.5)
CHLORIDE SERPL-SCNC: 107 MMOL/L (ref 95–110)
CO2 SERPL-SCNC: 19 MMOL/L (ref 23–29)
CREAT SERPL-MCNC: 0.9 MG/DL (ref 0.5–1.4)
EST. GFR  (NO RACE VARIABLE): >60 ML/MIN/1.73 M^2
GLUCOSE SERPL-MCNC: 66 MG/DL (ref 70–110)
POTASSIUM SERPL-SCNC: 4.1 MMOL/L (ref 3.5–5.1)
PROT SERPL-MCNC: 7.1 G/DL (ref 6–8.4)
SODIUM SERPL-SCNC: 138 MMOL/L (ref 136–145)

## 2022-12-12 LAB — LAMOTRIGINE SERPL-MCNC: 7.6 UG/ML (ref 2–15)

## 2022-12-31 ENCOUNTER — PATIENT MESSAGE (OUTPATIENT)
Dept: DERMATOLOGY | Facility: CLINIC | Age: 27
End: 2022-12-31
Payer: COMMERCIAL

## 2023-02-06 ENCOUNTER — OFFICE VISIT (OUTPATIENT)
Dept: URGENT CARE | Facility: CLINIC | Age: 28
End: 2023-02-06
Payer: COMMERCIAL

## 2023-02-06 VITALS
WEIGHT: 283 LBS | BODY MASS INDEX: 52.08 KG/M2 | OXYGEN SATURATION: 100 % | DIASTOLIC BLOOD PRESSURE: 76 MMHG | RESPIRATION RATE: 16 BRPM | TEMPERATURE: 99 F | HEART RATE: 106 BPM | HEIGHT: 62 IN | SYSTOLIC BLOOD PRESSURE: 128 MMHG

## 2023-02-06 DIAGNOSIS — J06.9 VIRAL URI WITH COUGH: Primary | ICD-10-CM

## 2023-02-06 DIAGNOSIS — R05.9 COUGH, UNSPECIFIED TYPE: ICD-10-CM

## 2023-02-06 DIAGNOSIS — R11.0 NAUSEA: ICD-10-CM

## 2023-02-06 LAB
CTP QC/QA: YES
SARS-COV-2 AG RESP QL IA.RAPID: NEGATIVE

## 2023-02-06 PROCEDURE — 1159F PR MEDICATION LIST DOCUMENTED IN MEDICAL RECORD: ICD-10-PCS | Mod: CPTII,S$GLB,, | Performed by: NURSE PRACTITIONER

## 2023-02-06 PROCEDURE — 99203 PR OFFICE/OUTPT VISIT, NEW, LEVL III, 30-44 MIN: ICD-10-PCS | Mod: S$GLB,,, | Performed by: NURSE PRACTITIONER

## 2023-02-06 PROCEDURE — 3078F DIAST BP <80 MM HG: CPT | Mod: CPTII,S$GLB,, | Performed by: NURSE PRACTITIONER

## 2023-02-06 PROCEDURE — 3074F PR MOST RECENT SYSTOLIC BLOOD PRESSURE < 130 MM HG: ICD-10-PCS | Mod: CPTII,S$GLB,, | Performed by: NURSE PRACTITIONER

## 2023-02-06 PROCEDURE — 3008F PR BODY MASS INDEX (BMI) DOCUMENTED: ICD-10-PCS | Mod: CPTII,S$GLB,, | Performed by: NURSE PRACTITIONER

## 2023-02-06 PROCEDURE — 3078F PR MOST RECENT DIASTOLIC BLOOD PRESSURE < 80 MM HG: ICD-10-PCS | Mod: CPTII,S$GLB,, | Performed by: NURSE PRACTITIONER

## 2023-02-06 PROCEDURE — 87811 SARS-COV-2 COVID19 W/OPTIC: CPT | Mod: QW,S$GLB,, | Performed by: NURSE PRACTITIONER

## 2023-02-06 PROCEDURE — 99203 OFFICE O/P NEW LOW 30 MIN: CPT | Mod: S$GLB,,, | Performed by: NURSE PRACTITIONER

## 2023-02-06 PROCEDURE — 1160F PR REVIEW ALL MEDS BY PRESCRIBER/CLIN PHARMACIST DOCUMENTED: ICD-10-PCS | Mod: CPTII,S$GLB,, | Performed by: NURSE PRACTITIONER

## 2023-02-06 PROCEDURE — 3074F SYST BP LT 130 MM HG: CPT | Mod: CPTII,S$GLB,, | Performed by: NURSE PRACTITIONER

## 2023-02-06 PROCEDURE — 3008F BODY MASS INDEX DOCD: CPT | Mod: CPTII,S$GLB,, | Performed by: NURSE PRACTITIONER

## 2023-02-06 PROCEDURE — 87811 SARS CORONAVIRUS 2 ANTIGEN POCT, MANUAL READ: ICD-10-PCS | Mod: QW,S$GLB,, | Performed by: NURSE PRACTITIONER

## 2023-02-06 PROCEDURE — 1159F MED LIST DOCD IN RCRD: CPT | Mod: CPTII,S$GLB,, | Performed by: NURSE PRACTITIONER

## 2023-02-06 PROCEDURE — 1160F RVW MEDS BY RX/DR IN RCRD: CPT | Mod: CPTII,S$GLB,, | Performed by: NURSE PRACTITIONER

## 2023-02-06 RX ORDER — ONDANSETRON 4 MG/1
4 TABLET, ORALLY DISINTEGRATING ORAL 2 TIMES DAILY PRN
Qty: 20 TABLET | Refills: 0 | Status: SHIPPED | OUTPATIENT
Start: 2023-02-06

## 2023-02-06 NOTE — PATIENT INSTRUCTIONS

## 2023-02-22 ENCOUNTER — PATIENT MESSAGE (OUTPATIENT)
Dept: BARIATRICS | Facility: CLINIC | Age: 28
End: 2023-02-22
Payer: COMMERCIAL

## 2023-03-17 ENCOUNTER — LAB VISIT (OUTPATIENT)
Dept: LAB | Facility: HOSPITAL | Age: 28
End: 2023-03-17
Attending: OBSTETRICS & GYNECOLOGY
Payer: COMMERCIAL

## 2023-03-17 ENCOUNTER — OFFICE VISIT (OUTPATIENT)
Dept: OBSTETRICS AND GYNECOLOGY | Facility: CLINIC | Age: 28
End: 2023-03-17
Payer: COMMERCIAL

## 2023-03-17 VITALS
HEIGHT: 62 IN | SYSTOLIC BLOOD PRESSURE: 110 MMHG | DIASTOLIC BLOOD PRESSURE: 70 MMHG | WEIGHT: 283.5 LBS | BODY MASS INDEX: 52.17 KG/M2

## 2023-03-17 DIAGNOSIS — Z72.51 HIGH RISK SEXUAL BEHAVIOR, UNSPECIFIED TYPE: Primary | ICD-10-CM

## 2023-03-17 DIAGNOSIS — Z01.419 GYNECOLOGIC EXAM NORMAL: ICD-10-CM

## 2023-03-17 DIAGNOSIS — N92.0 MENORRHAGIA WITH REGULAR CYCLE: ICD-10-CM

## 2023-03-17 DIAGNOSIS — Z72.51 HIGH RISK SEXUAL BEHAVIOR, UNSPECIFIED TYPE: ICD-10-CM

## 2023-03-17 DIAGNOSIS — N94.6 DYSMENORRHEA: ICD-10-CM

## 2023-03-17 LAB
C TRACH DNA SPEC QL NAA+PROBE: NOT DETECTED
HBV SURFACE AG SERPL QL IA: NORMAL
HCV AB SERPL QL IA: NORMAL
HIV 1+2 AB+HIV1 P24 AG SERPL QL IA: NORMAL
N GONORRHOEA DNA SPEC QL NAA+PROBE: NOT DETECTED

## 2023-03-17 PROCEDURE — 99999 PR PBB SHADOW E&M-EST. PATIENT-LVL III: ICD-10-PCS | Mod: PBBFAC,,, | Performed by: OBSTETRICS & GYNECOLOGY

## 2023-03-17 PROCEDURE — 99395 PREV VISIT EST AGE 18-39: CPT | Mod: S$GLB,,, | Performed by: OBSTETRICS & GYNECOLOGY

## 2023-03-17 PROCEDURE — 86592 SYPHILIS TEST NON-TREP QUAL: CPT | Performed by: OBSTETRICS & GYNECOLOGY

## 2023-03-17 PROCEDURE — 99395 PR PREVENTIVE VISIT,EST,18-39: ICD-10-PCS | Mod: S$GLB,,, | Performed by: OBSTETRICS & GYNECOLOGY

## 2023-03-17 PROCEDURE — 36415 COLL VENOUS BLD VENIPUNCTURE: CPT | Mod: PN | Performed by: OBSTETRICS & GYNECOLOGY

## 2023-03-17 PROCEDURE — 81514 NFCT DS BV&VAGINITIS DNA ALG: CPT | Performed by: OBSTETRICS & GYNECOLOGY

## 2023-03-17 PROCEDURE — 3074F SYST BP LT 130 MM HG: CPT | Mod: CPTII,S$GLB,, | Performed by: OBSTETRICS & GYNECOLOGY

## 2023-03-17 PROCEDURE — 87340 HEPATITIS B SURFACE AG IA: CPT | Performed by: OBSTETRICS & GYNECOLOGY

## 2023-03-17 PROCEDURE — 86803 HEPATITIS C AB TEST: CPT | Performed by: OBSTETRICS & GYNECOLOGY

## 2023-03-17 PROCEDURE — 87591 N.GONORRHOEAE DNA AMP PROB: CPT | Performed by: OBSTETRICS & GYNECOLOGY

## 2023-03-17 PROCEDURE — 3008F PR BODY MASS INDEX (BMI) DOCUMENTED: ICD-10-PCS | Mod: CPTII,S$GLB,, | Performed by: OBSTETRICS & GYNECOLOGY

## 2023-03-17 PROCEDURE — 3074F PR MOST RECENT SYSTOLIC BLOOD PRESSURE < 130 MM HG: ICD-10-PCS | Mod: CPTII,S$GLB,, | Performed by: OBSTETRICS & GYNECOLOGY

## 2023-03-17 PROCEDURE — 3078F PR MOST RECENT DIASTOLIC BLOOD PRESSURE < 80 MM HG: ICD-10-PCS | Mod: CPTII,S$GLB,, | Performed by: OBSTETRICS & GYNECOLOGY

## 2023-03-17 PROCEDURE — 87389 HIV-1 AG W/HIV-1&-2 AB AG IA: CPT | Performed by: OBSTETRICS & GYNECOLOGY

## 2023-03-17 PROCEDURE — 3078F DIAST BP <80 MM HG: CPT | Mod: CPTII,S$GLB,, | Performed by: OBSTETRICS & GYNECOLOGY

## 2023-03-17 PROCEDURE — 1159F MED LIST DOCD IN RCRD: CPT | Mod: CPTII,S$GLB,, | Performed by: OBSTETRICS & GYNECOLOGY

## 2023-03-17 PROCEDURE — 1159F PR MEDICATION LIST DOCUMENTED IN MEDICAL RECORD: ICD-10-PCS | Mod: CPTII,S$GLB,, | Performed by: OBSTETRICS & GYNECOLOGY

## 2023-03-17 PROCEDURE — 3008F BODY MASS INDEX DOCD: CPT | Mod: CPTII,S$GLB,, | Performed by: OBSTETRICS & GYNECOLOGY

## 2023-03-17 PROCEDURE — 88175 CYTOPATH C/V AUTO FLUID REDO: CPT | Performed by: OBSTETRICS & GYNECOLOGY

## 2023-03-17 PROCEDURE — 99999 PR PBB SHADOW E&M-EST. PATIENT-LVL III: CPT | Mod: PBBFAC,,, | Performed by: OBSTETRICS & GYNECOLOGY

## 2023-03-17 RX ORDER — NORETHINDRONE ACETATE AND ETHINYL ESTRADIOL .03; 1.5 MG/1; MG/1
1 TABLET ORAL DAILY
Qty: 30 EACH | Refills: 12 | Status: SHIPPED | OUTPATIENT
Start: 2023-03-17 | End: 2023-12-26

## 2023-03-17 NOTE — PROGRESS NOTES
Chief Complaint   Patient presents with    Liquiteria Woman    STD CHECK    Contraception       History and Physical:  Patient's last menstrual period was 2023 (exact date).       Terra Hamilton is a 27 y.o.   female who presents today for her routine annual GYN exam. The patient has no Gynecology complaints today. Due for STD testing, also heavy painful menses, req restart oral contraceptive pills       Allergies: Review of patient's allergies indicates:  No Known Allergies    Past Medical History:   Diagnosis Date    Focal seizures     History of polycystic ovarian disease     Seizures         Speech delay        Past Surgical History:   Procedure Laterality Date    BUNIONECTOMY Right 2018    Procedure: BUNIONECTOMY;  Surgeon: Lm Obrien DPM;  Location: [facility];  Service: Podiatry;  Laterality: Right;    CORRECTION OF HAMMER TOE Right 2018    Procedure: CORRECTION, HAMMER TOE;  Surgeon: Lm Obrien DPM;  Location: [facility];  Service: Podiatry;  Laterality: Right;    FOOT ARTHROPLASTY Right 2018    Procedure: orif subtalar joint with Hyprocure implant ;  Surgeon: Lm Obrien DPM;  Location: [facility];  Service: Podiatry;  Laterality: Right;    FOOT SURGERY      TONSILLECTOMY, ADENOIDECTOMY, BILATERAL MYRINGOTOMY AND TUBES      WISDOM TOOTH EXTRACTION         MEDS:   Current Outpatient Medications on File Prior to Visit   Medication Sig Dispense Refill    albuterol (VENTOLIN HFA) 90 mcg/actuation inhaler Inhale 2 puffs into the lungs every 6 (six) hours as needed for Wheezing. Rescue 18 g 0    lamoTRIgine (LAMICTAL) 200 MG tablet Take 1.5 tablets (300 mg total) by mouth 2 (two) times daily. 270 tablet 3    multivit-min/ferrous fumarate (MULTI VITAMIN ORAL) Take 1 Dose by mouth once daily.      ondansetron (ZOFRAN-ODT) 4 MG TbDL Take 1 tablet (4 mg total) by mouth 2 (two) times daily as needed (nausea). 20 tablet 0     No current facility-administered  "medications on file prior to visit.       OB History          0    Para   0    Term   0       0    AB   0    Living   0         SAB   0    IAB   0    Ectopic   0    Multiple   0    Live Births   0                 Social History     Socioeconomic History    Marital status: Single   Tobacco Use    Smoking status: Never    Smokeless tobacco: Never   Substance and Sexual Activity    Alcohol use: Yes     Comment: only on special occasions    Drug use: No    Sexual activity: Yes     Partners: Male     Birth control/protection: Condom   Social History Narrative    Lives with Mom, Dad.Sister is in college.1 dog, 1 cat, 1 rabbitIn 10th grade. School is "ok".       Family History   Problem Relation Age of Onset    Hypertension Mother     Heart disease Mother     Arthritis Mother     Cholelithiasis Mother     Hypertension Father     Asthma Sister     Endometriosis Sister     Anemia Sister     Hypertension Maternal Grandmother     Heart disease Maternal Grandfather     Breast cancer Paternal Grandmother     Myasthenia gravis Paternal Grandmother     Myasthenia gravis Paternal Grandfather     Diabetes Paternal Grandfather     Irritable bowel syndrome Maternal Aunt     Ulcerative colitis Maternal Uncle     Ovarian cancer Other          Past medical and surgical history reviewed.   I have reviewed the patient's medical history in detail and updated the computerized patient record.    Review of System:   General: no chills, fever, night sweats, weight gain or weight loss  Psychological: no depression or suicidal ideation  Breasts: no new or changing breast lumps, nipple discharge or masses.  Respiratory: no cough, shortness of breath, or wheezing  Cardiovascular: no chest pain or dyspnea on exertion  Gastrointestinal: no abdominal pain, change in bowel habits, or black or bloody stools  Genito-Urinary: no incontinence, urinary frequency/urgency or vulvar/vaginal symptoms, pelvic pain or abnormal vaginal " "bleeding.  Musculoskeletal: no gait disturbance or muscular weakness      Physical Exam:   /70   Ht 5' 2" (1.575 m)   Wt 128.6 kg (283 lb 8.2 oz)   LMP 02/24/2023 (Exact Date)   BMI 51.85 kg/m²   Constitutional: She appears alert and responsive. She appears well-developed, well-groomed, and well-nourished. No distress. OverWeight   HENT:   Head: Normocephalic and atraumatic.   Eyes: Conjunctivae and EOM are normal. No scleral icterus.   Neck: Symmetrical. Normal range of motion. Neck supple. No tracheal deviation present.   Cardiovascular: Normal rate, no rhythm abnormality noted. Extremities without swelling or edema, warm.    Pulmonary/Chest: Normal respiratory Effort. No distress or retractions. She exhibits no tenderness.  Breasts: are symmetrical.   Right breast exhibits no inverted nipple, no mass, no nipple discharge, no skin change and no tenderness.   Left breast exhibits no inverted nipple, no mass, no nipple discharge, no skin change and no tenderness.  Abdominal: Soft. She exhibits no distension, hernias or masses. There is no tenderness. No enlargement of liver edge or spleen.  There is no rebound and no guarding.   Genitourinary:    External rectal exam shows no thrombosed external hemorrhoids, no lesions.     Pelvic exam was performed with patient supine.   No labial fusion, and symmetrical.    There is no rash, lesion or injury on the right labia.   There is no rash, lesion or injury on the left labia.   No bleeding and no signs of injury around the vaginal introitus, urethral meatus is normal size and without prolapse or lesions, urethra well supported. The cervix is visualized with no discharge, lesions or friability.   No vaginal discharge found.    No significant Cystocele, Enterocele or rectocele, and cervix and uterus well supported.   Bimanual exam:   The urethra is normal to palpation and there are no palpable vaginal wall masses.   Uterus is not deviated, not enlarged, not fixed, " normal shape and not tender.   Cervix exhibits no motion tenderness.    Right adnexum displays no mass or nodularity and no tenderness.   Left adnexum displays no mass or nodularity and no tenderness.  Musculoskeletal: Normal range of motion.   Lymphadenopathy: No inguinal adenopathy present.   Neurological: She is alert and oriented to person, place, and time. Coordination normal.   Skin: Skin is warm and dry. She is not diaphoretic. No rashes, lesions or ulcers.   Psychiatric: She has a normal mood and affect, oriented to person, place, and time.      Assessment:   Normal annual GYN exam  1. High risk sexual behavior, unspecified type  HIV 1/2 Ag/Ab (4th Gen)    RPR    HEPATITIS C ANTIBODY    Hepatitis B Surface Antigen    C. trachomatis/N. gonorrhoeae by AMP DNA [facility]; Cervicovaginal    Vaginosis Screen by DNA Probe      2. Gynecologic exam normal  Liquid-Based Pap Smear, Screening      3. Menorrhagia with regular cycle  norethindrone ac-eth estradioL (LOESTRIN 1.5/30, 21,) 1.5-30 mg-mcg Tab      4. Dysmenorrhea  norethindrone ac-eth estradioL (LOESTRIN 1.5/30, 21,) 1.5-30 mg-mcg Tab          Plan:   PAP  STD testing  oral contraceptive pills   Follow up in 1 year.  Patient informed will be contacted with results within 2 weeks. Encouraged to please call back or email if she has not heard from us by then.

## 2023-03-18 LAB
BACTERIAL VAGINOSIS DNA: NEGATIVE
CANDIDA GLABRATA DNA: NEGATIVE
CANDIDA KRUSEI DNA: NEGATIVE
CANDIDA RRNA VAG QL PROBE: NEGATIVE
RPR SER QL: NORMAL
T VAGINALIS RRNA GENITAL QL PROBE: NEGATIVE

## 2023-03-24 LAB
FINAL PATHOLOGIC DIAGNOSIS: NORMAL
Lab: NORMAL

## 2023-03-28 ENCOUNTER — PATIENT MESSAGE (OUTPATIENT)
Dept: OBSTETRICS AND GYNECOLOGY | Facility: CLINIC | Age: 28
End: 2023-03-28
Payer: COMMERCIAL

## 2023-03-30 ENCOUNTER — PATIENT MESSAGE (OUTPATIENT)
Dept: OBSTETRICS AND GYNECOLOGY | Facility: CLINIC | Age: 28
End: 2023-03-30
Payer: COMMERCIAL

## 2023-05-02 ENCOUNTER — PATIENT MESSAGE (OUTPATIENT)
Dept: FAMILY MEDICINE | Facility: CLINIC | Age: 28
End: 2023-05-02
Payer: COMMERCIAL

## 2023-05-02 ENCOUNTER — PATIENT MESSAGE (OUTPATIENT)
Dept: DERMATOLOGY | Facility: CLINIC | Age: 28
End: 2023-05-02
Payer: COMMERCIAL

## 2023-05-03 ENCOUNTER — OFFICE VISIT (OUTPATIENT)
Dept: FAMILY MEDICINE | Facility: CLINIC | Age: 28
End: 2023-05-03
Payer: COMMERCIAL

## 2023-05-03 VITALS
HEIGHT: 62 IN | HEART RATE: 98 BPM | OXYGEN SATURATION: 98 % | DIASTOLIC BLOOD PRESSURE: 70 MMHG | SYSTOLIC BLOOD PRESSURE: 108 MMHG | WEIGHT: 283.31 LBS | BODY MASS INDEX: 52.14 KG/M2

## 2023-05-03 DIAGNOSIS — L03.313 CELLULITIS OF CHEST WALL: Primary | ICD-10-CM

## 2023-05-03 PROCEDURE — 3008F BODY MASS INDEX DOCD: CPT | Mod: CPTII,S$GLB,,

## 2023-05-03 PROCEDURE — 3074F PR MOST RECENT SYSTOLIC BLOOD PRESSURE < 130 MM HG: ICD-10-PCS | Mod: CPTII,S$GLB,,

## 2023-05-03 PROCEDURE — 1159F PR MEDICATION LIST DOCUMENTED IN MEDICAL RECORD: ICD-10-PCS | Mod: CPTII,S$GLB,,

## 2023-05-03 PROCEDURE — 3074F SYST BP LT 130 MM HG: CPT | Mod: CPTII,S$GLB,,

## 2023-05-03 PROCEDURE — 1159F MED LIST DOCD IN RCRD: CPT | Mod: CPTII,S$GLB,,

## 2023-05-03 PROCEDURE — 3078F PR MOST RECENT DIASTOLIC BLOOD PRESSURE < 80 MM HG: ICD-10-PCS | Mod: CPTII,S$GLB,,

## 2023-05-03 PROCEDURE — 3008F PR BODY MASS INDEX (BMI) DOCUMENTED: ICD-10-PCS | Mod: CPTII,S$GLB,,

## 2023-05-03 PROCEDURE — 99999 PR PBB SHADOW E&M-EST. PATIENT-LVL III: CPT | Mod: PBBFAC,,,

## 2023-05-03 PROCEDURE — 99213 PR OFFICE/OUTPT VISIT, EST, LEVL III, 20-29 MIN: ICD-10-PCS | Mod: S$GLB,,,

## 2023-05-03 PROCEDURE — 99213 OFFICE O/P EST LOW 20 MIN: CPT | Mod: S$GLB,,,

## 2023-05-03 PROCEDURE — 3078F DIAST BP <80 MM HG: CPT | Mod: CPTII,S$GLB,,

## 2023-05-03 PROCEDURE — 99999 PR PBB SHADOW E&M-EST. PATIENT-LVL III: ICD-10-PCS | Mod: PBBFAC,,,

## 2023-05-03 RX ORDER — DOXYCYCLINE HYCLATE 100 MG
100 TABLET ORAL 2 TIMES DAILY
Qty: 14 TABLET | Refills: 0 | Status: SHIPPED | OUTPATIENT
Start: 2023-05-03 | End: 2023-05-10

## 2023-05-03 NOTE — PROGRESS NOTES
[facility]  [address]  [place] [address]    Subjective    Chief Complaint:   Chief Complaint   Patient presents with    Follow-up     Left breast has cyst started Friday        History of Present Illness:     Terra Hamilton is a(n) 27 y.o. female with past medical history as noted below who presents to the clinic today for cyst under left breast.    She has a history of recurrent draining cysts under her breasts that spontaneously rupture. She has seen dermatology for this before and the diagnosis of hydradenitis has been mentioned but she states not officially diagnosed. Both grandfather and uncle had HS requiring surgical intervention.     Current cyst has been present about 5 days. She has tried warm compresses without much relief. She has not checked her temperature.        Problem List:   Patient Active Problem List   Diagnosis    Viral gastroenteritis    Chronic constipation with overflow    Epilepsy    Plantar wart    Pes valgus    Hallux abducto valgus    Hallux valgus of right foot       Current Outpatient Medications:   Current Outpatient Medications   Medication Instructions    albuterol (VENTOLIN HFA) 90 mcg/actuation inhaler 2 puffs, Inhalation, Every 6 hours PRN, Rescue    lamoTRIgine (LAMICTAL) 300 mg, Oral, 2 times daily    multivit-min/ferrous fumarate (MULTI VITAMIN ORAL) 1 Dose, Oral, Daily    norethindrone ac-eth estradioL (LOESTRIN 1.5/30, 21,) 1.5-30 mg-mcg Tab 1 tablet, Oral, Daily    ondansetron (ZOFRAN-ODT) 4 mg, Oral, 2 times daily PRN       Surgical History:   Past Surgical History:   Procedure Laterality Date    BUNIONECTOMY Right 6/1/2018    Procedure: BUNIONECTOMY;  Surgeon: Lm Obrien DPM;  Location: [facility] OR;  Service: Podiatry;  Laterality: Right;    CORRECTION OF HAMMER TOE Right 6/1/2018    Procedure: CORRECTION, HAMMER TOE;  Surgeon: Lm Obrien DPM;  Location: [facility] OR;  Service: Podiatry;   "Laterality: Right;    FOOT ARTHROPLASTY Right 6/1/2018    Procedure: orif subtalar joint with Hyprocure implant ;  Surgeon: Lm Obrien DPM;  Location: [facility];  Service: Podiatry;  Laterality: Right;    FOOT SURGERY      TONSILLECTOMY, ADENOIDECTOMY, BILATERAL MYRINGOTOMY AND TUBES      WISDOM TOOTH EXTRACTION         Family History:   Family History   Problem Relation Age of Onset    Hypertension Mother     Heart disease Mother     Arthritis Mother     Cholelithiasis Mother     Hypertension Father     Asthma Sister     Endometriosis Sister     Anemia Sister     Hypertension Maternal Grandmother     Heart disease Maternal Grandfather     Breast cancer Paternal Grandmother     Myasthenia gravis Paternal Grandmother     Myasthenia gravis Paternal Grandfather     Diabetes Paternal Grandfather     Irritable bowel syndrome Maternal Aunt     Ulcerative colitis Maternal Uncle     Ovarian cancer Other        Allergies: Review of patient's allergies indicates:  No Known Allergies    Tobacco Status:   Tobacco Use: Low Risk     Smoking Tobacco Use: Never    Smokeless Tobacco Use: Never    Passive Exposure: Not on file       Sexual Activity:   Social History     Substance and Sexual Activity   Sexual Activity Yes    Partners: Male    Birth control/protection: Condom       Alcohol Use:   Social History     Substance and Sexual Activity   Alcohol Use Yes    Comment: only on special occasions         Objective       Vitals:    05/03/23 0805   BP: 108/70   Pulse: 98   SpO2: 98%   Weight: 128.5 kg (283 lb 4.7 oz)   Height: 5' 2" (1.575 m)       Review of Systems   Constitutional:  Negative for chills and fever.   Skin:         +lump/cyst under left breast     Physical Exam  Constitutional:       General: She is not in acute distress.     Appearance: Normal appearance.   HENT:      Head: Normocephalic and atraumatic.   Cardiovascular:      Rate and Rhythm: Normal rate and regular rhythm.      Heart sounds: Normal heart " sounds. No murmur heard.  Pulmonary:      Effort: Pulmonary effort is normal. No respiratory distress.      Breath sounds: Normal breath sounds. No wheezing.   Chest:          Comments: Erythematous patch with slight fluctuance and induration and tenderness to palpation.  No obvious fluid collection or purulent drainage.  Skin:     General: Skin is warm.   Neurological:      Mental Status: She is alert and oriented to person, place, and time.   Psychiatric:         Behavior: Behavior normal.         Assessment and Plan:    1. Cellulitis of chest wall  -     doxycycline (VIBRA-TABS) 100 MG tablet; Take 1 tablet (100 mg total) by mouth 2 (two) times daily. for 7 days  Dispense: 14 tablet; Refill: 0        Visit summary:    Terra Hamilton presented today for cellulitis of chest wall.    Prescribed doxycycline 100 mg b.i.d. x7 days.  She will follow up with me in 2 days for recheck.  Sooner if new or worsening symptoms.  We discussed signs of increased infection and symptoms/signs that warrant in-person evaluation.  Continue warm compresses and Tylenol or ibuprofen for pain    Patient was instructed to report to ER if symptoms become severe.    Follow up:  With me in 2 days      Anna Lizarraga PA-C    This case discussed with Darrell Lassiter MD    This note was created partially with voice dictation software and is prone to errors. This note has been reviewed by me but some errors are inevitable.

## 2023-06-14 ENCOUNTER — TELEPHONE (OUTPATIENT)
Dept: NEUROLOGY | Facility: CLINIC | Age: 28
End: 2023-06-14
Payer: COMMERCIAL

## 2023-06-14 DIAGNOSIS — G40.909 NONINTRACTABLE EPILEPSY WITHOUT STATUS EPILEPTICUS, UNSPECIFIED EPILEPSY TYPE: Primary | ICD-10-CM

## 2023-06-14 NOTE — TELEPHONE ENCOUNTER
Notified by [facility] that patient has started on OCP. Estrogens can affect lamictal level. We need to obtain lamictal trough level and potentially adjust dose.

## 2023-06-15 NOTE — TELEPHONE ENCOUNTER
Spoke with patient and confirmed she restarted birth control in March and it is a different birth control than she was previously on. Advised could effect lamictal levels and Dr. Salinas may need to check her labs. Advised will let Dr. Salinas know and call patient back.

## 2023-06-15 NOTE — TELEPHONE ENCOUNTER
Spoke with patient and informed labs ordered. Scheduled labs for 6/22 and reminded patient to hold medication until after blood drawn. Patient voiced understanding.

## 2023-06-22 ENCOUNTER — LAB VISIT (OUTPATIENT)
Dept: LAB | Facility: HOSPITAL | Age: 28
End: 2023-06-22
Attending: PSYCHIATRY & NEUROLOGY
Payer: COMMERCIAL

## 2023-06-22 DIAGNOSIS — G40.909 NONINTRACTABLE EPILEPSY WITHOUT STATUS EPILEPTICUS, UNSPECIFIED EPILEPSY TYPE: ICD-10-CM

## 2023-06-22 LAB
ALBUMIN SERPL BCP-MCNC: 3.6 G/DL (ref 3.5–5.2)
ALP SERPL-CCNC: 53 U/L (ref 55–135)
ALT SERPL W/O P-5'-P-CCNC: 12 U/L (ref 10–44)
ANION GAP SERPL CALC-SCNC: 10 MMOL/L (ref 8–16)
AST SERPL-CCNC: 13 U/L (ref 10–40)
BASOPHILS # BLD AUTO: 0.07 K/UL (ref 0–0.2)
BASOPHILS NFR BLD: 0.7 % (ref 0–1.9)
BILIRUB SERPL-MCNC: 0.3 MG/DL (ref 0.1–1)
BUN SERPL-MCNC: 11 MG/DL (ref 6–20)
CALCIUM SERPL-MCNC: 8.9 MG/DL (ref 8.7–10.5)
CHLORIDE SERPL-SCNC: 108 MMOL/L (ref 95–110)
CO2 SERPL-SCNC: 22 MMOL/L (ref 23–29)
CREAT SERPL-MCNC: 0.9 MG/DL (ref 0.5–1.4)
DIFFERENTIAL METHOD: NORMAL
EOSINOPHIL # BLD AUTO: 0.1 K/UL (ref 0–0.5)
EOSINOPHIL NFR BLD: 1.1 % (ref 0–8)
ERYTHROCYTE [DISTWIDTH] IN BLOOD BY AUTOMATED COUNT: 13.4 % (ref 11.5–14.5)
EST. GFR  (NO RACE VARIABLE): >60 ML/MIN/1.73 M^2
GLUCOSE SERPL-MCNC: 76 MG/DL (ref 70–110)
HCT VFR BLD AUTO: 40.2 % (ref 37–48.5)
HGB BLD-MCNC: 13.1 G/DL (ref 12–16)
IMM GRANULOCYTES # BLD AUTO: 0.02 K/UL (ref 0–0.04)
IMM GRANULOCYTES NFR BLD AUTO: 0.2 % (ref 0–0.5)
LYMPHOCYTES # BLD AUTO: 3 K/UL (ref 1–4.8)
LYMPHOCYTES NFR BLD: 32.1 % (ref 18–48)
MCH RBC QN AUTO: 30.5 PG (ref 27–31)
MCHC RBC AUTO-ENTMCNC: 32.6 G/DL (ref 32–36)
MCV RBC AUTO: 94 FL (ref 82–98)
MONOCYTES # BLD AUTO: 0.5 K/UL (ref 0.3–1)
MONOCYTES NFR BLD: 5.7 % (ref 4–15)
NEUTROPHILS # BLD AUTO: 5.7 K/UL (ref 1.8–7.7)
NEUTROPHILS NFR BLD: 60.2 % (ref 38–73)
NRBC BLD-RTO: 0 /100 WBC
PLATELET # BLD AUTO: 292 K/UL (ref 150–450)
PMV BLD AUTO: 11.3 FL (ref 9.2–12.9)
POTASSIUM SERPL-SCNC: 4.2 MMOL/L (ref 3.5–5.1)
PROT SERPL-MCNC: 6.9 G/DL (ref 6–8.4)
RBC # BLD AUTO: 4.3 M/UL (ref 4–5.4)
SODIUM SERPL-SCNC: 140 MMOL/L (ref 136–145)
WBC # BLD AUTO: 9.4 K/UL (ref 3.9–12.7)

## 2023-06-22 PROCEDURE — 80053 COMPREHEN METABOLIC PANEL: CPT | Performed by: PSYCHIATRY & NEUROLOGY

## 2023-06-22 PROCEDURE — 80175 DRUG SCREEN QUAN LAMOTRIGINE: CPT | Performed by: PSYCHIATRY & NEUROLOGY

## 2023-06-22 PROCEDURE — 36415 COLL VENOUS BLD VENIPUNCTURE: CPT | Mod: PN | Performed by: PSYCHIATRY & NEUROLOGY

## 2023-06-22 PROCEDURE — 85025 COMPLETE CBC W/AUTO DIFF WBC: CPT | Performed by: PSYCHIATRY & NEUROLOGY

## 2023-06-26 ENCOUNTER — PATIENT MESSAGE (OUTPATIENT)
Dept: OBSTETRICS AND GYNECOLOGY | Facility: CLINIC | Age: 28
End: 2023-06-26
Payer: COMMERCIAL

## 2023-06-26 LAB — LAMOTRIGINE SERPL-MCNC: 2.9 UG/ML (ref 2–15)

## 2023-06-26 RX ORDER — LAMOTRIGINE 100 MG/1
350 TABLET ORAL 2 TIMES DAILY
Qty: 210 TABLET | Refills: 11 | Status: SHIPPED | OUTPATIENT
Start: 2023-06-26 | End: 2024-06-25

## 2023-07-12 ENCOUNTER — OFFICE VISIT (OUTPATIENT)
Dept: OBSTETRICS AND GYNECOLOGY | Facility: CLINIC | Age: 28
End: 2023-07-12
Payer: COMMERCIAL

## 2023-07-12 VITALS
SYSTOLIC BLOOD PRESSURE: 118 MMHG | WEIGHT: 278.69 LBS | BODY MASS INDEX: 51.28 KG/M2 | HEIGHT: 62 IN | DIASTOLIC BLOOD PRESSURE: 70 MMHG

## 2023-07-12 DIAGNOSIS — Z30.09 CONTRACEPTIVE EDUCATION: Primary | ICD-10-CM

## 2023-07-12 PROCEDURE — 3074F SYST BP LT 130 MM HG: CPT | Mod: CPTII,S$GLB,, | Performed by: OBSTETRICS & GYNECOLOGY

## 2023-07-12 PROCEDURE — 99999 PR PBB SHADOW E&M-EST. PATIENT-LVL III: ICD-10-PCS | Mod: PBBFAC,,, | Performed by: OBSTETRICS & GYNECOLOGY

## 2023-07-12 PROCEDURE — 3078F PR MOST RECENT DIASTOLIC BLOOD PRESSURE < 80 MM HG: ICD-10-PCS | Mod: CPTII,S$GLB,, | Performed by: OBSTETRICS & GYNECOLOGY

## 2023-07-12 PROCEDURE — 3078F DIAST BP <80 MM HG: CPT | Mod: CPTII,S$GLB,, | Performed by: OBSTETRICS & GYNECOLOGY

## 2023-07-12 PROCEDURE — 99999 PR PBB SHADOW E&M-EST. PATIENT-LVL III: CPT | Mod: PBBFAC,,, | Performed by: OBSTETRICS & GYNECOLOGY

## 2023-07-12 PROCEDURE — 3008F PR BODY MASS INDEX (BMI) DOCUMENTED: ICD-10-PCS | Mod: CPTII,S$GLB,, | Performed by: OBSTETRICS & GYNECOLOGY

## 2023-07-12 PROCEDURE — 3074F PR MOST RECENT SYSTOLIC BLOOD PRESSURE < 130 MM HG: ICD-10-PCS | Mod: CPTII,S$GLB,, | Performed by: OBSTETRICS & GYNECOLOGY

## 2023-07-12 PROCEDURE — 1159F MED LIST DOCD IN RCRD: CPT | Mod: CPTII,S$GLB,, | Performed by: OBSTETRICS & GYNECOLOGY

## 2023-07-12 PROCEDURE — 1159F PR MEDICATION LIST DOCUMENTED IN MEDICAL RECORD: ICD-10-PCS | Mod: CPTII,S$GLB,, | Performed by: OBSTETRICS & GYNECOLOGY

## 2023-07-12 PROCEDURE — 99213 OFFICE O/P EST LOW 20 MIN: CPT | Mod: S$GLB,,, | Performed by: OBSTETRICS & GYNECOLOGY

## 2023-07-12 PROCEDURE — 99213 PR OFFICE/OUTPT VISIT, EST, LEVL III, 20-29 MIN: ICD-10-PCS | Mod: S$GLB,,, | Performed by: OBSTETRICS & GYNECOLOGY

## 2023-07-12 PROCEDURE — 3008F BODY MASS INDEX DOCD: CPT | Mod: CPTII,S$GLB,, | Performed by: OBSTETRICS & GYNECOLOGY

## 2023-07-12 RX ORDER — NORELGESTROMIN AND ETHINYL ESTRADIOL 35; 150 UG/MG; UG/MG
1 PATCH TRANSDERMAL
Qty: 4 PATCH | Refills: 9 | Status: SHIPPED | OUTPATIENT
Start: 2023-07-12 | End: 2023-12-26

## 2023-07-12 NOTE — PROGRESS NOTES
Here to discuss options, Birth control     oral contraceptive pills causing low lamictal levels    Options reviewed.  Risks, Benefits and Alternatives to patch/ring or nexplanon, discused with patient in detail, all questions answered and patient agreed to proceed.  IUD as last resort      PLAN:  Low lamictal levels on oral contraceptive pills   - orthoevra patch trial, if complications - then recommended nuvaring trial  - kyleena IUD as last resort

## 2023-07-19 ENCOUNTER — PATIENT MESSAGE (OUTPATIENT)
Dept: NEUROLOGY | Facility: CLINIC | Age: 28
End: 2023-07-19
Payer: COMMERCIAL

## 2023-07-21 ENCOUNTER — PATIENT MESSAGE (OUTPATIENT)
Dept: NEUROLOGY | Facility: CLINIC | Age: 28
End: 2023-07-21
Payer: COMMERCIAL

## 2023-07-21 DIAGNOSIS — G40.909 NONINTRACTABLE EPILEPSY WITHOUT STATUS EPILEPTICUS, UNSPECIFIED EPILEPSY TYPE: Primary | ICD-10-CM

## 2023-07-24 ENCOUNTER — PATIENT MESSAGE (OUTPATIENT)
Dept: NEUROLOGY | Facility: CLINIC | Age: 28
End: 2023-07-24
Payer: COMMERCIAL

## 2023-08-03 ENCOUNTER — LAB VISIT (OUTPATIENT)
Dept: LAB | Facility: HOSPITAL | Age: 28
End: 2023-08-03
Attending: PSYCHIATRY & NEUROLOGY
Payer: COMMERCIAL

## 2023-08-03 DIAGNOSIS — G40.909 NONINTRACTABLE EPILEPSY WITHOUT STATUS EPILEPTICUS, UNSPECIFIED EPILEPSY TYPE: ICD-10-CM

## 2023-08-03 PROCEDURE — 36415 COLL VENOUS BLD VENIPUNCTURE: CPT | Mod: PN | Performed by: PSYCHIATRY & NEUROLOGY

## 2023-08-03 PROCEDURE — 80175 DRUG SCREEN QUAN LAMOTRIGINE: CPT | Performed by: PSYCHIATRY & NEUROLOGY

## 2023-08-07 LAB — LAMOTRIGINE SERPL-MCNC: 4.7 UG/ML (ref 2–15)

## 2023-10-04 ENCOUNTER — PATIENT MESSAGE (OUTPATIENT)
Dept: ADMINISTRATIVE | Facility: HOSPITAL | Age: 28
End: 2023-10-04
Payer: COMMERCIAL

## 2023-11-13 ENCOUNTER — TELEPHONE (OUTPATIENT)
Dept: NEUROLOGY | Facility: CLINIC | Age: 28
End: 2023-11-13
Payer: COMMERCIAL

## 2023-11-13 NOTE — TELEPHONE ENCOUNTER
Spoke to the pt, appt scheduled on 1/3/24 at 1530 with Dr. Salinas for sz.  Date, time and location discussed.

## 2023-11-17 ENCOUNTER — PATIENT MESSAGE (OUTPATIENT)
Dept: OBSTETRICS AND GYNECOLOGY | Facility: CLINIC | Age: 28
End: 2023-11-17
Payer: COMMERCIAL

## 2023-12-05 ENCOUNTER — OFFICE VISIT (OUTPATIENT)
Dept: DERMATOLOGY | Facility: CLINIC | Age: 28
End: 2023-12-05
Payer: COMMERCIAL

## 2023-12-05 DIAGNOSIS — D48.5 NEOPLASM OF UNCERTAIN BEHAVIOR OF SKIN: Primary | ICD-10-CM

## 2023-12-05 DIAGNOSIS — L71.9 ACNE ROSACEA: ICD-10-CM

## 2023-12-05 PROCEDURE — 99999 PR PBB SHADOW E&M-EST. PATIENT-LVL III: ICD-10-PCS | Mod: PBBFAC,,, | Performed by: DERMATOLOGY

## 2023-12-05 PROCEDURE — 11102 TANGNTL BX SKIN SINGLE LES: CPT | Mod: S$GLB,,, | Performed by: DERMATOLOGY

## 2023-12-05 PROCEDURE — 1159F PR MEDICATION LIST DOCUMENTED IN MEDICAL RECORD: ICD-10-PCS | Mod: CPTII,S$GLB,, | Performed by: DERMATOLOGY

## 2023-12-05 PROCEDURE — 99214 OFFICE O/P EST MOD 30 MIN: CPT | Mod: 25,S$GLB,, | Performed by: DERMATOLOGY

## 2023-12-05 PROCEDURE — 11102 PR TANGENTIAL BIOPSY, SKIN, SINGLE LESION: ICD-10-PCS | Mod: S$GLB,,, | Performed by: DERMATOLOGY

## 2023-12-05 PROCEDURE — 88305 TISSUE EXAM BY PATHOLOGIST: CPT | Mod: 26,,, | Performed by: PATHOLOGY

## 2023-12-05 PROCEDURE — 88305 TISSUE EXAM BY PATHOLOGIST: CPT | Mod: PO | Performed by: PATHOLOGY

## 2023-12-05 PROCEDURE — 99999 PR PBB SHADOW E&M-EST. PATIENT-LVL III: CPT | Mod: PBBFAC,,, | Performed by: DERMATOLOGY

## 2023-12-05 PROCEDURE — 1160F PR REVIEW ALL MEDS BY PRESCRIBER/CLIN PHARMACIST DOCUMENTED: ICD-10-PCS | Mod: CPTII,S$GLB,, | Performed by: DERMATOLOGY

## 2023-12-05 PROCEDURE — 99214 PR OFFICE/OUTPT VISIT, EST, LEVL IV, 30-39 MIN: ICD-10-PCS | Mod: 25,S$GLB,, | Performed by: DERMATOLOGY

## 2023-12-05 PROCEDURE — 1159F MED LIST DOCD IN RCRD: CPT | Mod: CPTII,S$GLB,, | Performed by: DERMATOLOGY

## 2023-12-05 PROCEDURE — 88305 TISSUE EXAM BY PATHOLOGIST: ICD-10-PCS | Mod: 26,,, | Performed by: PATHOLOGY

## 2023-12-05 PROCEDURE — 1160F RVW MEDS BY RX/DR IN RCRD: CPT | Mod: CPTII,S$GLB,, | Performed by: DERMATOLOGY

## 2023-12-05 NOTE — PROGRESS NOTES
Subjective:      Patient ID:  Terra Hamilton is a 27 y.o. female who presents for   Chief Complaint   Patient presents with    Skin Check     Changing mole at left neck     HPI  New Patient.   Here today for concerns of changing mole at left neck.  Notes mole seems to getting larger.  Redness and bumps to face, years. Previously tx with tazorac, clindagel, BP wash     Denies PHx skin cancer  +FHx skin cancer paternal grandfather, unsure of type    Review of Systems   Constitutional:  Negative for fever and chills.   Skin:  Negative for itching and rash.       Objective:   Physical Exam   Constitutional: She appears well-developed and well-nourished.   HENT:   Head:       Eyes: Lids are normal.  No conjunctival no injection.   Cardiovascular:  There is no local extremity swelling and no dependent edema.             Neurological: She is alert and oriented to person, place, and time.        Diagram Legend     Erythematous scaling macule/papule c/w actinic keratosis       Vascular papule c/w angioma      Pigmented verrucoid papule/plaque c/w seborrheic keratosis      Yellow umbilicated papule c/w sebaceous hyperplasia      Irregularly shaped tan macule c/w lentigo     1-2 mm smooth white papules consistent with Milia      Movable subcutaneous cyst with punctum c/w epidermal inclusion cyst      Subcutaneous movable cyst c/w pilar cyst      Firm pink to brown papule c/w dermatofibroma      Pedunculated fleshy papule(s) c/w skin tag(s)      Evenly pigmented macule c/w junctional nevus     Mildly variegated pigmented, slightly irregular-bordered macule c/w mildly atypical nevus      Flesh colored to evenly pigmented papule c/w intradermal nevus       Pink pearly papule/plaque c/w basal cell carcinoma      Erythematous hyperkeratotic cursted plaque c/w SCC      Surgical scar with no sign of skin cancer recurrence      Open and closed comedones      Inflammatory papules and pustules      Verrucoid papule consistent  consistent with wart     Erythematous eczematous patches and plaques     Dystrophic onycholytic nail with subungual debris c/w onychomycosis     Umbilicated papule    Erythematous-base heme-crusted tan verrucoid plaque consistent with inflamed seborrheic keratosis     Erythematous Silvery Scaling Plaque c/w Psoriasis     See annotation      Assessment / Plan:      Pathology Orders:       Normal Orders This Visit    Specimen to Pathology, Dermatology     Comments:    Number of Specimens:->1  ------------------------->-------------------------  Spec 1 Procedure:->Biopsy  Spec 1 Clinical Impression:->nevus vs sk vs other  Spec 1 Source:->right jawline  Release to patient->Immediate    Questions:    Procedure Type: Dermatology and skin neoplasms    Number of Specimens: 1    ------------------------: -------------------------    Spec 1 Procedure: Biopsy    Spec 1 Clinical Impression: nevus vs sk vs other    Spec 1 Source: right jawline    Clinical Information: see photo    Release to patient: Immediate          Neoplasm of uncertain behavior of skin  -     Specimen to Pathology, Dermatology  Shave biopsy procedure note:    Shave biopsy performed after verbal consent including risk of infection, scar, recurrence, need for additional treatment of site. Area prepped with alcohol, anesthetized with approximately 1.0cc of 1% lidocaine with epinephrine. Lesional tissue shaved with razor blade. Hemostasis achieved with application of aluminum chloride followed by hyfrecation. No complications. Dressing applied. Wound care explained.      Acne rosacea    Rosacea triple cream sent to [facility]  Rosacea triggers discussed  Diligent sun protection  Eucerin redness relief moisturizer qhs           Follow up in about 2 months (around 2/5/2024).

## 2023-12-11 LAB
FINAL PATHOLOGIC DIAGNOSIS: NORMAL
GROSS: NORMAL
Lab: NORMAL
MICROSCOPIC EXAM: NORMAL

## 2023-12-26 ENCOUNTER — NURSE TRIAGE (OUTPATIENT)
Dept: ADMINISTRATIVE | Facility: CLINIC | Age: 28
End: 2023-12-26
Payer: COMMERCIAL

## 2023-12-26 ENCOUNTER — OFFICE VISIT (OUTPATIENT)
Dept: FAMILY MEDICINE | Facility: CLINIC | Age: 28
End: 2023-12-26
Payer: COMMERCIAL

## 2023-12-26 VITALS
RESPIRATION RATE: 16 BRPM | HEIGHT: 62 IN | SYSTOLIC BLOOD PRESSURE: 100 MMHG | BODY MASS INDEX: 50.73 KG/M2 | WEIGHT: 275.69 LBS | DIASTOLIC BLOOD PRESSURE: 62 MMHG

## 2023-12-26 DIAGNOSIS — L29.9 ITCHING: ICD-10-CM

## 2023-12-26 DIAGNOSIS — L50.9 HIVES: Primary | ICD-10-CM

## 2023-12-26 PROCEDURE — 1159F MED LIST DOCD IN RCRD: CPT | Mod: CPTII,S$GLB,, | Performed by: NURSE PRACTITIONER

## 2023-12-26 PROCEDURE — 3008F PR BODY MASS INDEX (BMI) DOCUMENTED: ICD-10-PCS | Mod: CPTII,S$GLB,, | Performed by: NURSE PRACTITIONER

## 2023-12-26 PROCEDURE — 3074F PR MOST RECENT SYSTOLIC BLOOD PRESSURE < 130 MM HG: ICD-10-PCS | Mod: CPTII,S$GLB,, | Performed by: NURSE PRACTITIONER

## 2023-12-26 PROCEDURE — 99999 PR PBB SHADOW E&M-EST. PATIENT-LVL III: ICD-10-PCS | Mod: PBBFAC,,, | Performed by: NURSE PRACTITIONER

## 2023-12-26 PROCEDURE — 3074F SYST BP LT 130 MM HG: CPT | Mod: CPTII,S$GLB,, | Performed by: NURSE PRACTITIONER

## 2023-12-26 PROCEDURE — 3078F DIAST BP <80 MM HG: CPT | Mod: CPTII,S$GLB,, | Performed by: NURSE PRACTITIONER

## 2023-12-26 PROCEDURE — 1160F PR REVIEW ALL MEDS BY PRESCRIBER/CLIN PHARMACIST DOCUMENTED: ICD-10-PCS | Mod: CPTII,S$GLB,, | Performed by: NURSE PRACTITIONER

## 2023-12-26 PROCEDURE — 99999 PR PBB SHADOW E&M-EST. PATIENT-LVL III: CPT | Mod: PBBFAC,,, | Performed by: NURSE PRACTITIONER

## 2023-12-26 PROCEDURE — 99213 OFFICE O/P EST LOW 20 MIN: CPT | Mod: S$GLB,,, | Performed by: NURSE PRACTITIONER

## 2023-12-26 PROCEDURE — 1159F PR MEDICATION LIST DOCUMENTED IN MEDICAL RECORD: ICD-10-PCS | Mod: CPTII,S$GLB,, | Performed by: NURSE PRACTITIONER

## 2023-12-26 PROCEDURE — 1160F RVW MEDS BY RX/DR IN RCRD: CPT | Mod: CPTII,S$GLB,, | Performed by: NURSE PRACTITIONER

## 2023-12-26 PROCEDURE — 3078F PR MOST RECENT DIASTOLIC BLOOD PRESSURE < 80 MM HG: ICD-10-PCS | Mod: CPTII,S$GLB,, | Performed by: NURSE PRACTITIONER

## 2023-12-26 PROCEDURE — 3008F BODY MASS INDEX DOCD: CPT | Mod: CPTII,S$GLB,, | Performed by: NURSE PRACTITIONER

## 2023-12-26 PROCEDURE — 99213 PR OFFICE/OUTPT VISIT, EST, LEVL III, 20-29 MIN: ICD-10-PCS | Mod: S$GLB,,, | Performed by: NURSE PRACTITIONER

## 2023-12-26 RX ORDER — HYDROXYZINE HYDROCHLORIDE 25 MG/1
25 TABLET, FILM COATED ORAL 3 TIMES DAILY PRN
Qty: 21 TABLET | Refills: 0 | Status: SHIPPED | OUTPATIENT
Start: 2023-12-26 | End: 2025-01-03

## 2023-12-26 RX ORDER — METHYLPREDNISOLONE 4 MG/1
TABLET ORAL
Qty: 21 EACH | Refills: 0 | Status: SHIPPED | OUTPATIENT
Start: 2023-12-26 | End: 2024-01-16

## 2023-12-26 NOTE — PROGRESS NOTES
Assessment and Plan  Terra was seen today for urticaria.    Diagnoses and all orders for this visit:    Hives  -     methylPREDNISolone (MEDROL DOSEPACK) 4 mg tablet; use as directed    Itching  -     hydrOXYzine HCL (ATARAX) 25 MG tablet; Take 1 tablet (25 mg total) by mouth 3 (three) times daily as needed for Itching.      Treat symptoms as discussed.   Will refer for allergy testing if needed.  _Follow up in about 4 weeks (around 1/23/2024) for Annual with PCP, may return sooner for any concerns. _____________________________________________________________________  Subjective:    Chief Complaint:  Hives    HPI:  Terra is a 28 y.o. year old female here for hives    States woke up itching yesterday. Noticed hives yesterday evening.     Reports hives present on arms, legs and trunk.    No changes to soaps, detergents, perfumes or lotions.    No new medications    No previous occurences    Believes she may be have mild allergy to chocolate. Concerned that she may need allergy testing.      Medications:  Current Outpatient Medications on File Prior to Visit   Medication Sig Dispense Refill    albuterol (VENTOLIN HFA) 90 mcg/actuation inhaler Inhale 2 puffs into the lungs every 6 (six) hours as needed for Wheezing. Rescue 18 g 0    lamoTRIgine (LAMICTAL) 100 MG tablet Take 3.5 tablets (350 mg total) by mouth 2 (two) times daily. 210 tablet 11    multivit-min/ferrous fumarate (MULTI VITAMIN ORAL) Take 1 Dose by mouth once daily.      ondansetron (ZOFRAN-ODT) 4 MG TbDL Take 1 tablet (4 mg total) by mouth 2 (two) times daily as needed (nausea). 20 tablet 0    [DISCONTINUED] norethindrone ac-eth estradioL (LOESTRIN 1.5/30, 21,) 1.5-30 mg-mcg Tab Take 1 tablet by mouth once daily. 30 each 12    [DISCONTINUED] norelgestromin-ethinyl estradiol 150-35 mcg/24 hr Place 1 patch onto the skin every 7 days. 4 patch 9     No current facility-administered medications on file prior to visit.       Review of Systems:  Review of  "Systems   Constitutional:  Negative for fatigue and unexpected weight change.   HENT:  Negative for congestion, postnasal drip and rhinorrhea.    Eyes:  Negative for visual disturbance.   Respiratory:  Negative for cough and shortness of breath.    Cardiovascular:  Negative for chest pain.   Gastrointestinal:  Negative for constipation, diarrhea, nausea and vomiting.   Genitourinary:  Negative for difficulty urinating and dysuria.   Musculoskeletal:  Negative for arthralgias and back pain.   Skin:  Positive for rash.   Neurological:  Negative for dizziness and headaches.       Past Medical History:  Past Medical History:   Diagnosis Date    Focal seizures     History of polycystic ovarian disease     Seizures     2013    Speech delay        Objective:    Vitals:  Vitals:    12/26/23 1246   BP: 100/62   Resp: 16   Weight: 125 kg (275 lb 11 oz)   Height: 5' 2" (1.575 m)       Physical Exam  Vitals and nursing note reviewed.   Constitutional:       General: She is not in acute distress.     Appearance: Normal appearance. She is obese.   HENT:      Head: Normocephalic and atraumatic.      Nose: Nose normal. No rhinorrhea.      Mouth/Throat:      Mouth: Mucous membranes are moist. No angioedema.      Pharynx: Oropharynx is clear. Uvula midline. No pharyngeal swelling, posterior oropharyngeal erythema or uvula swelling.   Eyes:      General: No scleral icterus.     Conjunctiva/sclera: Conjunctivae normal.   Neck:      Thyroid: No thyromegaly.   Cardiovascular:      Rate and Rhythm: Normal rate and regular rhythm.      Heart sounds: Normal heart sounds.   Pulmonary:      Effort: Pulmonary effort is normal. No respiratory distress.      Breath sounds: Normal breath sounds.   Abdominal:      Tenderness: There is no abdominal tenderness.   Musculoskeletal:         General: Normal range of motion.      Cervical back: Normal range of motion and neck supple.      Right lower leg: No edema.      Left lower leg: No edema. "   Lymphadenopathy:      Cervical: No cervical adenopathy.   Skin:     General: Skin is warm and dry.      Capillary Refill: Capillary refill takes less than 2 seconds.      Findings: Rash present. Rash is urticarial.      Comments: Urticarial patches noted to bilateral lower extremities, lateral aspect of left arm and abdomen   Neurological:      General: No focal deficit present.      Mental Status: She is alert and oriented to person, place, and time.   Psychiatric:         Mood and Affect: Mood normal.         Behavior: Behavior normal.         Thought Content: Thought content normal.         Data:     Medical history reviewed, Medications reconciled.     Advised on symptomatic treatment.  Advised on emergent conditions    Discussed possibility of symptom reoccurrence and will referring to allergist if needed.     Side effects and precautions of medication use reviewed with patient, expressed understanding. No questions or concerns     BRIDGET Lechuga  Family Medicine

## 2023-12-26 NOTE — TELEPHONE ENCOUNTER
Pt calling with c/o allergic reaction and hives and doesn't know what she is reacting too. Pt triaged and she would like to be seen today. Pt triaged and appt made this afternoon. Care advice to be seen in office. Pt to go to the ED if any trouble breathing or any other questions or concerns to call us back.                  Reason for Disposition   Patient wants to be seen    Additional Information   Negative: Difficulty breathing or wheezing now   Negative: Rapid onset of swollen tongue   Negative: Rapid onset of hoarseness or cough   Negative: Very weak (can't stand)   Negative: Difficult to awaken or acting confused (e.g., disoriented, slurred speech)   Negative: Life-threatening reaction (anaphylaxis) in the past to similar substance (e.g., food, insect bite/sting, chemical, etc.) and < 2 hours since exposure   Negative: Sounds like a life-threatening emergency to the triager   Negative: Swollen tongue   Negative: Widespread hives, itching, or facial swelling and onset < 2 hours of exposure to high-risk allergen (e.g., 1st dose of antibiotic, nuts, sting)   Negative: Patient sounds very sick or weak to the triager   Negative: MODERATE-SEVERE hives persist (i.e., hives interfere with normal activities or work) and taking antihistamine (e.g., Benadryl, Claritin) > 24 hours   Negative: Hives have become worse and taking oral steroids (e.g., prednisone) > 24 hours   Negative: Abdominal pain   Negative: Joint swelling   Negative: Fever    Protocols used: Hives-A-[place]

## 2023-12-28 ENCOUNTER — OFFICE VISIT (OUTPATIENT)
Dept: DERMATOLOGY | Facility: CLINIC | Age: 28
End: 2023-12-28
Payer: COMMERCIAL

## 2023-12-28 DIAGNOSIS — L73.0 ACNE KELOIDALIS NUCHAE: Primary | ICD-10-CM

## 2023-12-28 DIAGNOSIS — L73.9 FOLLICULITIS: ICD-10-CM

## 2023-12-28 DIAGNOSIS — L71.9 ROSACEA: ICD-10-CM

## 2023-12-28 PROCEDURE — 11901 INJECT SKIN LESIONS >7: CPT | Mod: S$GLB,,, | Performed by: DERMATOLOGY

## 2023-12-28 PROCEDURE — 1159F PR MEDICATION LIST DOCUMENTED IN MEDICAL RECORD: ICD-10-PCS | Mod: CPTII,S$GLB,, | Performed by: DERMATOLOGY

## 2023-12-28 PROCEDURE — 99999 PR PBB SHADOW E&M-EST. PATIENT-LVL II: CPT | Mod: PBBFAC,,, | Performed by: DERMATOLOGY

## 2023-12-28 PROCEDURE — 1159F MED LIST DOCD IN RCRD: CPT | Mod: CPTII,S$GLB,, | Performed by: DERMATOLOGY

## 2023-12-28 PROCEDURE — 11901 PR INJECTION, SKIN LESIONS, 8 OR MORE: ICD-10-PCS | Mod: S$GLB,,, | Performed by: DERMATOLOGY

## 2023-12-28 PROCEDURE — 99214 PR OFFICE/OUTPT VISIT, EST, LEVL IV, 30-39 MIN: ICD-10-PCS | Mod: 25,S$GLB,, | Performed by: DERMATOLOGY

## 2023-12-28 PROCEDURE — 99214 OFFICE O/P EST MOD 30 MIN: CPT | Mod: 25,S$GLB,, | Performed by: DERMATOLOGY

## 2023-12-28 PROCEDURE — 99999 PR PBB SHADOW E&M-EST. PATIENT-LVL II: ICD-10-PCS | Mod: PBBFAC,,, | Performed by: DERMATOLOGY

## 2023-12-28 RX ORDER — CLINDAMYCIN PHOSPHATE 11.9 MG/ML
SOLUTION TOPICAL
Qty: 60 ML | Refills: 6 | Status: SHIPPED | OUTPATIENT
Start: 2023-12-28

## 2023-12-28 RX ORDER — BETAMETHASONE VALERATE 0.1 %
LOTION (ML) TOPICAL
Qty: 60 ML | Refills: 4 | Status: SHIPPED | OUTPATIENT
Start: 2023-12-28

## 2023-12-28 NOTE — PROGRESS NOTES
Subjective:      Patient ID:  Terra Hamilton is a 28 y.o. female who presents for No chief complaint on file.    HPI  Established patient  Here today for scalp concerns.     Christmas day woke up with itching elbows and knees. Welt on knee. Bumps on shoulder later that day. Mom dx with hives. Entire body hives the next day (Tuesday). Took benadryl. Rx hydroxyzine and oral steroids (medrol dose pack). Just picked up Rx. Hasn't started yet.   No history of similar. No SOB  Suspects allergic to chocolate. Family history of this.     Denies PHx skin cancer  +FHx skin cancer paternal grandfather, unsure of type     Review of Systems   Constitutional:  Negative for fever and chills.   Skin:  Negative for itching and rash.     Review of Systems   Constitutional:  Negative for fever and chills.   Skin:  Positive for itching and rash.       Objective:   Physical Exam   Constitutional: She appears well-developed and well-nourished. No distress.   HENT:   Head:       Neurological: She is alert and oriented to person, place, and time. She is not disoriented.   Psychiatric: She has a normal mood and affect.   Skin:   Areas Examined (abnormalities noted in diagram):   Head / Face Inspection Performed  Neck Inspection Performed  Chest / Axilla Inspection Performed  Back Inspection Performed  RUE Inspected  LUE Inspection Performed       Diagram Legend     Erythematous scaling macule/papule c/w actinic keratosis       Vascular papule c/w angioma      Pigmented verrucoid papule/plaque c/w seborrheic keratosis      Yellow umbilicated papule c/w sebaceous hyperplasia      Irregularly shaped tan macule c/w lentigo     1-2 mm smooth white papules consistent with Milia      Movable subcutaneous cyst with punctum c/w epidermal inclusion cyst      Subcutaneous movable cyst c/w pilar cyst      Firm pink to brown papule c/w dermatofibroma      Pedunculated fleshy papule(s) c/w skin tag(s)      Evenly pigmented macule c/w junctional  nevus     Mildly variegated pigmented, slightly irregular-bordered macule c/w mildly atypical nevus      Flesh colored to evenly pigmented papule c/w intradermal nevus       Pink pearly papule/plaque c/w basal cell carcinoma      Erythematous hyperkeratotic cursted plaque c/w SCC      Surgical scar with no sign of skin cancer recurrence      Open and closed comedones      Inflammatory papules and pustules      Verrucoid papule consistent consistent with wart     Erythematous eczematous patches and plaques     Dystrophic onycholytic nail with subungual debris c/w onychomycosis     Umbilicated papule    Erythematous-base heme-crusted tan verrucoid plaque consistent with inflamed seborrheic keratosis     Erythematous Silvery Scaling Plaque c/w Psoriasis     See annotation      Assessment / Plan:        Acne keloidalis nuchae  -     triamcinolone acetonide injection 10 mg  Intralesional Kenalog 10mg/cc (1.5 cc total) injected into 13 lesions on the nape of neck today after obtaining verbal consent including risk of surrounding hypopigmentation. Patient tolerated procedure well.    Units: 2  NDC for Kenalog 10mg/cc:  8321-1804-41      Folliculitis  -     clindamycin (CLEOCIN T) 1 % external solution; Aaa qd posterior scalp  Dispense: 60 mL; Refill: 6  -     betamethasone valerate 0.1% (VALISONE) 0.1 % Lotn; Aaa qday  Dispense: 60 mL; Refill: 4    Rosacea    States much improved with rosacea triple cream and redness relief         Follow up in about 6 weeks (around 2/8/2024).

## 2023-12-31 ENCOUNTER — PATIENT MESSAGE (OUTPATIENT)
Dept: FAMILY MEDICINE | Facility: CLINIC | Age: 28
End: 2023-12-31
Payer: COMMERCIAL

## 2023-12-31 DIAGNOSIS — L50.9 HIVES: Primary | ICD-10-CM

## 2024-01-02 NOTE — TELEPHONE ENCOUNTER
I reviewed the pictures the patient sent I recommend continuing with symptomatic treatment and keeping food diary as discussed. I placed referral to allergist.

## 2024-01-03 ENCOUNTER — OFFICE VISIT (OUTPATIENT)
Dept: NEUROLOGY | Facility: CLINIC | Age: 29
End: 2024-01-03
Payer: COMMERCIAL

## 2024-01-03 VITALS
DIASTOLIC BLOOD PRESSURE: 86 MMHG | HEIGHT: 62 IN | BODY MASS INDEX: 50.81 KG/M2 | SYSTOLIC BLOOD PRESSURE: 126 MMHG | WEIGHT: 276.13 LBS | HEART RATE: 101 BPM

## 2024-01-03 DIAGNOSIS — E66.01 CLASS 3 SEVERE OBESITY WITH SERIOUS COMORBIDITY AND BODY MASS INDEX (BMI) OF 50.0 TO 59.9 IN ADULT, UNSPECIFIED OBESITY TYPE: ICD-10-CM

## 2024-01-03 DIAGNOSIS — L50.9 HIVES: ICD-10-CM

## 2024-01-03 DIAGNOSIS — G40.909 NONINTRACTABLE EPILEPSY WITHOUT STATUS EPILEPTICUS, UNSPECIFIED EPILEPSY TYPE: Primary | ICD-10-CM

## 2024-01-03 PROBLEM — E66.813 CLASS 3 SEVERE OBESITY WITH SERIOUS COMORBIDITY AND BODY MASS INDEX (BMI) OF 50.0 TO 59.9 IN ADULT, UNSPECIFIED OBESITY TYPE: Status: ACTIVE | Noted: 2024-01-03

## 2024-01-03 PROCEDURE — 1160F RVW MEDS BY RX/DR IN RCRD: CPT | Mod: CPTII,S$GLB,, | Performed by: PSYCHIATRY & NEUROLOGY

## 2024-01-03 PROCEDURE — 99214 OFFICE O/P EST MOD 30 MIN: CPT | Mod: S$GLB,,, | Performed by: PSYCHIATRY & NEUROLOGY

## 2024-01-03 PROCEDURE — 3008F BODY MASS INDEX DOCD: CPT | Mod: CPTII,S$GLB,, | Performed by: PSYCHIATRY & NEUROLOGY

## 2024-01-03 PROCEDURE — 3079F DIAST BP 80-89 MM HG: CPT | Mod: CPTII,S$GLB,, | Performed by: PSYCHIATRY & NEUROLOGY

## 2024-01-03 PROCEDURE — 99999 PR PBB SHADOW E&M-EST. PATIENT-LVL III: CPT | Mod: PBBFAC,,, | Performed by: PSYCHIATRY & NEUROLOGY

## 2024-01-03 PROCEDURE — 3074F SYST BP LT 130 MM HG: CPT | Mod: CPTII,S$GLB,, | Performed by: PSYCHIATRY & NEUROLOGY

## 2024-01-03 PROCEDURE — 1159F MED LIST DOCD IN RCRD: CPT | Mod: CPTII,S$GLB,, | Performed by: PSYCHIATRY & NEUROLOGY

## 2024-01-03 NOTE — PROGRESS NOTES
"  Date: 1/3/2024    Patient ID: Terra Hamilton is a 28 y.o. female.    Chief Complaint: Seizures      History of Present Illness:  Ms. Hamilton is a 28 y.o. female who presents for followup of epilepsy.      Interval history: Since our last visit, she continues on lamictal 350 mg BID. Level in  Aug 2023 was 4.7.     She was on the pill and lamictal level was low. The patch did not stick well.      She had a staring spell concerning for a possible seizure in July 20, 2023. No further seizure like episodes since that time.     Since Christmas, she has been having intermittent itchy hives that are coming and going. She has been on steroids and atarax.     Seizure history:  She had onset of seizures in 2010/2011. She thinks that 2007 was actually her first seizure though. She recalls being in class and "blanked out". She woke up to seeing everyone move around to go to another class. In 7th and 8th grade, her friends would notice that she was zoning out. She recalls that she would feel like her heart dropped into her stomach and she would see white. She knew it was happening in the moment and couldn't talk or do anything about it. She would lose awareness. Her mother noted that one pupil would get big and one small. She would just stop motionless. No eye fluttering, lip smacking, or automatisms. She has never had a convulsion. She notices lack of sleep and stress as triggers. Strobe lights bother her.      When she was diagnosed, she was started on lamictal and ethosuximide at the same time. She has never been on any other medications. She was followed by Dr. Mcguire and then Dr. Elizabeth. EEG in the [facility] system in 2014 showed "generalized burst of epileptic activity".      Her last seizure was about 6 years ago. She would have a numbing sensation travel up her head and she would feel disoriented in 2019. She states the numbness would come from both ears and then travel up the back of her head. This would last " "about 10-15 seconds. She felt a little dizzy briefly one time. Dr. Elizabeth felt this was a migraine. They last one of these that she had was about 1 month ago. Before that, it was in March.      She is on lamictal 200 mg BID. Her last lamictal level was years ago. She was told it was low. She was previously on ethosuximide as well but was weaned off that in November 2018. She notes her last EEG she still had "signs of seizures" so they stayed on the lamictal. This was in December 2018. She has never had a MRI brain.       Her maternal cousin has epilepsy--he has convulsions. She notes her parents told her she was a sick baby and she cried a lot. She had big tonsils and adenoids and they were told she did not get adequate oxygen to the brain. She had a minor concussion with softball hitting her head in high school. Her mother also said as a baby she stared so wonders if she was having seizures as a baby too.        Allergies:  Review of patient's allergies indicates:   Allergen Reactions    Benadryl [diphenhydramine hcl]      Lowers seizure threshold    Quinolones      Lowers seizure threshold    Tramadol      Lowers seizure threshold    Wellbutrin [bupropion hcl]      Lowers seizure threshold    Chocolate Rash       Current Medications:  Current Outpatient Medications   Medication Sig Dispense Refill    albuterol (VENTOLIN HFA) 90 mcg/actuation inhaler Inhale 2 puffs into the lungs every 6 (six) hours as needed for Wheezing. Rescue 18 g 0    betamethasone valerate 0.1% (VALISONE) 0.1 % Lotn Aaa qday 60 mL 4    clindamycin (CLEOCIN T) 1 % external solution Aaa qd posterior scalp 60 mL 6    hydrOXYzine HCL (ATARAX) 25 MG tablet Take 1 tablet (25 mg total) by mouth 3 (three) times daily as needed for Itching. 21 tablet 0    lamoTRIgine (LAMICTAL) 100 MG tablet Take 3.5 tablets (350 mg total) by mouth 2 (two) times daily. 210 tablet 11    multivit-min/ferrous fumarate (MULTI VITAMIN ORAL) Take 1 Dose by mouth once daily. "      ondansetron (ZOFRAN-ODT) 4 MG TbDL Take 1 tablet (4 mg total) by mouth 2 (two) times daily as needed (nausea). 20 tablet 0    methylPREDNISolone (MEDROL DOSEPACK) 4 mg tablet use as directed 21 each 0     Current Facility-Administered Medications   Medication Dose Route Frequency Provider Last Rate Last Admin    triamcinolone acetonide injection 10 mg  10 mg Intradermal 1 time in Clinic/HOD Michaela Caldwell MD           Past Medical History:  Past Medical History:   Diagnosis Date    Focal seizures     History of polycystic ovarian disease     Seizures     2013    Speech delay        Past Surgical History:  Past Surgical History:   Procedure Laterality Date    BUNIONECTOMY Right 6/1/2018    Procedure: BUNIONECTOMY;  Surgeon: Lm Obrien DPM;  Location: [facility];  Service: Podiatry;  Laterality: Right;    CORRECTION OF HAMMER TOE Right 6/1/2018    Procedure: CORRECTION, HAMMER TOE;  Surgeon: Lm Obrien DPM;  Location: [facility] OR;  Service: Podiatry;  Laterality: Right;    FOOT ARTHROPLASTY Right 6/1/2018    Procedure: orif subtalar joint with Hyprocure implant ;  Surgeon: Lm Obrien DPM;  Location: [facility];  Service: Podiatry;  Laterality: Right;    FOOT SURGERY      TONSILLECTOMY, ADENOIDECTOMY, BILATERAL MYRINGOTOMY AND TUBES      WISDOM TOOTH EXTRACTION         Family History:  family history includes Anemia in her sister; Arthritis in her mother; Asthma in her sister; Breast cancer in her paternal grandmother; Cholelithiasis in her mother; Diabetes in her paternal grandfather; Endometriosis in her sister; Heart disease in her maternal grandfather and mother; Hypertension in her father, maternal grandmother, and mother; Irritable bowel syndrome in her maternal aunt; Myasthenia gravis in her paternal grandfather and paternal grandmother; Ovarian cancer in an other family member; Ulcerative colitis in her maternal uncle.    Social History:   reports that she has never smoked. She has  "never used smokeless tobacco. She reports current alcohol use. She reports that she does not use drugs.    Physical Exam:  Vitals:    01/03/24 1528   BP: 126/86   Pulse: 101   Weight: 125.3 kg (276 lb 2 oz)   Height: 5' 2" (1.575 m)   PainSc: 0-No pain     Body mass index is 50.5 kg/m².    Neurological Exam:  Mental status: Awake and alert  Speech language: No dysarthria or aphasia on conversation  Cranial nerves: Face symmetric  Motor: Moves all extremities well  Coordination: No ataxia. No tremor.      Data:  I have personally reviewed other provider's notes, labs, & imaging made available to me today.     Labs:  CBC:   Lab Results   Component Value Date    WBC 9.40 06/22/2023    HGB 13.1 06/22/2023    HCT 40.2 06/22/2023     06/22/2023    MCV 94 06/22/2023    RDW 13.4 06/22/2023     BMP:   Lab Results   Component Value Date     06/22/2023    K 4.2 06/22/2023     06/22/2023    CO2 22 (L) 06/22/2023    BUN 11 06/22/2023    CREATININE 0.9 06/22/2023    GLU 76 06/22/2023    CALCIUM 8.9 06/22/2023    MG 1.9 06/01/2021     LFTS;   Lab Results   Component Value Date    PROT 6.9 06/22/2023    ALBUMIN 3.6 06/22/2023    BILITOT 0.3 06/22/2023    AST 13 06/22/2023    ALKPHOS 53 (L) 06/22/2023    ALT 12 06/22/2023     COAGS:   Lab Results   Component Value Date    INR 1.0 08/12/2019     FLP:   Lab Results   Component Value Date    CHOL 219 (H) 01/22/2021    HDL 53 01/22/2021    LDLCALC 134.0 01/22/2021    TRIG 160 (H) 01/22/2021    CHOLHDL 24.2 01/22/2021         Assessment and Plan:  Ms. Hamilton is a 28 y.o. female here for followup of seizure disorder. No seizures. Will continue lamictal 350 mg BID.     As far as birth control options, mirena, copper IUD, progesterone only pills of Slynd would be OK with lamictal. We would just need to monitor level.     BMI noted to be in obese range. Lamictal is weight neutral so should not affect this. Followup with primary care to work on weight loss.     She will " be seeing allergy next week for hives. Advised against benadryl given the potential to provoke seizures.     Nonintractable epilepsy without status epilepticus, unspecified epilepsy type    Class 3 severe obesity with serious comorbidity and body mass index (BMI) of 50.0 to 59.9 in adult, unspecified obesity type    Hives

## 2024-01-14 ENCOUNTER — PATIENT MESSAGE (OUTPATIENT)
Dept: NEUROLOGY | Facility: CLINIC | Age: 29
End: 2024-01-14
Payer: COMMERCIAL

## 2024-01-17 ENCOUNTER — PATIENT MESSAGE (OUTPATIENT)
Dept: DERMATOLOGY | Facility: CLINIC | Age: 29
End: 2024-01-17
Payer: COMMERCIAL

## 2024-01-25 ENCOUNTER — OFFICE VISIT (OUTPATIENT)
Dept: ENDOCRINOLOGY | Facility: CLINIC | Age: 29
End: 2024-01-25
Payer: COMMERCIAL

## 2024-01-25 DIAGNOSIS — L50.9 URTICARIA: Primary | ICD-10-CM

## 2024-01-25 DIAGNOSIS — R79.89 LOW VITAMIN D LEVEL: ICD-10-CM

## 2024-01-25 DIAGNOSIS — E66.01 CLASS 3 SEVERE OBESITY WITH SERIOUS COMORBIDITY AND BODY MASS INDEX (BMI) OF 50.0 TO 59.9 IN ADULT, UNSPECIFIED OBESITY TYPE: ICD-10-CM

## 2024-01-25 DIAGNOSIS — L50.9 HIVES: ICD-10-CM

## 2024-01-25 PROCEDURE — 99204 OFFICE O/P NEW MOD 45 MIN: CPT | Mod: 95,,, | Performed by: INTERNAL MEDICINE

## 2024-01-25 NOTE — PROGRESS NOTES
Subjective:      Patient ID: Terra Hamilton is a 28 y.o. female.    Chief Complaint:  No chief complaint on file.      History of Present Illness    Terra Hamilton is here for evaluation and management of irregular menstrual cycles.  12/25 developed hives, seen by allergist. Woke up in the orning with generalized pruritus, developed rash later that night. Treated with steroids, initially responded. However, on day #4 of her taper hives reoccurred. Hives did not respond to benadryl.   1/16 - hives worsened. Again treated with steroids which she ended one week ago.     Started zolair    Concerned she may have thyroid related hives and would like further evaluation.     Denies hypothyroidism in the past.     Currently feeling well.  Cold intolerance during time of development of hives.   Does not typically nap but a few weeks ago developed acute onset fatigue. Denies any current symptoms depressed mood, fatigue.  Constipation has improved.    Denies any symptoms of over-treatment including, palpitations, difficulty getting to sleep, restlessness, anxiety or irritability.     Denies neck enlargement, hoarseness, dysphagia    Menstrual cycles occur every 28 - 32 days, menses 8 dys  Used OCPs from 3/2023 - 6/2023 --> stopped due to interaction with seizure medication    New medications: OTC zyzal and pepcid --> hives have not improved; required steroids for three weeks  Supplements: MVI daily   Biotin: denies     Denies amiodarone, lithium use or contrast exposure in the past six weeks.     PMHx:   PCOS diagnosed at age 13    Review of Systems  Denies recent illness    Objective:   Physical Exam  There were no vitals filed for this visit.    BP Readings from Last 3 Encounters:   01/16/24 113/74   01/05/24 124/80   01/03/24 126/86     Wt Readings from Last 1 Encounters:   01/16/24 0639 125 kg (275 lb 9.2 oz)         There is no height or weight on file to calculate BMI.    Lab Review:   Lab Results    Component Value Date    HGBA1C 4.9 06/01/2021     Lab Results   Component Value Date    CHOL 219 (H) 01/22/2021    HDL 53 01/22/2021    LDLCALC 134.0 01/22/2021    TRIG 160 (H) 01/22/2021    CHOLHDL 24.2 01/22/2021     Lab Results   Component Value Date     01/08/2024    K 3.7 01/08/2024     01/08/2024    CO2 25 01/08/2024    GLU 79 01/08/2024    BUN 14 01/08/2024    CREATININE 0.95 01/08/2024    CALCIUM 9.4 01/08/2024    PROT 7.2 01/08/2024    ALBUMIN 4.5 01/08/2024    BILITOT 0.5 01/08/2024    ALKPHOS 69 01/08/2024    AST 21 01/08/2024    ALT 19 01/08/2024    ANIONGAP 12 01/08/2024    ESTGFRAFRICA >60.0 06/01/2021    EGFRNONAA >60.0 06/01/2021    TSH 0.496 01/08/2024         Assessment and Plan     Hives  Appears clinically euthyroid   No symptoms of thyrotoxicosis   Reviewed detailed labs   Antibody testing is negative   TFTs normal     Plan:  Repeat labs in 4 - 6 months  Sooner if feeling unwell    Class 3 severe obesity with serious comorbidity and body mass index (BMI) of 50.0 to 59.9 in adult, unspecified obesity type  Follow with PCP    Low vitamin D level  Start replacement at least 800 - 1000 IUs daily   Repeat in 4 - 6 months      The patient location is: home  The chief complaint leading to consultation is:     Visit type: audiovisual    Face to Face time with patient: 32 minutes of total time spent on the encounter, which includes face to face time and non-face to face time preparing to see the patient (eg, review of tests), Obtaining and/or reviewing separately obtained history, Documenting clinical information in the electronic or other health record, Independently interpreting results (not separately reported) and communicating results to the patient/family/caregiver, or Care coordination (not separately reported).         Each patient to whom he or she provides medical services by telemedicine is:  (1) informed of the relationship between the physician and patient and the respective  role of any other health care provider with respect to management of the patient; and (2) notified that he or she may decline to receive medical services by telemedicine and may withdraw from such care at any time.    Notes:

## 2024-01-25 NOTE — ASSESSMENT & PLAN NOTE
Appears clinically euthyroid   No symptoms of thyrotoxicosis   Reviewed detailed labs   Antibody testing is negative   TFTs normal     Plan:  Repeat labs in 4 - 6 months  Sooner if feeling unwell

## 2024-02-20 ENCOUNTER — OFFICE VISIT (OUTPATIENT)
Dept: DERMATOLOGY | Facility: CLINIC | Age: 29
End: 2024-02-20
Payer: COMMERCIAL

## 2024-02-20 DIAGNOSIS — L73.2 HIDRADENITIS SUPPURATIVA: Primary | ICD-10-CM

## 2024-02-20 DIAGNOSIS — L73.0 ACNE KELOIDALIS NUCHAE: ICD-10-CM

## 2024-02-20 DIAGNOSIS — L08.0 PYODERMA: ICD-10-CM

## 2024-02-20 PROCEDURE — 11900 INJECT SKIN LESIONS </W 7: CPT | Mod: S$GLB,,, | Performed by: DERMATOLOGY

## 2024-02-20 PROCEDURE — 99999 PR PBB SHADOW E&M-EST. PATIENT-LVL II: CPT | Mod: PBBFAC,,, | Performed by: DERMATOLOGY

## 2024-02-20 PROCEDURE — 1159F MED LIST DOCD IN RCRD: CPT | Mod: CPTII,S$GLB,, | Performed by: DERMATOLOGY

## 2024-02-20 PROCEDURE — 99213 OFFICE O/P EST LOW 20 MIN: CPT | Mod: 25,S$GLB,, | Performed by: DERMATOLOGY

## 2024-02-20 NOTE — PROGRESS NOTES
Subjective:      Patient ID:  Terra Hamilton is a 28 y.o. female who presents for No chief complaint on file.    HPI  Established Patient  Here for scalp f/u  LOV 12/2023 with Dr. Caldwell  Chronic urticaria, doing xolair shots with allergist, started right after prednisone ended. No episodes of hives for a week   C/O two cysts on each side of breast- similar lesions that have come and gone over the years. Tx with abx in past. Some have popped spontaneously and drained in past.   Unable to tolerate low dose aldactone previously   LFT elevation with isotretinoin in past    Denies PHx skin cancer  +FHx skin cancer paternal grandfather, unsure of type  Review of Systems   Constitutional:  Negative for fever and chills.   Skin:  Negative for itching and rash.       Objective:   Physical Exam   Constitutional: She appears well-developed and well-nourished. No distress.   Neurological: She is alert and oriented to person, place, and time. She is not disoriented.   Psychiatric: She has a normal mood and affect.   Skin:   Areas Examined (abnormalities noted in diagram):   Head / Face Inspection Performed  Neck Inspection Performed  Chest / Axilla Inspection Performed  Back Inspection Performed  RUE Inspected  LUE Inspection Performed                 Diagram Legend     Erythematous scaling macule/papule c/w actinic keratosis       Vascular papule c/w angioma      Pigmented verrucoid papule/plaque c/w seborrheic keratosis      Yellow umbilicated papule c/w sebaceous hyperplasia      Irregularly shaped tan macule c/w lentigo     1-2 mm smooth white papules consistent with Milia      Movable subcutaneous cyst with punctum c/w epidermal inclusion cyst      Subcutaneous movable cyst c/w pilar cyst      Firm pink to brown papule c/w dermatofibroma      Pedunculated fleshy papule(s) c/w skin tag(s)      Evenly pigmented macule c/w junctional nevus     Mildly variegated pigmented, slightly irregular-bordered macule c/w  mildly atypical nevus      Flesh colored to evenly pigmented papule c/w intradermal nevus       Pink pearly papule/plaque c/w basal cell carcinoma      Erythematous hyperkeratotic cursted plaque c/w SCC      Surgical scar with no sign of skin cancer recurrence      Open and closed comedones      Inflammatory papules and pustules      Verrucoid papule consistent consistent with wart     Erythematous eczematous patches and plaques     Dystrophic onycholytic nail with subungual debris c/w onychomycosis     Umbilicated papule    Erythematous-base heme-crusted tan verrucoid plaque consistent with inflamed seborrheic keratosis     Erythematous Silvery Scaling Plaque c/w Psoriasis     See annotation      Assessment / Plan:        Hidradenitis suppurativa  -     triamcinolone acetonide injection 10 mg  Intralesional Kenalog 10mg/cc (0.3 cc total) injected into 1 lesions on the left breast today after obtaining verbal consent including risk of surrounding hypopigmentation. Patient tolerated procedure well.    Units: 1  NDC for Kenalog 10mg/cc:  2798-2256-19  Start clinda bid  Start differin gel thrice weekly, ok to mix with moisturizer    Acne keloidalis nuchae    Intralesional Kenalog 10mg/cc (1.3 cc total) injected into 7 lesions on the scalp today after obtaining verbal consent including risk of surrounding hypopigmentation. Patient tolerated procedure well.    Units: 1  NDC for Kenalog 10mg/cc:  2921-3815-33    Pyoderma  Clinda bid   Bp wash          Follow up if symptoms worsen or fail to improve.

## 2024-02-27 ENCOUNTER — PATIENT MESSAGE (OUTPATIENT)
Dept: DERMATOLOGY | Facility: CLINIC | Age: 29
End: 2024-02-27
Payer: COMMERCIAL

## 2024-02-27 DIAGNOSIS — L73.2 HIDRADENITIS SUPPURATIVA: Primary | ICD-10-CM

## 2024-02-27 RX ORDER — DOXYCYCLINE HYCLATE 100 MG
TABLET ORAL
Qty: 20 TABLET | Refills: 0 | Status: SHIPPED | OUTPATIENT
Start: 2024-02-27

## 2024-02-27 NOTE — DISCHARGE INSTRUCTIONS
PRE-ADMIT TESTING -  [phone]    [address]  [facility]          Your surgery has been scheduled at [facility]. We are pleased to have the opportunity to serve you. For Further Information please call [phone].    On the day of surgery please report to the Information Desk on the 1st floor.    · CONTACT YOUR PHYSICIAN'S OFFICE THE DAY PRIOR TO YOUR SURGERY TO OBTAIN YOUR ARRIVAL TIME.     · The evening before surgery do not eat anything after 9 p.m. ( this includes hard candy, chewing gum and mints).  You may only have GATORADE, POWERADE AND WATER  from 9 p.m. until you leave your home.   DO NOT DRINK ANY LIQUIDS ON THE WAY TO THE HOSPITAL.      SPECIAL MEDICATION INSTRUCTIONS: TAKE medications checked off by the Anesthesiologist on your Medication List.    Angiogram Patients: Take medications as instructed by your physician, including aspirin.     Surgery Patients:    If you take ASPIRIN - Your PHYSICIAN/SURGEON will need to inform you IF/OR when you need to stop taking aspirin prior to your surgery.     Do Not take any medications containing IBUPROFEN.  Do Not Wear any make-up or dark nail polish   (especially eye make-up) to surgery. If you come to surgery with makeup on you will be required to remove the makeup or nail polish.    Do not shave your surgical area at least 5 days prior to your surgery. The surgical prep will be performed at the hospital according to Infection Control regulations.    Leave all valuables at home.   Do Not wear any jewelry or watches, including any metal in body piercings.  Contact Lens must be removed before surgery. Either do not wear the contact lens or bring a case and solution for storage.  Please bring a container for eyeglasses or dentures as required.  Bring any paperwork your physician has provided, such as consent forms,  history and physicals, doctor's orders, etc.   Bring comfortable clothes that are loose fitting to wear upon  discharge. Take into consideration the type of surgery being performed.  Maintain your diet as advised per your physician the day prior to surgery.      Adequate rest the night before surgery is advised.   Park in the Parking lot behind the hospital or in the [facility] [place] [facility] across the street from the parking lot. Parking is complimentary.  If you will be discharged the same day as your procedure, please arrange for a responsible adult to drive you home or to accompany you if traveling by taxi.   YOU WILL NOT BE PERMITTED TO DRIVE OR TO LEAVE THE HOSPITAL ALONE AFTER SURGERY.   It is strongly recommended that you arrange for someone to remain with you for the first 24 hrs following your surgery.       Thank you for your cooperation.  The Staff of [facility].        Bathing Instructions                                                                 Please shower the evening before and morning of your procedure with    ANTIBACTERIAL SOAP. ( DIAL, etc )  Concentrate on the surgical area   for at least 3 minutes and rinse completely. Dry off as usual.   Do not use any deodorant, powder, body lotions, perfume, after shave or    cologne.                                             Negative

## 2024-03-08 ENCOUNTER — PATIENT MESSAGE (OUTPATIENT)
Dept: NEUROLOGY | Facility: CLINIC | Age: 29
End: 2024-03-08
Payer: COMMERCIAL

## 2024-03-18 ENCOUNTER — OFFICE VISIT (OUTPATIENT)
Dept: OBSTETRICS AND GYNECOLOGY | Facility: CLINIC | Age: 29
End: 2024-03-18
Payer: COMMERCIAL

## 2024-03-18 VITALS
SYSTOLIC BLOOD PRESSURE: 100 MMHG | WEIGHT: 275.81 LBS | DIASTOLIC BLOOD PRESSURE: 62 MMHG | BODY MASS INDEX: 52.07 KG/M2 | HEIGHT: 61 IN

## 2024-03-18 DIAGNOSIS — Z01.419 WOMEN'S ANNUAL ROUTINE GYNECOLOGICAL EXAMINATION: Primary | ICD-10-CM

## 2024-03-18 PROCEDURE — 99999 PR PBB SHADOW E&M-EST. PATIENT-LVL III: CPT | Mod: PBBFAC,,, | Performed by: OBSTETRICS & GYNECOLOGY

## 2024-03-18 PROCEDURE — 3078F DIAST BP <80 MM HG: CPT | Mod: CPTII,S$GLB,, | Performed by: OBSTETRICS & GYNECOLOGY

## 2024-03-18 PROCEDURE — 88175 CYTOPATH C/V AUTO FLUID REDO: CPT | Performed by: OBSTETRICS & GYNECOLOGY

## 2024-03-18 PROCEDURE — 1159F MED LIST DOCD IN RCRD: CPT | Mod: CPTII,S$GLB,, | Performed by: OBSTETRICS & GYNECOLOGY

## 2024-03-18 PROCEDURE — 3008F BODY MASS INDEX DOCD: CPT | Mod: CPTII,S$GLB,, | Performed by: OBSTETRICS & GYNECOLOGY

## 2024-03-18 PROCEDURE — 3074F SYST BP LT 130 MM HG: CPT | Mod: CPTII,S$GLB,, | Performed by: OBSTETRICS & GYNECOLOGY

## 2024-03-18 PROCEDURE — 99395 PREV VISIT EST AGE 18-39: CPT | Mod: S$GLB,,, | Performed by: OBSTETRICS & GYNECOLOGY

## 2024-03-18 RX ORDER — NORETHINDRONE 0.35 MG/1
1 TABLET ORAL DAILY
Qty: 28 TABLET | Refills: 12 | Status: SHIPPED | OUTPATIENT
Start: 2024-03-18 | End: 2025-03-18

## 2024-03-18 NOTE — PROGRESS NOTES
Chief Complaint   Patient presents with    Well Woman       History and Physical:  Patient's last menstrual period was 2024 (exact date).       Terra Hamilton is a 28 y.o.   female who presents today for her routine annual GYN exam. The patient has no Gynecology complaints today. OCP decreasing seizure meds, counseled - will try progesterone only pill.       Allergies:   Review of patient's allergies indicates:   Allergen Reactions    Benadryl [diphenhydramine hcl]      Lowers seizure threshold    Quinolones      Lowers seizure threshold    Tramadol      Lowers seizure threshold    Wellbutrin [bupropion hcl]      Lowers seizure threshold    Chocolate Rash       Past Medical History:   Diagnosis Date    Focal seizures     History of polycystic ovarian disease     Seizures         Speech delay        Past Surgical History:   Procedure Laterality Date    BUNIONECTOMY Right 2018    Procedure: BUNIONECTOMY;  Surgeon: Lm Obrien DPM;  Location: [facility];  Service: Podiatry;  Laterality: Right;    CORRECTION OF HAMMER TOE Right 2018    Procedure: CORRECTION, HAMMER TOE;  Surgeon: Lm Obrien DPM;  Location: [facility];  Service: Podiatry;  Laterality: Right;    FOOT ARTHROPLASTY Right 2018    Procedure: orif subtalar joint with Hyprocure implant ;  Surgeon: Lm Obrien DPM;  Location: [facility];  Service: Podiatry;  Laterality: Right;    FOOT SURGERY      TONSILLECTOMY, ADENOIDECTOMY, BILATERAL MYRINGOTOMY AND TUBES      WISDOM TOOTH EXTRACTION         MEDS:   Current Outpatient Medications on File Prior to Visit   Medication Sig Dispense Refill    albuterol (VENTOLIN HFA) 90 mcg/actuation inhaler Inhale 2 puffs into the lungs every 6 (six) hours as needed for Wheezing. Rescue 18 g 0    betamethasone valerate 0.1% (VALISONE) 0.1 % Lotn Aaa qday 60 mL 4    clindamycin (CLEOCIN T) 1 % external solution Aaa qd posterior scalp 60 mL 6    doxycycline  "(VIBRA-TABS) 100 MG tablet 1 po bid with food, do not lay down for at least 1 hour after ingestion 20 tablet 0    EPINEPHrine (EPIPEN) 0.3 mg/0.3 mL AtIn Inject 0.6 mLs (0.6 mg total) into the muscle as needed. 2 each 0    famotidine (PEPCID) 20 MG tablet Take 1 tablet (20 mg total) by mouth 2 (two) times daily. 20 tablet 0    hydrOXYzine HCL (ATARAX) 25 MG tablet Take 1 tablet (25 mg total) by mouth 3 (three) times daily as needed for Itching. 21 tablet 0    lamoTRIgine (LAMICTAL) 100 MG tablet Take 3.5 tablets (350 mg total) by mouth 2 (two) times daily. 210 tablet 11    multivit-min/ferrous fumarate (MULTI VITAMIN ORAL) Take 1 Dose by mouth once daily.      ondansetron (ZOFRAN-ODT) 4 MG TbDL Take 1 tablet (4 mg total) by mouth 2 (two) times daily as needed (nausea). 20 tablet 0     Current Facility-Administered Medications on File Prior to Visit   Medication Dose Route Frequency Provider Last Rate Last Admin    triamcinolone acetonide injection 10 mg  10 mg Intradermal 1 time in Clinic/HOD Michaela Caldwell MD        triamcinolone acetonide injection 10 mg  10 mg Intradermal 1 time in Clinic/HOD Michaela Caldwell MD           OB History          0    Para   0    Term   0       0    AB   0    Living   0         SAB   0    IAB   0    Ectopic   0    Multiple   0    Live Births   0                 Social History     Socioeconomic History    Marital status: Single   Tobacco Use    Smoking status: Never    Smokeless tobacco: Never   Substance and Sexual Activity    Alcohol use: Yes     Comment: only on special occasions    Drug use: No    Sexual activity: Yes     Partners: Male     Birth control/protection: Condom   Social History Narrative    Lives with Mom, Dad.Sister is in college.1 dog, 1 cat, 1 rabbitIn 10th grade. School is "ok".     Social Determinants of Health     Financial Resource Strain: Low Risk  (2023)    Overall Financial Resource Strain (CARDIA)     Difficulty of Paying Living " Expenses: Not hard at all   Food Insecurity: No Food Insecurity (12/26/2023)    Hunger Vital Sign     Worried About Running Out of Food in the Last Year: Never true     Ran Out of Food in the Last Year: Never true   Transportation Needs: No Transportation Needs (12/26/2023)    PRAPARE - Transportation     Lack of Transportation (Medical): No     Lack of Transportation (Non-Medical): No   Physical Activity: Inactive (12/26/2023)    Exercise Vital Sign     Days of Exercise per Week: 0 days     Minutes of Exercise per Session: 0 min   Stress: No Stress Concern Present (12/26/2023)    [place] [employer] - Occupational Stress Questionnaire     Feeling of Stress : Not at all   Social Connections: Unknown (12/26/2023)    Social Connection and Isolation Panel [NHANES]     Frequency of Communication with Friends and Family: More than three times a week     Frequency of Social Gatherings with Friends and Family: Twice a week     Active Member of Clubs or Organizations: No     Attends Club or Organization Meetings: Never     Marital Status: Never    Housing Stability: Low Risk  (12/26/2023)    Housing Stability Vital Sign     Unable to Pay for Housing in the Last Year: No     Number of Places Lived in the Last Year: 1     Unstable Housing in the Last Year: No       Family History   Problem Relation Age of Onset    Hypertension Mother     Heart disease Mother     Arthritis Mother     Cholelithiasis Mother     Hypertension Father     Asthma Sister     Endometriosis Sister     Anemia Sister     Hypertension Maternal Grandmother     Heart disease Maternal Grandfather     Breast cancer Paternal Grandmother     Myasthenia gravis Paternal Grandmother     Myasthenia gravis Paternal Grandfather     Diabetes Paternal Grandfather     Irritable bowel syndrome Maternal Aunt     Ulcerative colitis Maternal Uncle     Ovarian cancer Other          Past medical and surgical history reviewed.   I have reviewed the  "patient's medical history in detail and updated the computerized patient record.    Review of System:   General: no chills, fever, night sweats, weight gain or weight loss  Psychological: no depression or suicidal ideation  Breasts: no new or changing breast lumps, nipple discharge or masses.  Respiratory: no cough, shortness of breath, or wheezing  Cardiovascular: no chest pain or dyspnea on exertion  Gastrointestinal: no abdominal pain, change in bowel habits, or black or bloody stools  Genito-Urinary: no incontinence, urinary frequency/urgency or vulvar/vaginal symptoms, pelvic pain or abnormal vaginal bleeding.  Musculoskeletal: no gait disturbance or muscular weakness      Physical Exam:   /62   Ht 5' 1" (1.549 m)   Wt 125.1 kg (275 lb 12.7 oz)   LMP 02/28/2024 (Exact Date)   BMI 52.11 kg/m²   Constitutional: She appears alert and responsive. She appears well-developed, well-groomed, and well-nourished. No distress. OverWeight   HENT:   Head: Normocephalic and atraumatic.   Eyes: Conjunctivae and EOM are normal. No scleral icterus.   Neck: Symmetrical. Normal range of motion. Neck supple. No tracheal deviation present.   Cardiovascular: Normal rate, no rhythm abnormality noted. Extremities without swelling or edema, warm.    Pulmonary/Chest: Normal respiratory Effort. No distress or retractions. She exhibits no tenderness.  Breasts: are symmetrical.   Right breast exhibits no inverted nipple, no mass, no nipple discharge, no skin change and no tenderness.   Left breast exhibits no inverted nipple, no mass, no nipple discharge, no skin change and no tenderness.  Abdominal: Soft. She exhibits no distension, hernias or masses. There is no tenderness. No enlargement of liver edge or spleen.  There is no rebound and no guarding.   Genitourinary:    External rectal exam shows no thrombosed external hemorrhoids, no lesions.     Pelvic exam was performed with patient supine.   No labial fusion, and " symmetrical.    There is no rash, lesion or injury on the right labia.   There is no rash, lesion or injury on the left labia.   No bleeding and no signs of injury around the vaginal introitus, urethral meatus is normal size and without prolapse or lesions, urethra well supported. The cervix is visualized with no discharge, lesions or friability.   No vaginal discharge found.    No significant Cystocele, Enterocele or rectocele, and cervix and uterus well supported.   Bimanual exam:   The urethra is normal to palpation and there are no palpable vaginal wall masses.   Uterus is not deviated, not enlarged, not fixed, normal shape and not tender.   Cervix exhibits no motion tenderness.    Right adnexum displays no mass or nodularity and no tenderness.   Left adnexum displays no mass or nodularity and no tenderness.  Musculoskeletal: Normal range of motion.   Neurological: She is alert and oriented to person, place, and time. Coordination normal.   Skin: Skin is warm and dry. She is not diaphoretic. No rashes, lesions or ulcers.   Psychiatric: She has a normal mood and affect, oriented to person, place, and time.      Assessment:   Normal annual GYN exam  Over weight    Plan:   PAP  norQD  Follow up in 1 year.  Patient informed will be contacted with results within 2 weeks. Encouraged to please call back or email if she has not heard from us by then.

## 2024-03-25 ENCOUNTER — PATIENT MESSAGE (OUTPATIENT)
Dept: OBSTETRICS AND GYNECOLOGY | Facility: CLINIC | Age: 29
End: 2024-03-25
Payer: COMMERCIAL

## 2024-04-16 ENCOUNTER — OFFICE VISIT (OUTPATIENT)
Dept: DERMATOLOGY | Facility: CLINIC | Age: 29
End: 2024-04-16
Payer: COMMERCIAL

## 2024-04-16 VITALS — WEIGHT: 275.81 LBS | HEIGHT: 61 IN | BODY MASS INDEX: 52.07 KG/M2 | RESPIRATION RATE: 18 BRPM

## 2024-04-16 DIAGNOSIS — L73.0 ACNE KELOIDALIS NUCHAE: Primary | ICD-10-CM

## 2024-04-16 PROCEDURE — 11900 INJECT SKIN LESIONS </W 7: CPT | Mod: S$GLB,,, | Performed by: DERMATOLOGY

## 2024-04-16 PROCEDURE — 99999 PR PBB SHADOW E&M-EST. PATIENT-LVL III: CPT | Mod: PBBFAC,,, | Performed by: DERMATOLOGY

## 2024-04-16 PROCEDURE — 99499 UNLISTED E&M SERVICE: CPT | Mod: S$GLB,,, | Performed by: DERMATOLOGY

## 2024-04-16 NOTE — PROGRESS NOTES
Subjective:      Patient ID:  Terra Hamilton is a 28 y.o. female who presents for   Chief Complaint   Patient presents with    Hair/Scalp Problem     HPI  Patient present for scalp follow up.   Pt states scalp issues are about the same.Also with HS, rx doxy after last visit when no improvement with topicals and ILK 10 to left breast cystic nodule  Topical Clinda, bp wash   Has clinda and valisone for scalp as well.   Differin gel to trunk    Chronic urticaria, doing xolair shots with allergist, started right after prednisone ended. No episodes of hives for a week   C/O two cysts on each side of breast- similar lesions that have come and gone over the years. Tx with abx in past. Some have popped spontaneously and drained in past.   Unable to tolerate low dose aldactone previously   LFT elevation with isotretinoin in past  Denies PHx skin cancer  +FHx skin cancer paternal grandfather, unsure of type    Past Medical History:   Diagnosis Date    Focal seizures     History of polycystic ovarian disease     Seizures     2013    Speech delay        Review of Systems   Constitutional:  Negative for fever and chills.   Skin:  Negative for itching and rash.       Objective:   Physical Exam   Constitutional: She appears well-developed and well-nourished. No distress.   Neurological: She is alert and oriented to person, place, and time. She is not disoriented.   Psychiatric: She has a normal mood and affect.   Skin:   Areas Examined (abnormalities noted in diagram):   Head / Face Inspection Performed  Neck Inspection Performed  RUE Inspected  LUE Inspection Performed            Diagram Legend     Erythematous scaling macule/papule c/w actinic keratosis       Vascular papule c/w angioma      Pigmented verrucoid papule/plaque c/w seborrheic keratosis      Yellow umbilicated papule c/w sebaceous hyperplasia      Irregularly shaped tan macule c/w lentigo     1-2 mm smooth white papules consistent with Milia      Movable  subcutaneous cyst with punctum c/w epidermal inclusion cyst      Subcutaneous movable cyst c/w pilar cyst      Firm pink to brown papule c/w dermatofibroma      Pedunculated fleshy papule(s) c/w skin tag(s)      Evenly pigmented macule c/w junctional nevus     Mildly variegated pigmented, slightly irregular-bordered macule c/w mildly atypical nevus      Flesh colored to evenly pigmented papule c/w intradermal nevus       Pink pearly papule/plaque c/w basal cell carcinoma      Erythematous hyperkeratotic cursted plaque c/w SCC      Surgical scar with no sign of skin cancer recurrence      Open and closed comedones      Inflammatory papules and pustules      Verrucoid papule consistent consistent with wart     Erythematous eczematous patches and plaques     Dystrophic onycholytic nail with subungual debris c/w onychomycosis     Umbilicated papule    Erythematous-base heme-crusted tan verrucoid plaque consistent with inflamed seborrheic keratosis     Erythematous Silvery Scaling Plaque c/w Psoriasis     See annotation      Assessment / Plan:        Terra was seen today for hair/scalp problem.    Diagnoses and all orders for this visit:    Acne keloidalis nuchae  -     triamcinolone acetonide injection 10 mg    Intralesional Kenalog 10mg/cc (1 cc total) injected into 7 lesions on the occipital scalp today after obtaining verbal consent including risk of surrounding hypopigmentation. Patient tolerated procedure well.    Units: 1  NDC for Kenalog 10mg/cc:  2514-0527-38           Follow up in about 2 months (around 6/16/2024).

## 2024-04-29 ENCOUNTER — PATIENT MESSAGE (OUTPATIENT)
Dept: DERMATOLOGY | Facility: CLINIC | Age: 29
End: 2024-04-29
Payer: COMMERCIAL

## 2024-05-01 ENCOUNTER — PATIENT MESSAGE (OUTPATIENT)
Dept: ENDOCRINOLOGY | Facility: CLINIC | Age: 29
End: 2024-05-01
Payer: COMMERCIAL

## 2024-05-02 ENCOUNTER — LAB VISIT (OUTPATIENT)
Dept: LAB | Facility: HOSPITAL | Age: 29
End: 2024-05-02
Attending: INTERNAL MEDICINE
Payer: COMMERCIAL

## 2024-05-02 DIAGNOSIS — L50.9 URTICARIA: ICD-10-CM

## 2024-05-02 DIAGNOSIS — E66.01 CLASS 3 SEVERE OBESITY WITH SERIOUS COMORBIDITY AND BODY MASS INDEX (BMI) OF 50.0 TO 59.9 IN ADULT, UNSPECIFIED OBESITY TYPE: ICD-10-CM

## 2024-05-02 PROCEDURE — 84443 ASSAY THYROID STIM HORMONE: CPT | Performed by: INTERNAL MEDICINE

## 2024-05-02 PROCEDURE — 84480 ASSAY TRIIODOTHYRONINE (T3): CPT | Performed by: INTERNAL MEDICINE

## 2024-05-02 PROCEDURE — 36415 COLL VENOUS BLD VENIPUNCTURE: CPT | Mod: PN | Performed by: INTERNAL MEDICINE

## 2024-05-02 PROCEDURE — 82306 VITAMIN D 25 HYDROXY: CPT | Performed by: INTERNAL MEDICINE

## 2024-05-02 PROCEDURE — 84439 ASSAY OF FREE THYROXINE: CPT | Performed by: INTERNAL MEDICINE

## 2024-05-03 LAB
25(OH)D3+25(OH)D2 SERPL-MCNC: 15 NG/ML (ref 30–96)
T3 SERPL-MCNC: 110 NG/DL (ref 60–180)
T4 FREE SERPL-MCNC: 0.89 NG/DL (ref 0.71–1.51)
TSH SERPL DL<=0.005 MIU/L-ACNC: 1 UIU/ML (ref 0.4–4)

## 2024-06-04 ENCOUNTER — OFFICE VISIT (OUTPATIENT)
Dept: DERMATOLOGY | Facility: CLINIC | Age: 29
End: 2024-06-04
Payer: COMMERCIAL

## 2024-06-04 DIAGNOSIS — Z87.2 HISTORY OF HIDRADENITIS SUPPURATIVA: ICD-10-CM

## 2024-06-04 DIAGNOSIS — L81.9 POSTINFLAMMATORY PIGMENTARY CHANGES: ICD-10-CM

## 2024-06-04 DIAGNOSIS — L73.0 ACNE KELOIDALIS NUCHAE: Primary | ICD-10-CM

## 2024-06-04 PROCEDURE — 99213 OFFICE O/P EST LOW 20 MIN: CPT | Mod: 25,S$GLB,, | Performed by: DERMATOLOGY

## 2024-06-04 PROCEDURE — 1159F MED LIST DOCD IN RCRD: CPT | Mod: CPTII,S$GLB,, | Performed by: DERMATOLOGY

## 2024-06-04 PROCEDURE — 99999 PR PBB SHADOW E&M-EST. PATIENT-LVL II: CPT | Mod: PBBFAC,,, | Performed by: DERMATOLOGY

## 2024-06-04 PROCEDURE — 11900 INJECT SKIN LESIONS </W 7: CPT | Mod: S$GLB,,, | Performed by: DERMATOLOGY

## 2024-06-04 NOTE — PROGRESS NOTES
Subjective:      Patient ID:  Terra Hamilton is a 28 y.o. female who presents for   Chief Complaint   Patient presents with    Hair/Scalp Problem     HPI  Patient present for scalp follow up.   Pt states scalp issues are improving. Also with HS, rx doxy after last visit when no improvement with topicals and ILK 10 to left breast cystic nodule  4% bp wash in shower to trunk.       Topical Clinda, bp wash   Has clinda and valisone for scalp as well.   Differin gel to trunk     Chronic urticaria, doing xolair shots with allergist, started right after prednisone ended. No episodes of hives for a week   C/O two cysts on each side of breast- similar lesions that have come and gone over the years. Tx with abx in past. Some have popped spontaneously and drained in past.   Unable to tolerate low dose aldactone previously   LFT elevation with isotretinoin in past  Denies PHx skin cancer  +FHx skin cancer paternal grandfather, unsure of type    Review of Systems   Constitutional:  Negative for fever and chills.   Skin:  Negative for itching and rash.       Objective:   Physical Exam   Constitutional: She appears well-developed and well-nourished. No distress.   Neurological: She is alert and oriented to person, place, and time. She is not disoriented.   Psychiatric: She has a normal mood and affect.   Skin:   Areas Examined (abnormalities noted in diagram):   Head / Face Inspection Performed  Neck Inspection Performed  RUE Inspected  LUE Inspection Performed                 Diagram Legend     Erythematous scaling macule/papule c/w actinic keratosis       Vascular papule c/w angioma      Pigmented verrucoid papule/plaque c/w seborrheic keratosis      Yellow umbilicated papule c/w sebaceous hyperplasia      Irregularly shaped tan macule c/w lentigo     1-2 mm smooth white papules consistent with Milia      Movable subcutaneous cyst with punctum c/w epidermal inclusion cyst      Subcutaneous movable cyst c/w pilar cyst       Firm pink to brown papule c/w dermatofibroma      Pedunculated fleshy papule(s) c/w skin tag(s)      Evenly pigmented macule c/w junctional nevus     Mildly variegated pigmented, slightly irregular-bordered macule c/w mildly atypical nevus      Flesh colored to evenly pigmented papule c/w intradermal nevus       Pink pearly papule/plaque c/w basal cell carcinoma      Erythematous hyperkeratotic cursted plaque c/w SCC      Surgical scar with no sign of skin cancer recurrence      Open and closed comedones      Inflammatory papules and pustules      Verrucoid papule consistent consistent with wart     Erythematous eczematous patches and plaques     Dystrophic onycholytic nail with subungual debris c/w onychomycosis     Umbilicated papule    Erythematous-base heme-crusted tan verrucoid plaque consistent with inflamed seborrheic keratosis     Erythematous Silvery Scaling Plaque c/w Psoriasis     See annotation      Assessment / Plan:        Acne keloidalis nuchae  -     triamcinolone acetonide injection 10 mg  Intralesional Kenalog 10mg/cc (1 cc total) injected into 7 lesions on the scalp today after obtaining verbal consent including risk of surrounding hypopigmentation. Patient tolerated procedure well.    Units: 1  NDC for Kenalog 10mg/cc:  2078-2150-21      Postinflammatory pigmentary changes  Left inframammary  S/p cystic nodule, resolved.     History of hidradenitis suppurativa  Recent inflamed cystic nodule resolved spontaneously   Recommend wash with hibiclens from neck down 2-3x/week               Follow up in about 6 months (around 12/4/2024).

## 2024-07-05 ENCOUNTER — PATIENT MESSAGE (OUTPATIENT)
Dept: OBSTETRICS AND GYNECOLOGY | Facility: CLINIC | Age: 29
End: 2024-07-05
Payer: COMMERCIAL

## 2024-07-22 RX ORDER — LAMOTRIGINE 100 MG/1
TABLET ORAL
Qty: 210 TABLET | Refills: 1 | Status: SHIPPED | OUTPATIENT
Start: 2024-07-22

## 2024-09-16 RX ORDER — LAMOTRIGINE 100 MG/1
TABLET ORAL
Qty: 210 TABLET | Refills: 1 | Status: SHIPPED | OUTPATIENT
Start: 2024-09-16

## 2024-09-30 ENCOUNTER — OFFICE VISIT (OUTPATIENT)
Dept: FAMILY MEDICINE | Facility: CLINIC | Age: 29
End: 2024-09-30
Payer: COMMERCIAL

## 2024-09-30 ENCOUNTER — LAB VISIT (OUTPATIENT)
Dept: LAB | Facility: HOSPITAL | Age: 29
End: 2024-09-30
Attending: STUDENT IN AN ORGANIZED HEALTH CARE EDUCATION/TRAINING PROGRAM
Payer: COMMERCIAL

## 2024-09-30 VITALS
OXYGEN SATURATION: 99 % | HEART RATE: 92 BPM | DIASTOLIC BLOOD PRESSURE: 80 MMHG | HEIGHT: 61 IN | SYSTOLIC BLOOD PRESSURE: 116 MMHG | BODY MASS INDEX: 52.44 KG/M2 | WEIGHT: 277.75 LBS

## 2024-09-30 DIAGNOSIS — R79.89 LOW VITAMIN D LEVEL: ICD-10-CM

## 2024-09-30 DIAGNOSIS — Z76.89 ENCOUNTER TO ESTABLISH CARE WITH NEW DOCTOR: Primary | ICD-10-CM

## 2024-09-30 DIAGNOSIS — D72.828 OTHER ELEVATED WHITE BLOOD CELL (WBC) COUNT: ICD-10-CM

## 2024-09-30 DIAGNOSIS — Z00.00 WELLNESS EXAMINATION: ICD-10-CM

## 2024-09-30 DIAGNOSIS — F40.243 FEAR OF FLYING: ICD-10-CM

## 2024-09-30 DIAGNOSIS — E66.01 OBESITY, MORBID, BMI 50 OR HIGHER: ICD-10-CM

## 2024-09-30 DIAGNOSIS — G40.909 NONINTRACTABLE EPILEPSY WITHOUT STATUS EPILEPTICUS, UNSPECIFIED EPILEPSY TYPE: ICD-10-CM

## 2024-09-30 DIAGNOSIS — R42 DIZZINESS: ICD-10-CM

## 2024-09-30 DIAGNOSIS — Z23 IMMUNIZATION DUE: ICD-10-CM

## 2024-09-30 LAB
ALBUMIN SERPL BCP-MCNC: 4.2 G/DL (ref 3.5–5.2)
ALP SERPL-CCNC: 51 U/L (ref 55–135)
ALT SERPL W/O P-5'-P-CCNC: 27 U/L (ref 10–44)
ANION GAP SERPL CALC-SCNC: 9 MMOL/L (ref 8–16)
AST SERPL-CCNC: 22 U/L (ref 10–40)
BILIRUB SERPL-MCNC: 0.4 MG/DL (ref 0.1–1)
BUN SERPL-MCNC: 11 MG/DL (ref 6–20)
CALCIUM SERPL-MCNC: 9.3 MG/DL (ref 8.7–10.5)
CHLORIDE SERPL-SCNC: 107 MMOL/L (ref 95–110)
CHOLEST SERPL-MCNC: 229 MG/DL (ref 120–199)
CHOLEST/HDLC SERPL: 5.2 {RATIO} (ref 2–5)
CO2 SERPL-SCNC: 23 MMOL/L (ref 23–29)
CREAT SERPL-MCNC: 0.9 MG/DL (ref 0.5–1.4)
ERYTHROCYTE [DISTWIDTH] IN BLOOD BY AUTOMATED COUNT: 13.2 % (ref 11.5–14.5)
EST. GFR  (NO RACE VARIABLE): >60 ML/MIN/1.73 M^2
ESTIMATED AVG GLUCOSE: 85 MG/DL (ref 68–131)
GLUCOSE SERPL-MCNC: 81 MG/DL (ref 70–110)
HBA1C MFR BLD: 4.6 % (ref 4–5.6)
HCT VFR BLD AUTO: 41.1 % (ref 37–48.5)
HDLC SERPL-MCNC: 44 MG/DL (ref 40–75)
HDLC SERPL: 19.2 % (ref 20–50)
HGB BLD-MCNC: 13.3 G/DL (ref 12–16)
LDLC SERPL CALC-MCNC: 158.2 MG/DL (ref 63–159)
MCH RBC QN AUTO: 30.9 PG (ref 27–31)
MCHC RBC AUTO-ENTMCNC: 32.4 G/DL (ref 32–36)
MCV RBC AUTO: 95 FL (ref 82–98)
NONHDLC SERPL-MCNC: 185 MG/DL
PLATELET # BLD AUTO: 273 K/UL (ref 150–450)
PMV BLD AUTO: 11 FL (ref 9.2–12.9)
POTASSIUM SERPL-SCNC: 3.9 MMOL/L (ref 3.5–5.1)
PROT SERPL-MCNC: 7.2 G/DL (ref 6–8.4)
RBC # BLD AUTO: 4.31 M/UL (ref 4–5.4)
SODIUM SERPL-SCNC: 139 MMOL/L (ref 136–145)
TRIGL SERPL-MCNC: 134 MG/DL (ref 30–150)
WBC # BLD AUTO: 7.63 K/UL (ref 3.9–12.7)

## 2024-09-30 PROCEDURE — 80053 COMPREHEN METABOLIC PANEL: CPT | Performed by: STUDENT IN AN ORGANIZED HEALTH CARE EDUCATION/TRAINING PROGRAM

## 2024-09-30 PROCEDURE — 83036 HEMOGLOBIN GLYCOSYLATED A1C: CPT | Performed by: STUDENT IN AN ORGANIZED HEALTH CARE EDUCATION/TRAINING PROGRAM

## 2024-09-30 PROCEDURE — 82306 VITAMIN D 25 HYDROXY: CPT | Performed by: STUDENT IN AN ORGANIZED HEALTH CARE EDUCATION/TRAINING PROGRAM

## 2024-09-30 PROCEDURE — 3074F SYST BP LT 130 MM HG: CPT | Mod: CPTII,S$GLB,, | Performed by: STUDENT IN AN ORGANIZED HEALTH CARE EDUCATION/TRAINING PROGRAM

## 2024-09-30 PROCEDURE — 85027 COMPLETE CBC AUTOMATED: CPT | Performed by: STUDENT IN AN ORGANIZED HEALTH CARE EDUCATION/TRAINING PROGRAM

## 2024-09-30 PROCEDURE — 3044F HG A1C LEVEL LT 7.0%: CPT | Mod: CPTII,S$GLB,, | Performed by: STUDENT IN AN ORGANIZED HEALTH CARE EDUCATION/TRAINING PROGRAM

## 2024-09-30 PROCEDURE — 1160F RVW MEDS BY RX/DR IN RCRD: CPT | Mod: CPTII,S$GLB,, | Performed by: STUDENT IN AN ORGANIZED HEALTH CARE EDUCATION/TRAINING PROGRAM

## 2024-09-30 PROCEDURE — 80061 LIPID PANEL: CPT | Performed by: STUDENT IN AN ORGANIZED HEALTH CARE EDUCATION/TRAINING PROGRAM

## 2024-09-30 PROCEDURE — 99395 PREV VISIT EST AGE 18-39: CPT | Mod: S$GLB,,, | Performed by: STUDENT IN AN ORGANIZED HEALTH CARE EDUCATION/TRAINING PROGRAM

## 2024-09-30 PROCEDURE — 99999 PR PBB SHADOW E&M-EST. PATIENT-LVL IV: CPT | Mod: PBBFAC,,, | Performed by: STUDENT IN AN ORGANIZED HEALTH CARE EDUCATION/TRAINING PROGRAM

## 2024-09-30 PROCEDURE — 1159F MED LIST DOCD IN RCRD: CPT | Mod: CPTII,S$GLB,, | Performed by: STUDENT IN AN ORGANIZED HEALTH CARE EDUCATION/TRAINING PROGRAM

## 2024-09-30 PROCEDURE — 3008F BODY MASS INDEX DOCD: CPT | Mod: CPTII,S$GLB,, | Performed by: STUDENT IN AN ORGANIZED HEALTH CARE EDUCATION/TRAINING PROGRAM

## 2024-09-30 PROCEDURE — 3079F DIAST BP 80-89 MM HG: CPT | Mod: CPTII,S$GLB,, | Performed by: STUDENT IN AN ORGANIZED HEALTH CARE EDUCATION/TRAINING PROGRAM

## 2024-09-30 PROCEDURE — 36415 COLL VENOUS BLD VENIPUNCTURE: CPT | Mod: PO | Performed by: STUDENT IN AN ORGANIZED HEALTH CARE EDUCATION/TRAINING PROGRAM

## 2024-09-30 RX ORDER — ALPRAZOLAM 0.25 MG/1
0.25 TABLET ORAL 2 TIMES DAILY PRN
Qty: 14 TABLET | Refills: 0 | Status: SHIPPED | OUTPATIENT
Start: 2024-09-30

## 2024-10-01 LAB — 25(OH)D3+25(OH)D2 SERPL-MCNC: 11 NG/ML (ref 30–96)

## 2024-10-01 NOTE — PROGRESS NOTES
Patient ID: Terra Hamilton is a 28 y.o. female.    Chief Complaint: Establish Care    History of Present Illness    CHIEF COMPLAINT:  Terra presents today to establish care with complaints of dizziness, earwax buildup, and medication for flying anxiety. She is new to me and was last seen in this clinic on 07/31/2021 by Dr. Baltazar.    DIZZINESS AND EAR ISSUES:  She reports a brief episode of dizziness while lying in bed, describing a sensation of the room spinning. The episode has not recurred. She denies associated symptoms such as fainting. She also complains of earwax buildup, primarily in the left ear, which she believes is affecting her hearing. She reports difficulty hearing in social settings, often needing to ask people to repeat themselves. She denies any current ear pain or sensation of fluid in the ears. On exam, there is minimal ear wax and ear drums are normal.    FLYING ANXIETY:  She expresses significant anxiety related to flying. During her last flight in 2022, her heart rate was elevated to 150 bpm, which only decreased to 115 bpm after taking Xanax. Dramamine was ineffective in managing symptoms during air travel. She reports previous use of Xanax for flying anxiety, which was helpful on the first occasion but less effective on the second.    NEUROLOGICAL HISTORY:  She has a history of epilepsy, diagnosed at age 13. She currently takes lamotrigine 3.5 tablets twice daily and follows with neurology, last seen 1/3/24 with Dr. Salinas.    DERMATOLOGICAL ISSUES:  She has chronic urticaria, which began with a severe outbreak of hives on Christie Day last year, requiring multiple hospital visits and steroid treatments. She was last seen by dermatology, Dr. Caldwell, on 6/4/24. The condition was determined to be idiopathic after extensive testing. She has been prescribed an EpiPen for emergencies. She also has folliculitis on the back of her head, for which she receives triamcinolone  injections.    ORTHOPEDIC HISTORY:  She has a history of bilateral foot surgeries, performed by Dr. Obrien prior to 2017. She has not seen a podiatrist since 2017 but reports her feet are currently doing well.    MEDICATIONS:  Current medications include albuterol as needed, betamethasone lotion, Cleocin, EpiPen, famotidine as needed, hydroxyzine as needed, lamotrigine 3.5 tablets twice daily, a multivitamin, and Zofran as needed. She reports not using albuterol recently. Famotidine and hydroxyzine are used as needed for hives. She denies taking doxycycline currently but has used it in the past for skin issues.    ALLERGIES:  She reports allergies to Benadryl, quinolones, tramadol, Wellbutrin, and chocolate. She notes that these allergies can affect her seizure medication.    FAMILY HISTORY:  Her mother has hypertension, heart disease (diagnosed in late 30s or early 40s with valve issues requiring open-heart surgery), arthritis, and gallbladder issues. Her father has hypertension and diabetes. Her sister has asthma, endometriosis, anemia, and anxiety. On the maternal side, her aunt has IBS, and her uncle has ulcerative colitis. Her maternal grandmother has hypertension, while her maternal grandfather had heart disease and is . On the paternal side, her grandmother () had breast cancer and myasthenia gravis. Her paternal grandfather has myasthenia gravis and diabetes. She also reports a paternal great-aunt who passed away from ovarian cancer.    SURGICAL HISTORY:  She has had a tonsillectomy, ear tube insertion, wisdom teeth removal, and bilateral foot surgeries.    SOCIAL HISTORY:  She is self-employed, working for [employer]. She lives with her parents. She reports occasional alcohol use for special occasions. She is sexually active and uses protection. She denies smoking and drug use.    EXERCISE:  She attends the gym daily, participating in exercise classes and using the [employer] machine. She  "achieves at least 150 minutes of moderate aerobic activity per week. During elliptical use, her heart rate typically ranges between 140-150 BPM.    OTHER MEDICAL ISSUES:  She reports very low vitamin D levels.    ROS: as above        Physical Exam    Vitals: Elevated BMI.  Vitals:    09/30/24 0757   BP: 116/80   Patient Position: Sitting   Pulse: 92   SpO2: 99%   Weight: 126 kg (277 lb 12.5 oz)   Height: 5' 1" (1.549 m)   Body mass index is 52.49 kg/m².  General: No acute distress. Well-developed. Well-nourished.  Eyes: PERRL. Sclerae anicteric.  HENT: Nares patent. Moist oral mucosa without lesion.  Ears: Bilateral TMs clear. Bilateral EACs clear. Scar tissue where ear tubes were previously.  Cardiovascular: Regular rate. Regular rhythm. No murmurs. No rubs. No gallops. Normal S1, S2.  Respiratory: Normal respiratory effort. Clear to auscultation bilaterally. No rales. No rhonchi. No wheezing.  Abdomen: Soft. Non-tender. No mass. Normoactive bowel sounds.  Musculoskeletal: No obvious deformity.  Extremities: No lower extremity edema.  Neurological: Nonfocal. No slurred speech. Normal gait.  Psychiatric: Calm cooperative.  Skin: Warm. Dry. No rash.        Assessment & Plan    Assessed dizziness while lying down; likely related to brief vestibular disturbance, possibly due to ear fluid or crystal movement  Evaluated ears; minimal wax found, eardrums appear normal with visible scar tissue from previous ear tubes  Considered use of low-dose anxiolytic for flying anxiety  Reviewed recent history of chronic urticaria, noted as idiopathic after extensive workup  Assessed vitamin D levels; considering high-dose supplementation based on previous low results  Evaluated cardiovascular health; noted elevated heart rate    Encounter to establish care with new doctor    Wellness examination    Obesity, morbid, BMI 50 or higher  - Ambulatory referral/consult to Bariatric Surgery; Future  - CBC Without Differential; Future  - " Comprehensive Metabolic Panel; Future  - Lipid Panel; Future  - HEMOGLOBIN A1C; Future  - Mediterranean diet and exercise recommended.    Epilepsy  - Stable. Continue to follow with Dr. Salinas.    Low vitamin D level  - Check vitamin D hydroxy after reviewing levels last May.  - If under 30 consider high dose once weekly 50,000 IU for three months and recheck.    Other elevated white blood cell (WBC) count  - CBC Without Differential; Future    Fear of flying  - ALPRAZolam (XANAX) 0.25 MG tablet; Take 1 tablet (0.25 mg total) by mouth 2 (two) times daily as needed for Anxiety.  Dispense: 14 tablet; Refill: 0  - Educated the patient on potential side effects of anxiolytic medication, including risks of mixing with alcohol or pain medication.    Immunization due  - Consider flu vaccine and COVID-19 vaccine at your pharmacy.    Dizziness  - Currently resolved. A trigger may have been turning head while lying in bed but not sure. Self monitor for recurrence of symptoms.    LABS:  - Ordered CBC, CMP, lipid panel, and hemoglobin A1c.    FOLLOW UP:  - Follow up after lab results are available; will update patient through portal.  - Follow up for next well-check as scheduled.         Follow up in about 1 year (around 9/30/2025) for Annual Wellness or sooner if needed.    This note was generated with the assistance of ambient listening technology. Verbal consent was obtained by the patient and accompanying visitor(s) for the recording of patient appointment to facilitate this note. I attest to having reviewed and edited the generated note for accuracy, though some syntax or spelling errors may persist. Please contact the author of this note for any clarification.   Admission

## 2024-10-06 ENCOUNTER — PATIENT MESSAGE (OUTPATIENT)
Dept: NEUROLOGY | Facility: CLINIC | Age: 29
End: 2024-10-06
Payer: COMMERCIAL

## 2024-10-06 DIAGNOSIS — R79.89 LOW VITAMIN D LEVEL: Primary | ICD-10-CM

## 2024-10-06 RX ORDER — ERGOCALCIFEROL 1.25 MG/1
50000 CAPSULE ORAL
Qty: 12 CAPSULE | Refills: 0 | Status: SHIPPED | OUTPATIENT
Start: 2024-10-06

## 2024-10-06 NOTE — LETTER
[facility]  [address]  [place] [address]  Phone: [phone]  Fax: [phone] October 7, 2024    Terra Hamilton  [address] [place] [address]      To Whom It May Concern:    Terra Hamilton is unable to participate in jury duty due to epilepsy and risk of seizure.    If you have any questions or concerns, please feel free to call my office.    Sincerely,        Mireille Salinas MD

## 2024-10-07 ENCOUNTER — TELEPHONE (OUTPATIENT)
Dept: NEUROLOGY | Facility: CLINIC | Age: 29
End: 2024-10-07
Payer: COMMERCIAL

## 2024-11-11 RX ORDER — LAMOTRIGINE 100 MG/1
350 TABLET ORAL 2 TIMES DAILY
Qty: 210 TABLET | Refills: 2 | Status: SHIPPED | OUTPATIENT
Start: 2024-11-11

## 2024-11-14 ENCOUNTER — PATIENT MESSAGE (OUTPATIENT)
Dept: OBSTETRICS AND GYNECOLOGY | Facility: CLINIC | Age: 29
End: 2024-11-14
Payer: COMMERCIAL

## 2024-12-10 ENCOUNTER — OFFICE VISIT (OUTPATIENT)
Facility: CLINIC | Age: 29
End: 2024-12-10
Payer: COMMERCIAL

## 2024-12-10 DIAGNOSIS — L73.0 ACNE KELOIDALIS NUCHAE: Primary | ICD-10-CM

## 2024-12-10 DIAGNOSIS — L73.2 HIDRADENITIS SUPPURATIVA: ICD-10-CM

## 2024-12-10 PROCEDURE — 1160F RVW MEDS BY RX/DR IN RCRD: CPT | Mod: CPTII,S$GLB,, | Performed by: DERMATOLOGY

## 2024-12-10 PROCEDURE — 87070 CULTURE OTHR SPECIMN AEROBIC: CPT | Performed by: DERMATOLOGY

## 2024-12-10 PROCEDURE — 11900 INJECT SKIN LESIONS </W 7: CPT | Mod: S$GLB,,, | Performed by: DERMATOLOGY

## 2024-12-10 PROCEDURE — 99999 PR PBB SHADOW E&M-EST. PATIENT-LVL II: CPT | Mod: PBBFAC,,, | Performed by: DERMATOLOGY

## 2024-12-10 PROCEDURE — 1159F MED LIST DOCD IN RCRD: CPT | Mod: CPTII,S$GLB,, | Performed by: DERMATOLOGY

## 2024-12-10 PROCEDURE — 99213 OFFICE O/P EST LOW 20 MIN: CPT | Mod: 25,S$GLB,, | Performed by: DERMATOLOGY

## 2024-12-10 PROCEDURE — 3044F HG A1C LEVEL LT 7.0%: CPT | Mod: CPTII,S$GLB,, | Performed by: DERMATOLOGY

## 2024-12-10 NOTE — PROGRESS NOTES
Subjective:      Patient ID:  Terra Hamilton is a 29 y.o. female who presents for No chief complaint on file.    HPI  Patient present for scalp follow up.   Pt states scalp issues are improving. Some bigger bumps.  Also with HS, rx doxy after last visit when no improvement with topicals and ILK 10 to left breast cystic nodule. Patient would like the left breast to be rechecked because it had popped and pus came out.  Washing chest with bp wash  Finds skin is better in winter     Review of Systems   Constitutional:  Negative for fever and chills.   Skin:  Negative for itching and rash.       Objective:   Physical Exam   Constitutional: She appears well-developed and well-nourished. No distress.   Neurological: She is alert and oriented to person, place, and time. She is not disoriented.   Psychiatric: She has a normal mood and affect.   Skin:   Areas Examined (abnormalities noted in diagram):   Head / Face Inspection Performed  Neck Inspection Performed  RUE Inspected  LUE Inspection Performed                 Diagram Legend     Erythematous scaling macule/papule c/w actinic keratosis       Vascular papule c/w angioma      Pigmented verrucoid papule/plaque c/w seborrheic keratosis      Yellow umbilicated papule c/w sebaceous hyperplasia      Irregularly shaped tan macule c/w lentigo     1-2 mm smooth white papules consistent with Milia      Movable subcutaneous cyst with punctum c/w epidermal inclusion cyst      Subcutaneous movable cyst c/w pilar cyst      Firm pink to brown papule c/w dermatofibroma      Pedunculated fleshy papule(s) c/w skin tag(s)      Evenly pigmented macule c/w junctional nevus     Mildly variegated pigmented, slightly irregular-bordered macule c/w mildly atypical nevus      Flesh colored to evenly pigmented papule c/w intradermal nevus       Pink pearly papule/plaque c/w basal cell carcinoma      Erythematous hyperkeratotic cursted plaque c/w SCC      Surgical scar with no sign of  skin cancer recurrence      Open and closed comedones      Inflammatory papules and pustules      Verrucoid papule consistent consistent with wart     Erythematous eczematous patches and plaques     Dystrophic onycholytic nail with subungual debris c/w onychomycosis     Umbilicated papule    Erythematous-base heme-crusted tan verrucoid plaque consistent with inflamed seborrheic keratosis     Erythematous Silvery Scaling Plaque c/w Psoriasis     See annotation      Assessment / Plan:        Acne keloidalis nuchae  Intralesional Kenalog 10mg/cc (1 cc total) injected into 7 lesions on the scalp today after obtaining verbal consent including risk of surrounding hypopigmentation. Patient tolerated procedure well.    Units: 1  NDC for Kenalog 10mg/cc:  4567-2131-13      Hidradenitis suppurativa vs other  Culture today  Lesions under breasts spontaneously ruptured recently. Declines ILK   -     Aerobic culture  Consider humira in future   Discussed  benefits and risks of Humira including but not limited to injection site reactions, increased risk of infection, decreased tumor surveillance, optic neuritis. Patient informed to discontinue Humira and notify our office if an infection develops.             No follow-ups on file.

## 2024-12-13 LAB — BACTERIA SPEC AEROBE CULT: NORMAL

## 2024-12-21 ENCOUNTER — PATIENT MESSAGE (OUTPATIENT)
Facility: CLINIC | Age: 29
End: 2024-12-21
Payer: COMMERCIAL

## 2024-12-24 ENCOUNTER — TELEPHONE (OUTPATIENT)
Dept: FAMILY MEDICINE | Facility: CLINIC | Age: 29
End: 2024-12-24
Payer: COMMERCIAL

## 2024-12-24 NOTE — TELEPHONE ENCOUNTER
----- Message from Crystal sent at 12/24/2024  7:49 AM CST -----  Contact: PT  Type:  Same Day Appointment Request    Caller is requesting a same day appointment.  Caller declined first available appointment listed below.      Name of Caller:  PT  When is the first available appointment?  12/30/24  Symptoms:  Symptom: Cough  Outcome: Schedule an appointment to be seen within 24 hours.  Reason: Caller denied all higher acuity questions    The caller accepted this outcome.  Best Call Back Number:  [phone]  Additional Information:

## 2024-12-24 NOTE — TELEPHONE ENCOUNTER
Offered Pt first avail and Pt agreed  Advised Pt if she feels like she needs to be seen earlier we have UC on [address]   Pt VU

## 2024-12-30 ENCOUNTER — OFFICE VISIT (OUTPATIENT)
Dept: FAMILY MEDICINE | Facility: CLINIC | Age: 29
End: 2024-12-30
Payer: COMMERCIAL

## 2024-12-30 VITALS
TEMPERATURE: 98 F | WEIGHT: 268.75 LBS | HEART RATE: 72 BPM | DIASTOLIC BLOOD PRESSURE: 78 MMHG | BODY MASS INDEX: 50.78 KG/M2 | SYSTOLIC BLOOD PRESSURE: 118 MMHG | OXYGEN SATURATION: 98 %

## 2024-12-30 DIAGNOSIS — R79.89 LOW VITAMIN D LEVEL: ICD-10-CM

## 2024-12-30 DIAGNOSIS — F40.243 FEAR OF FLYING: ICD-10-CM

## 2024-12-30 DIAGNOSIS — G40.909 NONINTRACTABLE EPILEPSY WITHOUT STATUS EPILEPTICUS, UNSPECIFIED EPILEPSY TYPE: ICD-10-CM

## 2024-12-30 DIAGNOSIS — Z00.00 WELLNESS EXAMINATION: ICD-10-CM

## 2024-12-30 DIAGNOSIS — J18.9 PNEUMONIA DUE TO INFECTIOUS ORGANISM, UNSPECIFIED LATERALITY, UNSPECIFIED PART OF LUNG: Primary | ICD-10-CM

## 2024-12-30 DIAGNOSIS — E66.01 OBESITY, MORBID, BMI 50 OR HIGHER: ICD-10-CM

## 2024-12-30 PROCEDURE — 3044F HG A1C LEVEL LT 7.0%: CPT | Mod: CPTII,S$GLB,, | Performed by: STUDENT IN AN ORGANIZED HEALTH CARE EDUCATION/TRAINING PROGRAM

## 2024-12-30 PROCEDURE — 99214 OFFICE O/P EST MOD 30 MIN: CPT | Mod: S$GLB,,, | Performed by: STUDENT IN AN ORGANIZED HEALTH CARE EDUCATION/TRAINING PROGRAM

## 2024-12-30 PROCEDURE — 3078F DIAST BP <80 MM HG: CPT | Mod: CPTII,S$GLB,, | Performed by: STUDENT IN AN ORGANIZED HEALTH CARE EDUCATION/TRAINING PROGRAM

## 2024-12-30 PROCEDURE — 99999 PR PBB SHADOW E&M-EST. PATIENT-LVL V: CPT | Mod: PBBFAC,,, | Performed by: STUDENT IN AN ORGANIZED HEALTH CARE EDUCATION/TRAINING PROGRAM

## 2024-12-30 PROCEDURE — 3008F BODY MASS INDEX DOCD: CPT | Mod: CPTII,S$GLB,, | Performed by: STUDENT IN AN ORGANIZED HEALTH CARE EDUCATION/TRAINING PROGRAM

## 2024-12-30 PROCEDURE — 1159F MED LIST DOCD IN RCRD: CPT | Mod: CPTII,S$GLB,, | Performed by: STUDENT IN AN ORGANIZED HEALTH CARE EDUCATION/TRAINING PROGRAM

## 2024-12-30 PROCEDURE — 3074F SYST BP LT 130 MM HG: CPT | Mod: CPTII,S$GLB,, | Performed by: STUDENT IN AN ORGANIZED HEALTH CARE EDUCATION/TRAINING PROGRAM

## 2024-12-30 PROCEDURE — 1160F RVW MEDS BY RX/DR IN RCRD: CPT | Mod: CPTII,S$GLB,, | Performed by: STUDENT IN AN ORGANIZED HEALTH CARE EDUCATION/TRAINING PROGRAM

## 2024-12-30 RX ORDER — DOXYCYCLINE 100 MG/1
100 CAPSULE ORAL 2 TIMES DAILY
COMMUNITY
Start: 2024-12-24

## 2024-12-30 RX ORDER — ALBUTEROL SULFATE 0.63 MG/3ML
SOLUTION RESPIRATORY (INHALATION)
COMMUNITY
Start: 2024-12-24

## 2024-12-30 RX ORDER — AZELASTINE 1 MG/ML
2 SPRAY, METERED NASAL 2 TIMES DAILY
COMMUNITY
Start: 2024-11-16

## 2024-12-30 NOTE — PROGRESS NOTES
Subjective:       Patient ID: Terra Hamilton is a 29 y.o. female.    Chief Complaint: Cough (Since Nov 10th - Dx Pneumonia 12/24/24) and Follow-up    HPI    29 year old female known to me last here to establish care 9/30/24, for regularly scheduled follow up however she has recently been diagnosed with pneumonia. She had the cough for over a month. [facility] on [address] where they tested for flu and it was negative. They wanted her to see an allergist. She asked for xray and they called back and said pneumonia. Doxycycline course ends on Friday. She is using the nebulizer now twice daily, down from 3-4 times a day. She still coughs up green mucus.    She agrees to call to schedule for bariatric appointment - it looks like they tried to contact her and missed her. She tried the xanax before flying and it helped on the departure trip when she sat in the aisle seat but on the way back sitting by the window she was still anxious. She will aim for aisle from now on. She recently saw Dr. Caldwell for cyst of the chest and culture was negative. She had another one pop up last week but it has gotten better with the doxycycline. She had recommendations to wash her clothes on high heat in fragrance free detergent and add 1 c of vinegar to the rinse cycle for disinfection. She still follows with Dr. Salinas for epilepsy.    We will plan for her to get her vitamin D level checked after she finishes her course of high dose weekly vitamin D. If back to normal level she will need to start daily multivitamin. Otherwise, plan for wellness check up in September with fasting labs beforehand - or sooner follow up if needed (eg. If symptoms still severe after finishing doxycycline or if new fever will need to return for recheck of this acute illness).    Past Medical History:   Diagnosis Date    Focal seizures     Folliculitis     History of polycystic ovarian disease     Seizures     2013    Speech  delay        Past Surgical History:   Procedure Laterality Date    BUNIONECTOMY Right 6/1/2018    Procedure: BUNIONECTOMY;  Surgeon: Lm Obrien DPM;  Location: [facility];  Service: Podiatry;  Laterality: Right;    CORRECTION OF HAMMER TOE Right 6/1/2018    Procedure: CORRECTION, HAMMER TOE;  Surgeon: Lm Obrien DPM;  Location: [facility] OR;  Service: Podiatry;  Laterality: Right;    FOOT ARTHROPLASTY Right 6/1/2018    Procedure: orif subtalar joint with Hyprocure implant ;  Surgeon: Lm Obrien DPM;  Location: [facility];  Service: Podiatry;  Laterality: Right;    FOOT SURGERY      TONSILLECTOMY, ADENOIDECTOMY, BILATERAL MYRINGOTOMY AND TUBES      WISDOM TOOTH EXTRACTION         Review of patient's allergies indicates:   Allergen Reactions    Benadryl [diphenhydramine hcl]      Lowers seizure threshold    Quinolones      Lowers seizure threshold    Tramadol      Lowers seizure threshold    Wellbutrin [bupropion hcl]      Lowers seizure threshold    Chocolate Rash       Social History     Socioeconomic History    Marital status: Single   Occupational History    Occupation: Self employed     Comment:    Tobacco Use    Smoking status: Never    Smokeless tobacco: Never   Substance and Sexual Activity    Alcohol use: Yes     Comment: only on special occasions    Drug use: No    Sexual activity: Yes     Partners: Male     Birth control/protection: Condom   Social History Narrative    Lives with Mom, Dad. 2 dogs.     Social Drivers of Health     Financial Resource Strain: Low Risk  (12/26/2023)    Overall Financial Resource Strain (CARDIA)     Difficulty of Paying Living Expenses: Not hard at all   Food Insecurity: No Food Insecurity (12/26/2023)    Hunger Vital Sign     Worried About Running Out of Food in the Last Year: Never true     Ran Out of Food in the Last Year: Never true   Transportation Needs: No Transportation Needs (12/26/2023)    PRAPARE - Transportation     Lack of Transportation  (Medical): No     Lack of Transportation (Non-Medical): No   Physical Activity: Inactive (12/26/2023)    Exercise Vital Sign     Days of Exercise per Week: 0 days     Minutes of Exercise per Session: 0 min   Stress: No Stress Concern Present (12/26/2023)    [employer] - Occupational Stress Questionnaire     Feeling of Stress : Not at all   Housing Stability: Low Risk  (12/26/2023)    Housing Stability Vital Sign     Unable to Pay for Housing in the Last Year: No     Number of Places Lived in the Last Year: 1     Unstable Housing in the Last Year: No       Current Outpatient Medications on File Prior to Visit   Medication Sig Dispense Refill    albuterol (ACCUNEB) 0.63 mg/3 mL Nebu USE 1 VIAL IN NEBULIZER EVERY 4 TO 6 HOURS AS NEEDED FOR SHORTNESS OF BREATH FOR WHEEZING      albuterol (VENTOLIN HFA) 90 mcg/actuation inhaler Inhale 2 puffs into the lungs every 6 (six) hours as needed for Wheezing. Rescue 18 g 0    ALPRAZolam (XANAX) 0.25 MG tablet Take 1 tablet (0.25 mg total) by mouth 2 (two) times daily as needed for Anxiety. 14 tablet 0    azelastine (ASTELIN) 137 mcg (0.1 %) nasal spray 2 sprays 2 (two) times daily.      betamethasone valerate 0.1% (VALISONE) 0.1 % Lotn Aaa qday 60 mL 4    doxycycline (VIBRAMYCIN) 100 MG Cap Take 100 mg by mouth 2 (two) times daily.      EPINEPHrine (EPIPEN) 0.3 mg/0.3 mL AtIn Inject 0.6 mLs (0.6 mg total) into the muscle as needed. 2 each 0    ergocalciferol (ERGOCALCIFEROL) 50,000 unit Cap Take 1 capsule (50,000 Units total) by mouth every 7 days. 12 capsule 0    famotidine (PEPCID) 20 MG tablet Take 1 tablet (20 mg total) by mouth 2 (two) times daily. (Patient taking differently: Take 20 mg by mouth nightly as needed.) 20 tablet 0    hydrOXYzine HCL (ATARAX) 25 MG tablet Take 1 tablet (25 mg total) by mouth 3 (three) times daily as needed for Itching. 21 tablet 0    lamoTRIgine (LAMICTAL) 100 MG tablet Take 3.5 tablets (350 mg total) by mouth 2  (two) times daily. 210 tablet 2    multivit-min/ferrous fumarate (MULTI VITAMIN ORAL) Take 1 Dose by mouth once daily.      ondansetron (ZOFRAN-ODT) 4 MG TbDL Take 1 tablet (4 mg total) by mouth 2 (two) times daily as needed (nausea). 20 tablet 0    clindamycin (CLEOCIN T) 1 % external solution Aaa qd posterior scalp (Patient not taking: Reported on 12/30/2024) 60 mL 6     Current Facility-Administered Medications on File Prior to Visit   Medication Dose Route Frequency Provider Last Rate Last Admin    triamcinolone acetonide injection 10 mg  10 mg Intradermal 1 time in Clinic/HOD            Family History   Problem Relation Name Age of Onset    Hypertension Mother      Heart disease Mother  35        valve repair    Arthritis Mother      Cholelithiasis Mother      Hypertension Father      Diabetes Mellitus Father      Asthma Sister      Endometriosis Sister      Anemia Sister      Anxiety disorder Sister      Irritable bowel syndrome Maternal Aunt      Ulcerative colitis Maternal Uncle      Hypertension Maternal Grandmother      Heart disease Maternal Grandfather      Breast cancer Paternal Grandmother      Myasthenia gravis Paternal Grandmother      Bone cancer Paternal Grandmother      Myasthenia gravis Paternal Grandfather      Diabetes Paternal Grandfather      Ovarian cancer Other paternal gr aunt        Review of Systems      Objective:      /78   Pulse 72   Temp 97.8 °F (36.6 °C) (Oral)   Wt 121.9 kg (268 lb 11.9 oz)   LMP 12/26/2024 (Exact Date)   SpO2 98%   BMI 50.78 kg/m²   Physical Exam  Constitutional:       General: She is not in acute distress.     Appearance: She is not toxic-appearing.   HENT:      Nose: Congestion present.      Mouth/Throat:      Mouth: Mucous membranes are moist.   Eyes:      Conjunctiva/sclera: Conjunctivae normal.   Cardiovascular:      Rate and Rhythm: Normal rate and regular rhythm.      Heart sounds: Normal heart sounds.   Pulmonary:      Effort: Pulmonary effort  is normal. No respiratory distress.      Breath sounds: Normal breath sounds. No stridor. No wheezing, rhonchi or rales.      Comments: Frequent coughing; relieved with cup of water from Nurse Farzana  Neurological:      Mental Status: She is alert. Mental status is at baseline.   Psychiatric:         Mood and Affect: Mood normal.         Behavior: Behavior normal.         Assessment:       1. Pneumonia due to infectious organism, unspecified laterality, unspecified part of lung    2. Obesity, morbid, BMI 50 or higher    3. Low vitamin D level    4. Fear of flying    5. Nonintractable epilepsy without status epilepticus, unspecified epilepsy type    6. Wellness examination        Plan:         Pneumonia  - Diagnosed at urgent care on Tuesday before Christmas. Still coughing productive of green mucus but needing nebulizer less.  - No reflux or skin sensitivity noted with doxcycline - it did help clear up a cyst on her chest.  - If symptoms still severe at conclusion of antibiotic return for re-evaluation - may need another antibiotic.  - I am not able to review the urgent care records - I can see her In & Out visit from 11/16/24 but nothing from 12/24/24, yet.    Obesity, morbid, BMI 50 or higher  - Patient agrees to call the office number to ask to schedule; she must have missed the call.    Low vitamin D level  - Will anticipate her to have her level drawn after she completes the three month course. Will update her through the portal.    Fear of flying  - Some relief with xanax. Okay to call for refills. Anticipates multiple flights this year - going to be in a wedding, another wedding in [place], etc.    Nonintractable epilepsy without status epilepticus, unspecified epilepsy type  - Continue to follow with Dr. Salinas as scheduled.    Wellness examination  -     CBC Auto Differential; Future; Expected date: 10/01/2025  -     Comprehensive Metabolic Panel; Future; Expected date: 10/01/2025  -     Hemoglobin A1C;  Future; Expected date: 10/01/2025  -     Lipid Panel; Future; Expected date: 10/01/2025  -     TSH; Future; Expected date: 10/01/2025  - Fasting labs ordered to be completed before wellness visit next October.      Counseled on regular exercise, maintenance of a healthy weight, balanced diet rich in fruits/vegetables and lean protein, and avoidance of unhealthy habits like smoking and excessive alcohol intake.    Visit today included increased complexity associated with the care of the episodic problem pneumonia addressed and managing the longitudinal care of the patient due to the serious and/or complex managed problem(s) low vitamin D level.

## 2025-01-21 ENCOUNTER — OFFICE VISIT (OUTPATIENT)
Dept: NEUROLOGY | Facility: CLINIC | Age: 30
End: 2025-01-21
Payer: COMMERCIAL

## 2025-01-21 DIAGNOSIS — E66.813 CLASS 3 SEVERE OBESITY WITH SERIOUS COMORBIDITY AND BODY MASS INDEX (BMI) OF 50.0 TO 59.9 IN ADULT, UNSPECIFIED OBESITY TYPE: ICD-10-CM

## 2025-01-21 DIAGNOSIS — E66.01 CLASS 3 SEVERE OBESITY WITH SERIOUS COMORBIDITY AND BODY MASS INDEX (BMI) OF 50.0 TO 59.9 IN ADULT, UNSPECIFIED OBESITY TYPE: ICD-10-CM

## 2025-01-21 DIAGNOSIS — G40.909 NONINTRACTABLE EPILEPSY WITHOUT STATUS EPILEPTICUS, UNSPECIFIED EPILEPSY TYPE: Primary | ICD-10-CM

## 2025-01-21 RX ORDER — LAMOTRIGINE 100 MG/1
350 TABLET ORAL 2 TIMES DAILY
Qty: 210 TABLET | Refills: 11 | Status: SHIPPED | OUTPATIENT
Start: 2025-01-21

## 2025-01-21 NOTE — PROGRESS NOTES
The patient location is: home in [place]  The chief complaint leading to consultation is: seizure    Visit type: audiovisual    Face to Face time with patient: 20 min  41 minutes of total time spent on the encounter, which includes face to face time and non-face to face time preparing to see the patient (eg, review of tests), Obtaining and/or reviewing separately obtained history, Documenting clinical information in the electronic or other health record, Independently interpreting results (not separately reported) and communicating results to the patient/family/caregiver, or Care coordination (not separately reported).     Each patient to whom he or she provides medical services by telemedicine is:  (1) informed of the relationship between the physician and patient and the respective role of any other health care provider with respect to management of the patient; and (2) notified that he or she may decline to receive medical services by telemedicine and may withdraw from such care at any time.    Notes:      Date: 1/21/2025    Patient ID: Terra Hamilton is a 29 y.o. female.    Chief Complaint: seizure      History of Present Illness:  Ms. Hamilton is a 29 y.o. female who presents for followup of epilepsy.      Interval history: Since our last visit in Jan 2024, she continues on lamictal 350 mg BID. Level in  Aug 2023 was 4.7. No further seizures, last was July 20, 2023 (staring spell) and lamictal level was lower at 2.9 at that time due to being on OCP. She is off OCP now. She is going to talk with OB about IUD.    In fall 2024, she was having tingling in the back of her head. No pain. She noted it was more prominent with certain positions of her neck or head- down and to the left is worse. It would just last a few seconds. It was associated with some dizziness briefly. These are not typical symptoms with her seizures. Her ears were also blocked feeling. This resolved and she hasn't had it in 1-2 months.  "      Seizure history:  She had onset of seizures in 2010/2011. She thinks that 2007 was actually her first seizure though. She recalls being in class and "blanked out". She woke up to seeing everyone move around to go to another class. In 7th and 8th grade, her friends would notice that she was zoning out. She recalls that she would feel like her heart dropped into her stomach and she would see white. She knew it was happening in the moment and couldn't talk or do anything about it. She would lose awareness. Her mother noted that one pupil would get big and one small. She would just stop motionless. No eye fluttering, lip smacking, or automatisms. She has never had a convulsion. She notices lack of sleep and stress as triggers. Strobe lights bother her.      When she was diagnosed, she was started on lamictal and ethosuximide at the same time. She has never been on any other medications. She was followed by Dr. Mcguire and then Dr. Elizabeth. EEG in the [facility] system in 2014 showed "generalized burst of epileptic activity".      Her last seizure was about 6 years ago. She would have a numbing sensation travel up her head and she would feel disoriented in 2019. She states the numbness would come from both ears and then travel up the back of her head. This would last about 10-15 seconds. She felt a little dizzy briefly one time. Dr. Elizabeth felt this was a migraine. They last one of these that she had was about 1 month ago. Before that, it was in March.      She is on lamictal 200 mg BID. Her last lamictal level was years ago. She was told it was low. She was previously on ethosuximide as well but was weaned off that in November 2018. She notes her last EEG she still had "signs of seizures" so they stayed on the lamictal. This was in December 2018. She has never had a MRI brain.       Her maternal cousin has epilepsy--he has convulsions. She notes her parents told her she was a sick baby and she cried a lot. She had big " tonsils and adenoids and they were told she did not get adequate oxygen to the brain. She had a minor concussion with softball hitting her head in high school. Her mother also said as a baby she stared so wonders if she was having seizures as a baby too.     Allergies:  Review of patient's allergies indicates:   Allergen Reactions    Benadryl [diphenhydramine hcl]      Lowers seizure threshold    Quinolones      Lowers seizure threshold    Tramadol      Lowers seizure threshold    Wellbutrin [bupropion hcl]      Lowers seizure threshold    Chocolate Rash       Current Medications:  Current Outpatient Medications   Medication Sig Dispense Refill    albuterol (ACCUNEB) 0.63 mg/3 mL Nebu USE 1 VIAL IN NEBULIZER EVERY 4 TO 6 HOURS AS NEEDED FOR SHORTNESS OF BREATH FOR WHEEZING      ALPRAZolam (XANAX) 0.25 MG tablet Take 1 tablet (0.25 mg total) by mouth 2 (two) times daily as needed for Anxiety. 14 tablet 0    azelastine (ASTELIN) 137 mcg (0.1 %) nasal spray 2 sprays 2 (two) times daily.      betamethasone valerate 0.1% (VALISONE) 0.1 % Lotn Aaa qday 60 mL 4    clindamycin (CLEOCIN T) 1 % external solution Aaa qd posterior scalp (Patient not taking: Reported on 12/30/2024) 60 mL 6    doxycycline (VIBRAMYCIN) 100 MG Cap Take 100 mg by mouth 2 (two) times daily.      EPINEPHrine (EPIPEN) 0.3 mg/0.3 mL AtIn Inject 0.6 mLs (0.6 mg total) into the muscle as needed. 2 each 0    ergocalciferol (ERGOCALCIFEROL) 50,000 unit Cap Take 1 capsule (50,000 Units total) by mouth every 7 days. 12 capsule 0    famotidine (PEPCID) 20 MG tablet Take 1 tablet (20 mg total) by mouth 2 (two) times daily. (Patient taking differently: Take 20 mg by mouth nightly as needed.) 20 tablet 0    lamoTRIgine (LAMICTAL) 100 MG tablet Take 3.5 tablets (350 mg total) by mouth 2 (two) times daily. 210 tablet 11    multivit-min/ferrous fumarate (MULTI VITAMIN ORAL) Take 1 Dose by mouth once daily.      ondansetron (ZOFRAN-ODT) 4 MG TbDL Take 1 tablet (4 mg  total) by mouth 2 (two) times daily as needed (nausea). 20 tablet 0     Current Facility-Administered Medications   Medication Dose Route Frequency Provider Last Rate Last Admin    triamcinolone acetonide injection 10 mg  10 mg Intradermal 1 time in Clinic/HOD            Past Medical History:  Past Medical History:   Diagnosis Date    Focal seizures     Folliculitis     History of polycystic ovarian disease     Seizures     2013    Speech delay        Past Surgical History:  Past Surgical History:   Procedure Laterality Date    BUNIONECTOMY Right 6/1/2018    Procedure: BUNIONECTOMY;  Surgeon: Lm Obrien DPM;  Location: [facility];  Service: Podiatry;  Laterality: Right;    CORRECTION OF HAMMER TOE Right 6/1/2018    Procedure: CORRECTION, HAMMER TOE;  Surgeon: Lm Obrien DPM;  Location: [facility] OR;  Service: Podiatry;  Laterality: Right;    FOOT ARTHROPLASTY Right 6/1/2018    Procedure: orif subtalar joint with Hyprocure implant ;  Surgeon: Lm Obrien DPM;  Location: [facility];  Service: Podiatry;  Laterality: Right;    FOOT SURGERY      TONSILLECTOMY, ADENOIDECTOMY, BILATERAL MYRINGOTOMY AND TUBES      WISDOM TOOTH EXTRACTION         Family History:  family history includes Anemia in her sister; Anxiety disorder in her sister; Arthritis in her mother; Asthma in her sister; Bone cancer in her paternal grandmother; Breast cancer in her paternal grandmother; Cholelithiasis in her mother; Diabetes in her paternal grandfather; Diabetes Mellitus in her father; Endometriosis in her sister; Heart disease in her maternal grandfather; Heart disease (age of onset: 35) in her mother; Hypertension in her father, maternal grandmother, and mother; Irritable bowel syndrome in her maternal aunt; Myasthenia gravis in her paternal grandfather and paternal grandmother; Ovarian cancer in an other family member; Ulcerative colitis in her maternal uncle.    Social History:   reports that she has never smoked. She has  never used smokeless tobacco. She reports current alcohol use. She reports that she does not use drugs.    Physical Exam:      Neurological Exam:  Mental status: Awake and alert  Speech language: No dysarthria or aphasia on conversation  Cranial nerves: Face symmetric      Data:  I have personally reviewed other provider's notes, labs, & imaging made available to me today.     Labs:  CBC:   Lab Results   Component Value Date    WBC 7.63 09/30/2024    HGB 13.3 09/30/2024    HCT 41.1 09/30/2024     09/30/2024    MCV 95 09/30/2024    RDW 13.2 09/30/2024     BMP:   Lab Results   Component Value Date     09/30/2024    K 3.9 09/30/2024     09/30/2024    CO2 23 09/30/2024    BUN 11 09/30/2024    CREATININE 0.9 09/30/2024    GLU 81 09/30/2024    CALCIUM 9.3 09/30/2024    MG 1.9 06/01/2021     LFTS;   Lab Results   Component Value Date    PROT 7.2 09/30/2024    ALBUMIN 4.2 09/30/2024    BILITOT 0.4 09/30/2024    AST 22 09/30/2024    ALKPHOS 51 (L) 09/30/2024    ALT 27 09/30/2024     COAGS:   Lab Results   Component Value Date    INR 1.0 08/12/2019     FLP:   Lab Results   Component Value Date    CHOL 229 (H) 09/30/2024    HDL 44 09/30/2024    LDLCALC 158.2 09/30/2024    TRIG 134 09/30/2024    CHOLHDL 19.2 (L) 09/30/2024       Imaging:  I have personally reviewed the imaging, MRI brain is normal.     Assessment and Plan:  Ms. Hamilton is a 29 y.o. female here for followup of epilepsy. No seizures. She continues on lamictal 350 mg BID. Will continue this. Will check trough level for monitoring purposes.       Copper IUD, mirena, or slynd would be preferable with lamictal. Estrogens can lower the lamictal level in the blood stream so we try to avoid that if possible. If needed, we could need to monitor lamictal levels and adjust dose.     Agree that dizziness in fall 2024 could have been inner ear related per description. Also, part of the description of tingling could be occipital neuralgia.     Weight noted  in chart. Work with PCP on weight loss.     Nonintractable epilepsy without status epilepticus, unspecified epilepsy type  -     Lamotrigine level; Future; Expected date: 01/21/2025    Class 3 severe obesity with serious comorbidity and body mass index (BMI) of 50.0 to 59.9 in adult, unspecified obesity type    Other orders  -     lamoTRIgine (LAMICTAL) 100 MG tablet; Take 3.5 tablets (350 mg total) by mouth 2 (two) times daily.  Dispense: 210 tablet; Refill: 11         Visit today is associated with current or anticipated ongoing medical care related to this patient's single serious condition/complex condition (epilepsy). Plan to followup in 12 months for ongoing management.

## 2025-02-10 ENCOUNTER — LAB VISIT (OUTPATIENT)
Dept: LAB | Facility: HOSPITAL | Age: 30
End: 2025-02-10
Attending: PSYCHIATRY & NEUROLOGY
Payer: COMMERCIAL

## 2025-02-10 DIAGNOSIS — G40.909 NONINTRACTABLE EPILEPSY WITHOUT STATUS EPILEPTICUS, UNSPECIFIED EPILEPSY TYPE: ICD-10-CM

## 2025-02-10 PROCEDURE — 36415 COLL VENOUS BLD VENIPUNCTURE: CPT | Mod: PN | Performed by: PSYCHIATRY & NEUROLOGY

## 2025-02-10 PROCEDURE — 80175 DRUG SCREEN QUAN LAMOTRIGINE: CPT | Performed by: PSYCHIATRY & NEUROLOGY

## 2025-02-13 LAB — LAMOTRIGINE SERPL-MCNC: 9.9 UG/ML (ref 2–15)

## 2025-02-18 ENCOUNTER — OFFICE VISIT (OUTPATIENT)
Facility: CLINIC | Age: 30
End: 2025-02-18
Payer: COMMERCIAL

## 2025-02-18 DIAGNOSIS — L73.0 ACNE KELOIDALIS NUCHAE: ICD-10-CM

## 2025-02-18 DIAGNOSIS — L70.0 CYSTIC ACNE: Primary | ICD-10-CM

## 2025-02-18 DIAGNOSIS — L73.2 HIDRADENITIS SUPPURATIVA: ICD-10-CM

## 2025-02-18 RX ORDER — CLINDAMYCIN PHOSPHATE 10 MG/G
GEL TOPICAL
Qty: 60 G | Refills: 3 | Status: SHIPPED | OUTPATIENT
Start: 2025-02-18

## 2025-02-18 RX ORDER — DOXYCYCLINE HYCLATE 100 MG
TABLET ORAL
Qty: 60 TABLET | Refills: 2 | Status: SHIPPED | OUTPATIENT
Start: 2025-02-18

## 2025-02-18 NOTE — PROGRESS NOTES
Subjective:      Patient ID:  Terra Hamilton is a 29 y.o. female who presents for   Chief Complaint   Patient presents with    Follow-up     HPI  HPI  Patient present for scalp follow up.   Pt states scalp issues are improving. Some bigger bumps.  Also with HS, rx doxy after last visit when no improvement with topicals and ILK 10 to left breast cystic nodule. Patient would like the left breast to be rechecked because it had popped and pus came out.  Washing chest with bp wash  Tx with aldactone previously, dizzy spells  Finds skin is better in winter   Pneumonia dx 12/24, rx doxy           Review of Systems   Constitutional:  Negative for fever and chills.   Skin:  Negative for itching and rash.          Objective:   Physical Exam   HENT:   Head:            Diagram Legend     Erythematous scaling macule/papule c/w actinic keratosis       Vascular papule c/w angioma      Pigmented verrucoid papule/plaque c/w seborrheic keratosis      Yellow umbilicated papule c/w sebaceous hyperplasia      Irregularly shaped tan macule c/w lentigo     1-2 mm smooth white papules consistent with Milia      Movable subcutaneous cyst with punctum c/w epidermal inclusion cyst      Subcutaneous movable cyst c/w pilar cyst      Firm pink to brown papule c/w dermatofibroma      Pedunculated fleshy papule(s) c/w skin tag(s)      Evenly pigmented macule c/w junctional nevus     Mildly variegated pigmented, slightly irregular-bordered macule c/w mildly atypical nevus      Flesh colored to evenly pigmented papule c/w intradermal nevus       Pink pearly papule/plaque c/w basal cell carcinoma      Erythematous hyperkeratotic cursted plaque c/w SCC      Surgical scar with no sign of skin cancer recurrence      Open and closed comedones      Inflammatory papules and pustules      Verrucoid papule consistent consistent with wart     Erythematous eczematous patches and plaques     Dystrophic onycholytic nail with subungual debris c/w  onychomycosis     Umbilicated papule    Erythematous-base heme-crusted tan verrucoid plaque consistent with inflamed seborrheic keratosis     Erythematous Silvery Scaling Plaque c/w Psoriasis     See annotation      Assessment / Plan:        Cystic acne  -     clindamycin phosphate 1% 1 % gel; Thin film to face qam and bid prn spot treatment  Dispense: 60 g; Refill: 3  -     clascoterone 1 % Crea; Aaa bid  Dispense: 60 g; Refill: 3  -     doxycycline (VIBRA-TABS) 100 MG tablet; 1 po bid with food, do not lay down for at least 1 hour after ingestion  Dispense: 60 tablet; Refill: 2    Acne keloidalis nuchae  -     clascoterone 1 % Crea; Aaa bid  Dispense: 60 g; Refill: 3  -     doxycycline (VIBRA-TABS) 100 MG tablet; 1 po bid with food, do not lay down for at least 1 hour after ingestion  Dispense: 60 tablet; Refill: 2  -     triamcinolone acetonide injection 10 mg  Intralesional Kenalog 10mg/cc (0.9 cc total) injected into 7 lesions on the posterior neck today after obtaining verbal consent including risk of surrounding hypopigmentation. Patient tolerated procedure well.    Units: 1  NDC for Kenalog 10mg/cc:  9289-4011-47    Hidradenitis suppurativa  Doxy bid prn flare  Recommend oral zinc supplementation          No follow-ups on file.

## 2025-02-20 ENCOUNTER — PATIENT MESSAGE (OUTPATIENT)
Facility: CLINIC | Age: 30
End: 2025-02-20
Payer: COMMERCIAL

## 2025-03-16 ENCOUNTER — PATIENT MESSAGE (OUTPATIENT)
Facility: CLINIC | Age: 30
End: 2025-03-16
Payer: COMMERCIAL

## 2025-04-04 ENCOUNTER — PATIENT MESSAGE (OUTPATIENT)
Facility: CLINIC | Age: 30
End: 2025-04-04
Payer: COMMERCIAL

## 2025-04-23 ENCOUNTER — OFFICE VISIT (OUTPATIENT)
Dept: OBSTETRICS AND GYNECOLOGY | Facility: CLINIC | Age: 30
End: 2025-04-23
Attending: OBSTETRICS & GYNECOLOGY
Payer: COMMERCIAL

## 2025-04-23 ENCOUNTER — TELEPHONE (OUTPATIENT)
Dept: OBSTETRICS AND GYNECOLOGY | Facility: CLINIC | Age: 30
End: 2025-04-23

## 2025-04-23 ENCOUNTER — LAB VISIT (OUTPATIENT)
Dept: LAB | Facility: OTHER | Age: 30
End: 2025-04-23
Attending: OBSTETRICS & GYNECOLOGY
Payer: COMMERCIAL

## 2025-04-23 VITALS
BODY MASS INDEX: 46.24 KG/M2 | SYSTOLIC BLOOD PRESSURE: 108 MMHG | WEIGHT: 244.69 LBS | DIASTOLIC BLOOD PRESSURE: 72 MMHG

## 2025-04-23 DIAGNOSIS — Z11.3 SCREENING EXAMINATION FOR STD (SEXUALLY TRANSMITTED DISEASE): ICD-10-CM

## 2025-04-23 DIAGNOSIS — Z01.419 ENCOUNTER FOR GYNECOLOGICAL EXAMINATION: Primary | ICD-10-CM

## 2025-04-23 DIAGNOSIS — Z12.4 ENCOUNTER FOR PAPANICOLAOU SMEAR FOR CERVICAL CANCER SCREENING: ICD-10-CM

## 2025-04-23 DIAGNOSIS — Z30.430 ENCOUNTER FOR INSERTION OF MIRENA IUD: Primary | ICD-10-CM

## 2025-04-23 LAB
HBV SURFACE AG SERPL QL IA: NORMAL
HIV 1+2 AB+HIV1 P24 AG SERPL QL IA: NEGATIVE
T PALLIDUM IGG+IGM SER QL: NEGATIVE

## 2025-04-23 PROCEDURE — 87591 N.GONORRHOEAE DNA AMP PROB: CPT | Performed by: OBSTETRICS & GYNECOLOGY

## 2025-04-23 PROCEDURE — 99395 PREV VISIT EST AGE 18-39: CPT | Mod: S$GLB,,, | Performed by: OBSTETRICS & GYNECOLOGY

## 2025-04-23 PROCEDURE — 87389 HIV-1 AG W/HIV-1&-2 AB AG IA: CPT

## 2025-04-23 PROCEDURE — 86593 SYPHILIS TEST NON-TREP QUANT: CPT

## 2025-04-23 PROCEDURE — 3078F DIAST BP <80 MM HG: CPT | Mod: CPTII,S$GLB,, | Performed by: OBSTETRICS & GYNECOLOGY

## 2025-04-23 PROCEDURE — 1159F MED LIST DOCD IN RCRD: CPT | Mod: CPTII,S$GLB,, | Performed by: OBSTETRICS & GYNECOLOGY

## 2025-04-23 PROCEDURE — 99459 PELVIC EXAMINATION: CPT | Mod: ,,, | Performed by: OBSTETRICS & GYNECOLOGY

## 2025-04-23 PROCEDURE — 36415 COLL VENOUS BLD VENIPUNCTURE: CPT

## 2025-04-23 PROCEDURE — 3074F SYST BP LT 130 MM HG: CPT | Mod: CPTII,S$GLB,, | Performed by: OBSTETRICS & GYNECOLOGY

## 2025-04-23 PROCEDURE — 99999 PR PBB SHADOW E&M-EST. PATIENT-LVL III: CPT | Mod: PBBFAC,,, | Performed by: OBSTETRICS & GYNECOLOGY

## 2025-04-23 PROCEDURE — 3008F BODY MASS INDEX DOCD: CPT | Mod: CPTII,S$GLB,, | Performed by: OBSTETRICS & GYNECOLOGY

## 2025-04-23 PROCEDURE — 87340 HEPATITIS B SURFACE AG IA: CPT

## 2025-04-23 NOTE — TELEPHONE ENCOUNTER
----- Message from Tawanna Woodward MD sent at 4/23/2025  1:18 PM CDT -----  Please order Mirena and plan for u/s guidance for insertion. Patient will expect phone call when approved by insurance. Thanks.

## 2025-04-23 NOTE — Clinical Note
Please order Mirena and plan for u/s guidance for insertion. Patient will expect phone call when approved by insurance. Thanks.

## 2025-04-23 NOTE — PROGRESS NOTES
Subjective:       Patient ID: Terra Hamilton is a 29 y.o. female.    Chief Complaint:  Well Woman (Last pap: 03/18/2024 normal )      History of Present Illness  - here for annual. Has history of epilepsy; is closely managed by Neuro.  - has periods every 2 1/2 weeks. Tried progestin-only ocps which caused periods q 1 1/2 weeks.  - tried the patch but it didn't stay on.  - reports has h/o PCOS. In the past, she wouldn't get her period for months.  - has decided not to have children.     Past Medical History:   Diagnosis Date    Focal seizures     Folliculitis     History of polycystic ovarian disease     Seizures     2013    Speech delay        Past Surgical History:   Procedure Laterality Date    BUNIONECTOMY Right 6/1/2018    Procedure: BUNIONECTOMY;  Surgeon: Lm Obrien DPM;  Location: [facility];  Service: Podiatry;  Laterality: Right;    CORRECTION OF HAMMER TOE Right 6/1/2018    Procedure: CORRECTION, HAMMER TOE;  Surgeon: Lm Obrien DPM;  Location: [facility] OR;  Service: Podiatry;  Laterality: Right;    FOOT ARTHROPLASTY Right 6/1/2018    Procedure: orif subtalar joint with Hyprocure implant ;  Surgeon: Lm Obrien DPM;  Location: [facility];  Service: Podiatry;  Laterality: Right;    FOOT SURGERY      TONSILLECTOMY, ADENOIDECTOMY, BILATERAL MYRINGOTOMY AND TUBES      WISDOM TOOTH EXTRACTION          Family History   Problem Relation Name Age of Onset    Hypertension Mother      Heart disease Mother  35        valve repair    Arthritis Mother      Cholelithiasis Mother      Hypertension Father      Diabetes Mellitus Father      Asthma Sister      Endometriosis Sister      Anemia Sister      Anxiety disorder Sister      Irritable bowel syndrome Maternal Aunt      Ulcerative colitis Maternal Uncle      Hypertension Maternal Grandmother      Heart disease Maternal Grandfather      Breast cancer Paternal Grandmother      Myasthenia gravis Paternal Grandmother      Bone cancer Paternal  Grandmother      Myasthenia gravis Paternal Grandfather      Diabetes Paternal Grandfather      Ovarian cancer Other paternal gr aunt         Social History[1]        Objective:     Vitals:    04/23/25 1301   BP: 108/72   Weight: 111 kg (244 lb 11.4 oz)       Physical Exam:   Constitutional: She is oriented to person, place, and time. She appears well-developed and well-nourished.        Pulmonary/Chest: Right breast exhibits no mass, no nipple discharge, no skin change, no tenderness and no swelling. Left breast exhibits no mass, no nipple discharge, no skin change, no tenderness and no swelling. Breasts are symmetrical.        Abdominal: Soft. She exhibits no distension. There is no abdominal tenderness.     Genitourinary:    Vagina and uterus normal.   There is no tenderness or lesion on the right labia. There is no tenderness or lesion on the left labia. Cervix is normal. Right adnexum displays no mass, no tenderness and no fullness. Left adnexum displays no mass, no tenderness and no fullness. No vaginal discharge in the vagina.    pap smear completed   Genitourinary Comments: Difficult exam due to body habitus             Musculoskeletal: Moves all extremeties.       Neurological: She is alert and oriented to person, place, and time.     Psychiatric: She has a normal mood and affect.        A female chaperone was present for exam.    Assessment/ Plan:     Orders Placed This Encounter    HIV 1/2 Ag/Ab (4th Gen)    Treponema Pallidium Antibodies IgG, IgM    Hepatitis B Surface Antigen    C. trachomatis/N. gonorrhoeae by AMP DNA    Liquid-Based Pap Smear, Screening       Terra was seen today for well woman.    Diagnoses and all orders for this visit:    Encounter for gynecological examination    Screening examination for STD (sexually transmitted disease)  -     HIV 1/2 Ag/Ab (4th Gen); Future  -     Treponema Pallidium Antibodies IgG, IgM; Future  -     Hepatitis B Surface Antigen; Future  -     C.  trachomatis/N. gonorrhoeae by AMP DNA    Encounter for Papanicolaou smear for cervical cancer screening  -     Liquid-Based Pap Smear, Screening    - discussed risks/benefits of Mirena IUD inserted under ultrasound guidance. This should greatly help her period situation (no guarantee, but likely). Whereas, tying/removing her tubes wouldn't help the hormonal issue.   - patient verbalized understanding and agrees with plan.      Follow up in about 1 month (around 5/23/2025) for Mirena IUD insertion under ultrasound guidance.    As of April 1, 2021, the Cures Act has been passed nationally. This new law requires that all doctors progress notes, lab results, pathology reports and radiology reports be released IMMEDIATELY to the patient in the patient portal. That means that the results are released to you at the EXACT same time they are released to me. Therefore, with all of the patients that I have I am not able to reply to each patient exactly when the results come in. So there will be a delay from when you see the results to when I see them and have time to come up with a response to send you. Also I only see these results when I am on the computer at work. So if the results come in over the weekend or after 5 pm of a work day, I will not see them until the next business day. As you can tell, this is a challenge as a physician to give every patient the quick response they hope for and deserve. So please be patient!   Thanks for your understanding and patience.         [1]   Social History  Socioeconomic History    Marital status: Single   Occupational History    Occupation: Self employed     Comment:    Tobacco Use    Smoking status: Never    Smokeless tobacco: Never   Substance and Sexual Activity    Alcohol use: Yes     Comment: only on special occasions    Drug use: No    Sexual activity: Yes     Partners: Male     Birth control/protection: Condom   Social History Narrative    Lives with Mom, Dad. 2  dogs.     Social Drivers of Health     Financial Resource Strain: Low Risk  (1/21/2025)    Overall Financial Resource Strain (CARDIA)     Difficulty of Paying Living Expenses: Not very hard   Food Insecurity: No Food Insecurity (1/21/2025)    Hunger Vital Sign     Worried About Running Out of Food in the Last Year: Never true     Ran Out of Food in the Last Year: Never true   Transportation Needs: No Transportation Needs (12/26/2023)    PRAPARE - Transportation     Lack of Transportation (Medical): No     Lack of Transportation (Non-Medical): No   Physical Activity: Sufficiently Active (1/21/2025)    Exercise Vital Sign     Days of Exercise per Week: 3 days     Minutes of Exercise per Session: 60 min   Stress: No Stress Concern Present (1/21/2025)    [employer] - Occupational Stress Questionnaire     Feeling of Stress : Not at all   Housing Stability: Low Risk  (12/26/2023)    Housing Stability Vital Sign     Unable to Pay for Housing in the Last Year: No     Number of Places Lived in the Last Year: 1     Unstable Housing in the Last Year: No

## 2025-04-25 LAB
C TRACH DNA SPEC QL NAA+PROBE: NOT DETECTED
CTGC SOURCE (OHS) ORD-325: NORMAL
N GONORRHOEA DNA UR QL NAA+PROBE: NOT DETECTED

## 2025-04-28 ENCOUNTER — RESULTS FOLLOW-UP (OUTPATIENT)
Dept: OBSTETRICS AND GYNECOLOGY | Facility: CLINIC | Age: 30
End: 2025-04-28

## 2025-05-20 ENCOUNTER — PATIENT MESSAGE (OUTPATIENT)
Dept: OBSTETRICS AND GYNECOLOGY | Facility: CLINIC | Age: 30
End: 2025-05-20
Payer: COMMERCIAL

## 2025-06-03 ENCOUNTER — PROCEDURE VISIT (OUTPATIENT)
Dept: OBSTETRICS AND GYNECOLOGY | Facility: CLINIC | Age: 30
End: 2025-06-03
Attending: OBSTETRICS & GYNECOLOGY
Payer: COMMERCIAL

## 2025-06-03 VITALS — HEIGHT: 61 IN | BODY MASS INDEX: 46.24 KG/M2

## 2025-06-03 DIAGNOSIS — Z30.430 ENCOUNTER FOR INSERTION OF MIRENA IUD: Primary | ICD-10-CM

## 2025-06-03 DIAGNOSIS — Z01.812 PRE-PROCEDURE LAB EXAM: ICD-10-CM

## 2025-06-03 LAB
B-HCG UR QL: NEGATIVE
CTP QC/QA: YES

## 2025-06-03 PROCEDURE — 76857 US EXAM PELVIC LIMITED: CPT | Mod: S$GLB,,, | Performed by: OBSTETRICS & GYNECOLOGY

## 2025-06-03 PROCEDURE — 58300 INSERT INTRAUTERINE DEVICE: CPT | Mod: S$GLB,,, | Performed by: OBSTETRICS & GYNECOLOGY

## 2025-06-03 PROCEDURE — 81025 URINE PREGNANCY TEST: CPT | Mod: S$GLB,,, | Performed by: OBSTETRICS & GYNECOLOGY

## 2025-06-17 ENCOUNTER — OFFICE VISIT (OUTPATIENT)
Facility: CLINIC | Age: 30
End: 2025-06-17
Payer: COMMERCIAL

## 2025-06-17 DIAGNOSIS — L73.0 ACNE KELOIDALIS NUCHAE: ICD-10-CM

## 2025-06-17 DIAGNOSIS — L70.0 ACNE VULGARIS: ICD-10-CM

## 2025-06-17 DIAGNOSIS — L73.2 HIDRADENITIS SUPPURATIVA: Primary | ICD-10-CM

## 2025-06-17 PROCEDURE — 99999 PR PBB SHADOW E&M-EST. PATIENT-LVL III: CPT | Mod: PBBFAC,,, | Performed by: DERMATOLOGY

## 2025-06-17 PROCEDURE — 99214 OFFICE O/P EST MOD 30 MIN: CPT | Mod: S$GLB,,, | Performed by: DERMATOLOGY

## 2025-06-17 PROCEDURE — 1160F RVW MEDS BY RX/DR IN RCRD: CPT | Mod: CPTII,S$GLB,, | Performed by: DERMATOLOGY

## 2025-06-17 PROCEDURE — 1159F MED LIST DOCD IN RCRD: CPT | Mod: CPTII,S$GLB,, | Performed by: DERMATOLOGY

## 2025-06-17 RX ORDER — TRETINOIN 0.25 MG/G
CREAM TOPICAL
Qty: 45 G | Refills: 3 | Status: SHIPPED | OUTPATIENT
Start: 2025-06-17

## 2025-06-17 RX ORDER — DAPSONE 50 MG/G
GEL TOPICAL
Qty: 60 G | Refills: 3 | Status: SHIPPED | OUTPATIENT
Start: 2025-06-17

## 2025-06-17 RX ORDER — CLOBETASOL PROPIONATE 0.5 MG/G
AEROSOL, FOAM TOPICAL
Qty: 50 G | Refills: 3 | Status: SHIPPED | OUTPATIENT
Start: 2025-06-17

## 2025-06-17 NOTE — PROGRESS NOTES
Subjective:      Patient ID:  Terra Hamilton is a 29 y.o. female who presents for   Chief Complaint   Patient presents with    Acne    Cyst    Folliculitis     HPI  HPI  Patient present for scalp follow up.   Pt states scalp issues are improving. Some bigger bumps.  Also with HS, rx doxy after last visit when no improvement with topicals and ILK 10 to left breast cystic nodule. Patient would like the left breast to be rechecked because it had popped and pus came out.  Washing chest with bp wash  Tx with aldactone previously, dizzy spells  Finds skin is better in winter   Pneumonia dx 12/24, rx doxy            Review of Systems   Constitutional:  Negative for fever and chills.   Skin:  Negative for itching and rash.      Objective:   Physical Exam   Constitutional: She appears well-developed and well-nourished. No distress.   HENT:   Head:       Neurological: She is alert and oriented to person, place, and time. She is not disoriented.   Psychiatric: She has a normal mood and affect.   Skin:   Areas Examined (abnormalities noted in diagram):   Scalp / Hair Palpated and Inspected  Head / Face Inspection Performed  Neck Inspection Performed  Chest / Axilla Inspection Performed                 Diagram Legend     Erythematous scaling macule/papule c/w actinic keratosis       Vascular papule c/w angioma      Pigmented verrucoid papule/plaque c/w seborrheic keratosis      Yellow umbilicated papule c/w sebaceous hyperplasia      Irregularly shaped tan macule c/w lentigo     1-2 mm smooth white papules consistent with Milia      Movable subcutaneous cyst with punctum c/w epidermal inclusion cyst      Subcutaneous movable cyst c/w pilar cyst      Firm pink to brown papule c/w dermatofibroma      Pedunculated fleshy papule(s) c/w skin tag(s)      Evenly pigmented macule c/w junctional nevus     Mildly variegated pigmented, slightly irregular-bordered macule c/w mildly atypical nevus      Flesh colored to evenly  pigmented papule c/w intradermal nevus       Pink pearly papule/plaque c/w basal cell carcinoma      Erythematous hyperkeratotic cursted plaque c/w SCC      Surgical scar with no sign of skin cancer recurrence      Open and closed comedones      Inflammatory papules and pustules      Verrucoid papule consistent consistent with wart     Erythematous eczematous patches and plaques     Dystrophic onycholytic nail with subungual debris c/w onychomycosis     Umbilicated papule    Erythematous-base heme-crusted tan verrucoid plaque consistent with inflamed seborrheic keratosis     Erythematous Silvery Scaling Plaque c/w Psoriasis     See annotation      Assessment / Plan:        Hidradenitis suppurativa  Continue doxy prn flare  BP wash  Dietary modification - low inflammatory diet, consider avoidance of gluten, dairy, sugar    Acne vulgaris  -     tretinoin (RETIN-A) 0.025 % cream; Apply small amount to face nightly  Dispense: 45 g; Refill: 3  -     dapsone (ACZONE) 5 % topical gel; Aaa qam  Dispense: 60 g; Refill: 3    Acne keloidalis nuchae  -     clobetasoL (OLUX) 0.05 % Foam; Aaa scalp bid x 2 weeks then prn  Dispense: 50 g; Refill: 3             No follow-ups on file.

## 2025-07-29 ENCOUNTER — PATIENT MESSAGE (OUTPATIENT)
Dept: NEUROLOGY | Facility: CLINIC | Age: 30
End: 2025-07-29
Payer: COMMERCIAL

## 2025-07-29 DIAGNOSIS — G40.909 NONINTRACTABLE EPILEPSY WITHOUT STATUS EPILEPTICUS, UNSPECIFIED EPILEPSY TYPE: Primary | ICD-10-CM

## 2025-08-01 ENCOUNTER — LAB VISIT (OUTPATIENT)
Dept: LAB | Facility: HOSPITAL | Age: 30
End: 2025-08-01
Payer: COMMERCIAL

## 2025-08-01 DIAGNOSIS — G40.909 NONINTRACTABLE EPILEPSY WITHOUT STATUS EPILEPTICUS, UNSPECIFIED EPILEPSY TYPE: ICD-10-CM

## 2025-08-01 LAB
ABSOLUTE EOSINOPHIL (OHS): 0.04 K/UL
ABSOLUTE MONOCYTE (OHS): 0.44 K/UL (ref 0.3–1)
ABSOLUTE NEUTROPHIL COUNT (OHS): 4.95 K/UL (ref 1.8–7.7)
ALBUMIN SERPL BCP-MCNC: 4.3 G/DL (ref 3.5–5.2)
ALP SERPL-CCNC: 54 UNIT/L (ref 40–150)
ALT SERPL W/O P-5'-P-CCNC: 17 UNIT/L (ref 0–55)
ANION GAP (OHS): 8 MMOL/L (ref 8–16)
AST SERPL-CCNC: 21 UNIT/L (ref 0–50)
BASOPHILS # BLD AUTO: 0.06 K/UL
BASOPHILS NFR BLD AUTO: 0.8 %
BILIRUB SERPL-MCNC: 0.6 MG/DL (ref 0.1–1)
BUN SERPL-MCNC: 11 MG/DL (ref 6–20)
CALCIUM SERPL-MCNC: 9.3 MG/DL (ref 8.7–10.5)
CHLORIDE SERPL-SCNC: 106 MMOL/L (ref 95–110)
CO2 SERPL-SCNC: 25 MMOL/L (ref 23–29)
CREAT SERPL-MCNC: 1 MG/DL (ref 0.5–1.4)
ERYTHROCYTE [DISTWIDTH] IN BLOOD BY AUTOMATED COUNT: 12.9 % (ref 11.5–14.5)
GFR SERPLBLD CREATININE-BSD FMLA CKD-EPI: >60 ML/MIN/1.73/M2
GLUCOSE SERPL-MCNC: 74 MG/DL (ref 70–110)
HCT VFR BLD AUTO: 40.6 % (ref 37–48.5)
HGB BLD-MCNC: 12.9 GM/DL (ref 12–16)
IMM GRANULOCYTES # BLD AUTO: 0.02 K/UL (ref 0–0.04)
IMM GRANULOCYTES NFR BLD AUTO: 0.3 % (ref 0–0.5)
LYMPHOCYTES # BLD AUTO: 2.17 K/UL (ref 1–4.8)
MCH RBC QN AUTO: 30.6 PG (ref 27–31)
MCHC RBC AUTO-ENTMCNC: 31.8 G/DL (ref 32–36)
MCV RBC AUTO: 96 FL (ref 82–98)
NUCLEATED RBC (/100WBC) (OHS): 0 /100 WBC
PLATELET # BLD AUTO: 332 K/UL (ref 150–450)
PMV BLD AUTO: 11.4 FL (ref 9.2–12.9)
POTASSIUM SERPL-SCNC: 4.2 MMOL/L (ref 3.5–5.1)
PROT SERPL-MCNC: 7.3 GM/DL (ref 6–8.4)
RBC # BLD AUTO: 4.22 M/UL (ref 4–5.4)
RELATIVE EOSINOPHIL (OHS): 0.5 %
RELATIVE LYMPHOCYTE (OHS): 28.3 % (ref 18–48)
RELATIVE MONOCYTE (OHS): 5.7 % (ref 4–15)
RELATIVE NEUTROPHIL (OHS): 64.4 % (ref 38–73)
SODIUM SERPL-SCNC: 139 MMOL/L (ref 136–145)
WBC # BLD AUTO: 7.68 K/UL (ref 3.9–12.7)

## 2025-08-01 PROCEDURE — 36415 COLL VENOUS BLD VENIPUNCTURE: CPT | Mod: PN

## 2025-08-01 PROCEDURE — 80053 COMPREHEN METABOLIC PANEL: CPT

## 2025-08-01 PROCEDURE — 85025 COMPLETE CBC W/AUTO DIFF WBC: CPT

## 2025-08-01 PROCEDURE — 80175 DRUG SCREEN QUAN LAMOTRIGINE: CPT

## 2025-08-04 LAB — W LAMOTRIGINE: 12.2 UG/ML

## 2025-08-24 ENCOUNTER — PATIENT MESSAGE (OUTPATIENT)
Facility: CLINIC | Age: 30
End: 2025-08-24
Payer: COMMERCIAL

## (undated) DEVICE — GUIDEWIRE .86MM TROCAR TIP
Type: IMPLANTABLE DEVICE | Site: FOOT | Status: NON-FUNCTIONAL
Removed: 2017-07-26

## (undated) DEVICE — ELECTRODE REM PLYHSV RETURN 9

## (undated) DEVICE — GLOVE SURGICAL LATEX SZ 8

## (undated) DEVICE — BLADE MEDIUM 9MM X 25MM

## (undated) DEVICE — Device

## (undated) DEVICE — SEE MEDLINE ITEM 152530

## (undated) DEVICE — DRAPE STERI-DRAPE 1000 17X11IN

## (undated) DEVICE — SUT MONOCYRL 4-0 PS2 UND

## (undated) DEVICE — DRAPE C ARM 42 X 120 10/BX

## (undated) DEVICE — SEE MEDLINE ITEM 152622

## (undated) DEVICE — SEE L#120831

## (undated) DEVICE — BANDAGE ESMARK LATEX FREE 4INX

## (undated) DEVICE — DRAPE C-ARM FOR MOBILE XRAY

## (undated) DEVICE — SEE MEDLINE ITEM 152529

## (undated) DEVICE — NDL HYPO A BEVEL 22X1 1/2

## (undated) DEVICE — SPONGE DERMACEA GAUZE 4X4

## (undated) DEVICE — NDL SAFETY 25G X 1.5 ECLIPSE

## (undated) DEVICE — BLADE SURG #15 CARBON STEEL

## (undated) DEVICE — ADHESIVE MASTISOL VIAL 48/BX

## (undated) DEVICE — SUT ETHILON 4-0 PS2 18 BLK

## (undated) DEVICE — SEE MEDLINE ITEM 146308

## (undated) DEVICE — PROFILE DRILL MICRO CMP FT

## (undated) DEVICE — TOURNIQUET SB QC DP 18X4IN

## (undated) DEVICE — APPLICATOR CHLORAPREP ORN 26ML

## (undated) DEVICE — BLADE SAW 16.0MM X 7.0MM

## (undated) DEVICE — SYR 10CC LUER LOCK

## (undated) DEVICE — DRESSING XEROFORM FOIL PK 1X8

## (undated) DEVICE — SEE MEDLINE ITEM 157128

## (undated) DEVICE — DRAPE C-ARM 64X41 STERILE

## (undated) DEVICE — SUT 3-0 VICRYL / SH (J416)

## (undated) DEVICE — GAUZE SPONGE 4X4 12PLY

## (undated) DEVICE — SPONGE DERMACEA 4X4IN 12PLY

## (undated) DEVICE — PACK CUSTOM UNIV BASIN SLI

## (undated) DEVICE — PACK UPPER EXTREMITY BAPTIST

## (undated) DEVICE — SUT 2-0 VICRYL / SH (J417)

## (undated) DEVICE — LINER GLOVE POWDERFREE 8

## (undated) DEVICE — DRAPE EXTREMITY W/ABC NON-SLIP

## (undated) DEVICE — STOCKINET 4INX48

## (undated) DEVICE — NDL HYPO REG 25G X 1 1/2

## (undated) DEVICE — CLOSURE SKIN STERI STRIP 1/2X4

## (undated) DEVICE — PENCIL ROCKER SWITCH 10FT CORD

## (undated) DEVICE — DRESSING XEROFORM 1X8IN

## (undated) DEVICE — SUT VICRYL CTD 2-0 GI 27 SH

## (undated) DEVICE — SUT ETHILON 3-0 FS-1 30

## (undated) DEVICE — SUT ETHILON 4-0 BLK MONO

## (undated) DEVICE — SLEEVE SCD EXPRESS CALF MEDIUM

## (undated) DEVICE — SEE MEDLINE ITEM 157131

## (undated) DEVICE — BIT DRILL 2.0MM CMP FT CALIB